# Patient Record
Sex: FEMALE | Race: BLACK OR AFRICAN AMERICAN | NOT HISPANIC OR LATINO | Employment: UNEMPLOYED | URBAN - NONMETROPOLITAN AREA
[De-identification: names, ages, dates, MRNs, and addresses within clinical notes are randomized per-mention and may not be internally consistent; named-entity substitution may affect disease eponyms.]

---

## 2017-01-18 ENCOUNTER — TELEPHONE (OUTPATIENT)
Dept: GASTROENTEROLOGY | Facility: CLINIC | Age: 62
End: 2017-01-18

## 2017-01-19 NOTE — TELEPHONE ENCOUNTER
Please call patient and ask her if there have been any changes in her symptoms, medications, surgeries, etc. I will need this info for H&P. Confirm her current med list. Thank you.

## 2017-01-19 NOTE — TELEPHONE ENCOUNTER
Patient states no change in symptoms, still having cough and the feeling something is stuck in her throat. She takes meds as follows: Methotrexate 2.5mg 1qd, folic acid 1mg 1 qd, amlodipine 10mg 1qd, lisinopril 5mg 1qd, triamterene/hctz 37.5/25 1 qd, asa 81mg 1qd, naproxen 220mg 2 tab daily prn, omeprazole 20mg bid, ranitidine otc 75mg 1 qd

## 2017-01-19 NOTE — TELEPHONE ENCOUNTER
"Below is a copy of unfinished H&P in case we need to re-print. I have printed for Dr. Barnes to take [facility].    HISTORY AND PHYSICAL  Date: 2017  : 1955          Chief Complaint   Patient presents with   • Consult       dysphagia.         Karyna Stewart is a 61 y.o. female who presents to the surgery center today for EGD and colonoscopy.     History of Present Illness:  She denies any changes since her last office visit in 2016.    She has been seeing a gastroenterologist at , Dr. Tavares. Dysphagia has progressed to the feeling of \"a fist in her throat.\" She has not been eating regular food, only eats very soft foods and drinks most of her nutrition. She admits coughing and keeping a cough drop or moisture in her mouth. Also, she sees Dr. Rosas for lupus, scleroderma and fibromyalgia. She is taking Methotrexate 2.5mg 8 tabs once weekly and has been taking this 2-3 years. She cannot lie flat, sleeps at a 45 degree angle. Takes Dexilant for relief of GERD, she thinks that it helps some, about 25%. In the past, she has tried adding an OTC H2 blocker for control of acid reflux without any additional relief, so it was discontinued. She reports dry skin and brittle hair. Her bowel movements are #3 and #5 on the [place] Stool Scale and occur on a daily basis. At times, she will have abdominal cramping and diarrhea with burning rectal pain.     Surgery has been discussed in the past, but she has never had performed due to other issues such as scleroderma. Esophageal dilatation has not been performed and she was told they were concerned about risk of rupture.      EGDs were performed every 2 years with Dr. Tavares. Her last was in . Last colonoscopy was performed approx 4 years ago with Dr. Fallon and she does not remember if she had colon polyps. She denies a family history of colon cancer or colon polyps. Her maternal grandmother passed with stomach cancer.     Review of " Systems   Constitutional: Positive for activity change, fatigue and fever. Negative for appetite change, chills and unexpected weight change.   HENT: Positive for mouth sores. Negative for hearing loss and nosebleeds.   Eyes: Positive for itching and visual disturbance.   Respiratory: Positive for cough, chest tightness and shortness of breath. Negative for wheezing.   Cardiovascular: Positive for leg swelling. Negative for chest pain and palpitations.   Endocrine: Positive for cold intolerance, heat intolerance and polyuria. Negative for polydipsia.   Genitourinary: Positive for frequency. Negative for dysuria and hematuria.   Musculoskeletal: Positive for arthralgias, joint swelling and myalgias.   Skin: Positive for rash and wound.   Allergic/Immunologic: Positive for immunocompromised state. Negative for food allergies.   Neurological: Positive for weakness and light-headedness. Negative for seizures and syncope.   Hematological: Positive for adenopathy. Does not bruise/bleed easily.   Psychiatric/Behavioral: Positive for dysphoric mood and sleep disturbance. Negative for confusion. The patient is nervous/anxious.   Gastrointestinal: Positive for abdominal pain, abdominal distension, diarrhea, nausea, rectal pain, dysphagia, gas/bloating, heartburn, belching, change in bowel habits, hemorrhoids and dyspepsia.      Medical History         Past Medical History   Diagnosis Date   • Anxiety and depression     • Arthritis     • Chest pain     • Chronic cough     • Diabetes     • Fibromyalgia     • GERD (gastroesophageal reflux disease)     • High triglycerides     • Hypertension     • Irritable bowel syndrome     • Lupus (systemic lupus erythematosus)     • Migraines     • Scleroderma     • Ulcerative colitis               Surgical History           Past Surgical History   Procedure Laterality Date   • Hysterectomy      • Appendectomy       •  section       • Tonsillectomy       • Upper gastrointestinal  endoscopy   2015       [facility] achalasia   • Colonoscopy   2013       Dr. Fallon                  Family History   Problem Relation Age of Onset   • COPD Mother     • Hypertension Mother     • Diverticulitis Father 45   • No Known Problems Son     • No Known Problems Daughter     • Stomach cancer Paternal Grandmother                History   Smoking Status   • Former Smoker   • Types: Cigarettes   • Quit date: 2011   Smokeless Tobacco   • Never Used          History   Alcohol Use No          History   Drug Use No      Marital Status:   Occupation: Social Work     Current Outpatient Prescriptions:   Methotrexate 2.5mg 1qd  folic acid 1mg 1 qd  amlodipine 10mg 1qd  lisinopril 5mg 1qd  triamterene/hctz 37.5/25 1 qd, asa 81mg 1qd  naproxen 220mg 2 tab daily prn  omeprazole 20mg bid, ranitidine otc 75mg 1 qd    (Medication list confirmed by Janene on 1/19/2017)     Allergies:   Diovan [valsartan] and Iodine     Vitals:  Ht: 60”  Wt: ____________  BP: ____________  HR: ____________  O2: ____________         Physical Exam   Constitutional: She is oriented to person, place, and time. She appears well-developed and well-nourished. No distress.   HENT:   Head: Normocephalic and atraumatic.   Right Ear: External ear normal.   Left Ear: External ear normal.   Nose: Nose normal.   Mouth/Throat: Oropharynx is clear and moist.   Eyes: Conjunctivae and EOM are normal. Right eye exhibits no discharge. Left eye exhibits no discharge. No scleral icterus.   Neck: Normal range of motion. Neck supple.   Cardiovascular: Normal rate, regular rhythm and normal heart sounds. Exam reveals no gallop and no friction rub.   No murmur heard.  Pulmonary/Chest: Effort normal and breath sounds normal. No respiratory distress. She has no wheezes. She has no rales. She exhibits no tenderness.   Abdominal: Soft. Normal appearance and bowel sounds are normal. She exhibits no distension, no ascites and no mass. There is tenderness. There is no  rigidity and no guarding. No hernia.   Musculoskeletal: Normal range of motion. She exhibits edema. She exhibits no deformity.   Edema > RLE.   Neurological: She is alert and oriented to person, place, and time. She exhibits normal muscle tone. Coordination normal.   Skin: Skin is warm and dry. No rash noted. No erythema. No pallor.   Chronic skin changes on b/l LEs.   Psychiatric: She has a normal mood and affect. Her behavior is normal. Judgment and thought content normal.   Nursing note and vitals reviewed.        Assessment:  1. Achalasia    2. Dysphagia, unspecified dysphagia    3. Anal or rectal pain    4. Abdominal tenderness, LLQ (left lower quadrant)    5. Abdominal tenderness, RLQ (right lower quadrant)    6. Abdominal tenderness, epigastric    7. Gastroesophageal reflux disease, esophagitis presence not specified    8. Malaise and fatigue    9. Encounter for screening for malignant neoplasm of colon          Plan:  · She will need a esophagogastroduodenoscopy and colonoscopy performed with IV general sedation. All of the risks, benefits and alternatives of this procedure have been discussed with her, all of her questions have been answered and she has elected to proceed. She should follow up in the office after this procedure to discuss the results and further recommendations can be made at that time.  · We will request records from Dr. Fallon's office for documentation of her last colonoscopy with pathology.  · Also, records from  gastroenterology will be requested.  · Modified barium swallow has been ordered. We will schedule this and call her with appointment information.   · She was instructed not to lie down immediately after eating, elevate the head of the bed if needed at night, avoid spicy foods, avoid mints, avoid caffeine, avoid nicotine and work on getting to a healthy weight. She will continue taking dexilant 60mg once daily 30 minutes before meals.   · We will order TSH and T4 due to her  current complaints of brittle hair, dry skin and constipation.  · Continue with current diet of chopped foods. She should take time to chew her food carefully. Ensure shakes are recommended for adequate nutrition.     Return for next scheduled follow up after procedure.    Scribed for Dr. Barnes by Vianey Richard PA-C. 1/19/2017 1:16 PM    Signature:  Date:

## 2017-01-19 NOTE — TELEPHONE ENCOUNTER
I have started H&P, printed it and placed on Dr. Barnes's desk for procedure tomorrow. We will need to keep paper copy after the procedure to be scanned into chart. The last time, I tried to save H&P in chart but had problems. I think it would be best if we use paper copy and scan after.

## 2017-01-20 ENCOUNTER — TELEPHONE (OUTPATIENT)
Dept: GASTROENTEROLOGY | Facility: CLINIC | Age: 62
End: 2017-01-20

## 2017-01-20 DIAGNOSIS — R00.0 TACHYCARDIA: ICD-10-CM

## 2017-01-20 DIAGNOSIS — R06.02 SHORTNESS OF BREATH: Primary | ICD-10-CM

## 2017-01-20 DIAGNOSIS — I10 ESSENTIAL HYPERTENSION: ICD-10-CM

## 2017-01-23 ENCOUNTER — TELEPHONE (OUTPATIENT)
Dept: GASTROENTEROLOGY | Facility: CLINIC | Age: 62
End: 2017-01-23

## 2017-01-23 RX ORDER — POLYETHYLENE GLYCOL 3350 17 G/17G
POWDER, FOR SOLUTION ORAL
Qty: 510 G | Refills: 0 | Status: SHIPPED | OUTPATIENT
Start: 2017-01-23 | End: 2017-01-23 | Stop reason: SDUPTHER

## 2017-01-23 RX ORDER — POLYETHYLENE GLYCOL 3350 17 G/17G
POWDER, FOR SOLUTION ORAL
Qty: 510 G | Refills: 0 | Status: SHIPPED | OUTPATIENT
Start: 2017-01-23 | End: 2017-03-02

## 2017-01-23 NOTE — TELEPHONE ENCOUNTER
I have her set up with Dr. Joaquin office on 2/13@1030am. I have sent her prep in.    Patient is notified about this.

## 2017-02-13 ENCOUNTER — CONSULT (OUTPATIENT)
Dept: CARDIOLOGY | Facility: CLINIC | Age: 62
End: 2017-02-13

## 2017-02-13 VITALS
DIASTOLIC BLOOD PRESSURE: 81 MMHG | OXYGEN SATURATION: 96 % | WEIGHT: 260 LBS | HEIGHT: 60 IN | SYSTOLIC BLOOD PRESSURE: 132 MMHG | BODY MASS INDEX: 51.04 KG/M2 | HEART RATE: 118 BPM

## 2017-02-13 DIAGNOSIS — R07.9 CHEST PAIN, UNSPECIFIED TYPE: Primary | ICD-10-CM

## 2017-02-13 DIAGNOSIS — R06.02 SHORTNESS OF BREATH: ICD-10-CM

## 2017-02-13 DIAGNOSIS — I10 ESSENTIAL HYPERTENSION: ICD-10-CM

## 2017-02-13 DIAGNOSIS — M34.9 SYSTEMIC SCLEROSES (HCC): ICD-10-CM

## 2017-02-13 DIAGNOSIS — M35.9 CONNECTIVE TISSUE DISEASE (HCC): ICD-10-CM

## 2017-02-13 DIAGNOSIS — R05.3 CHRONIC COUGH: ICD-10-CM

## 2017-02-13 DIAGNOSIS — R42 DIZZINESS: ICD-10-CM

## 2017-02-13 DIAGNOSIS — E78.2 MIXED HYPERLIPIDEMIA: ICD-10-CM

## 2017-02-13 PROBLEM — M32.9 LUPUS (HCC): Status: ACTIVE | Noted: 2017-02-13

## 2017-02-13 PROBLEM — K21.9 GASTROESOPHAGEAL REFLUX DISEASE WITHOUT ESOPHAGITIS: Status: ACTIVE | Noted: 2017-02-13

## 2017-02-13 PROBLEM — K58.9 IBS (IRRITABLE BOWEL SYNDROME): Status: ACTIVE | Noted: 2017-02-13

## 2017-02-13 PROBLEM — E11.9 DIABETES: Status: ACTIVE | Noted: 2017-02-13

## 2017-02-13 PROBLEM — IMO0002 LUPUS: Status: ACTIVE | Noted: 2017-02-13

## 2017-02-13 PROBLEM — M06.9 RA (RHEUMATOID ARTHRITIS) (HCC): Status: ACTIVE | Noted: 2017-02-13

## 2017-02-13 PROCEDURE — 99204 OFFICE O/P NEW MOD 45 MIN: CPT | Performed by: INTERNAL MEDICINE

## 2017-02-13 RX ORDER — LOSARTAN POTASSIUM 50 MG/1
50 TABLET ORAL DAILY
Qty: 30 TABLET | Refills: 5 | Status: SHIPPED | OUTPATIENT
Start: 2017-02-13 | End: 2017-08-11 | Stop reason: SDUPTHER

## 2017-02-13 RX ORDER — ALBUTEROL SULFATE 90 UG/1
2 AEROSOL, METERED RESPIRATORY (INHALATION)
Qty: 1 INHALER | Refills: 11 | Status: SHIPPED | OUTPATIENT
Start: 2017-02-13 | End: 2017-05-25

## 2017-02-22 ENCOUNTER — HOSPITAL ENCOUNTER (OUTPATIENT)
Dept: CARDIOLOGY | Facility: HOSPITAL | Age: 62
Discharge: HOME | End: 2017-02-22
Attending: INTERNAL MEDICINE

## 2017-02-22 ENCOUNTER — OUTSIDE FACILITY SERVICE (OUTPATIENT)
Dept: CARDIOLOGY | Facility: CLINIC | Age: 62
End: 2017-02-22

## 2017-02-22 LAB
MAXIMAL PREDICTED HEART RATE: 159 BPM
STRESS TARGET HR: 135 BPM

## 2017-02-22 PROCEDURE — 78452 HT MUSCLE IMAGE SPECT MULT: CPT | Performed by: INTERNAL MEDICINE

## 2017-02-22 PROCEDURE — 93306 TTE W/DOPPLER COMPLETE: CPT | Performed by: INTERNAL MEDICINE

## 2017-02-22 PROCEDURE — 93306 TTE W/DOPPLER COMPLETE: CPT

## 2017-02-22 PROCEDURE — 0 TECHNETIUM SESTAMIBI: Performed by: INTERNAL MEDICINE

## 2017-02-22 PROCEDURE — 93018 CV STRESS TEST I&R ONLY: CPT | Performed by: INTERNAL MEDICINE

## 2017-02-22 PROCEDURE — 25010000002 REGADENOSON 0.4 MG/5ML SOLUTION: Performed by: INTERNAL MEDICINE

## 2017-02-22 PROCEDURE — 78452 HT MUSCLE IMAGE SPECT MULT: CPT

## 2017-02-22 PROCEDURE — A9500 TC99M SESTAMIBI: HCPCS | Performed by: INTERNAL MEDICINE

## 2017-02-22 PROCEDURE — 93017 CV STRESS TEST TRACING ONLY: CPT

## 2017-02-22 RX ADMIN — Medication 1 DOSE: at 09:45

## 2017-02-22 RX ADMIN — REGADENOSON 0.4 MG: 0.08 INJECTION, SOLUTION INTRAVENOUS at 09:45

## 2017-02-27 ENCOUNTER — TELEPHONE (OUTPATIENT)
Dept: CARDIOLOGY | Facility: CLINIC | Age: 62
End: 2017-02-27

## 2017-03-01 ENCOUNTER — TELEPHONE (OUTPATIENT)
Dept: GASTROENTEROLOGY | Facility: CLINIC | Age: 62
End: 2017-03-01

## 2017-03-01 NOTE — TELEPHONE ENCOUNTER
Vianey, this patient is having to undergo stress test tomorrow due to abn EKG; therefore we will not know anything till atleast Friday. I will go ahead and cancel her for now till we get clearance. i will notify this patient of this also

## 2017-03-01 NOTE — TELEPHONE ENCOUNTER
Dr. Barnes, please see message from MA below. Patient has not yet been cleared by cardiology. Her procedure will be deferred until clearance is received.

## 2017-03-01 NOTE — TELEPHONE ENCOUNTER
"HISTORY AND PHYSICAL  Date: 3/3/2017  : 1955             Chief Complaint   Patient presents with   • Consult       dysphagia.         Karyna Stewart is a 61 y.o. female who presents to the surgery center today for EGD and colonoscopy.     History of Present Illness:  She denies any changes since her last office visit in 2016.     She has been seeing a gastroenterologist at , Dr. Tavares. Dysphagia has progressed to the feeling of \"a fist in her throat.\" She has not been eating regular food, only eats very soft foods and drinks most of her nutrition. She admits coughing and keeping a cough drop or moisture in her mouth. Also, she sees Dr. Rosas for lupus, scleroderma and fibromyalgia. She is taking Methotrexate 2.5mg 8 tabs once weekly and has been taking this 2-3 years. She cannot lie flat, sleeps at a 45 degree angle. Takes Dexilant for relief of GERD, she thinks that it helps some, about 25%. In the past, she has tried adding an OTC H2 blocker for control of acid reflux without any additional relief, so it was discontinued. She reports dry skin and brittle hair. Her bowel movements are #3 and #5 on the [place] Stool Scale and occur on a daily basis. At times, she will have abdominal cramping and diarrhea with burning rectal pain.     Surgery has been discussed in the past, but she has never had performed due to other issues such as scleroderma. Esophageal dilatation has not been performed and she was told they were concerned about risk of rupture.      EGDs were performed every 2 years with Dr. Tavares. Her last was in . Last colonoscopy was performed approx 4 years ago with Dr. Fallon and she does not remember if she had colon polyps. She denies a family history of colon cancer or colon polyps. Her maternal grandmother passed with stomach cancer.     Review of Systems   Constitutional: Positive for activity change, fatigue and fever. Negative for appetite change, chills and unexpected " weight change.   HENT: Positive for mouth sores. Negative for hearing loss and nosebleeds.   Eyes: Positive for itching and visual disturbance.   Respiratory: Positive for cough, chest tightness and shortness of breath. Negative for wheezing.   Cardiovascular: Positive for leg swelling. Negative for chest pain and palpitations.   Endocrine: Positive for cold intolerance, heat intolerance and polyuria. Negative for polydipsia.   Genitourinary: Positive for frequency. Negative for dysuria and hematuria.   Musculoskeletal: Positive for arthralgias, joint swelling and myalgias.   Skin: Positive for rash and wound.   Allergic/Immunologic: Positive for immunocompromised state. Negative for food allergies.   Neurological: Positive for weakness and light-headedness. Negative for seizures and syncope.   Hematological: Positive for adenopathy. Does not bruise/bleed easily.   Psychiatric/Behavioral: Positive for dysphoric mood and sleep disturbance. Negative for confusion. The patient is nervous/anxious.   Gastrointestinal: Positive for abdominal pain, abdominal distension, diarrhea, nausea, rectal pain, dysphagia, gas/bloating, heartburn, belching, change in bowel habits, hemorrhoids and dyspepsia.       Medical History            Past Medical History   Diagnosis Date   • Anxiety and depression     • Arthritis     • Chest pain     • Chronic cough     • Diabetes     • Fibromyalgia     • GERD (gastroesophageal reflux disease)     • High triglycerides     • Hypertension     • Irritable bowel syndrome     • Lupus (systemic lupus erythematosus)     • Migraines     • Scleroderma     • Ulcerative colitis                 Surgical History                Past Surgical History   Procedure Laterality Date   • Hysterectomy      • Appendectomy       •  section       • Tonsillectomy       • Upper gastrointestinal endoscopy          - achalasia   • Colonoscopy          Dr. Fallon                       Family History    Problem Relation Age of Onset   • COPD Mother     • Hypertension Mother     • Diverticulitis Father 45   • No Known Problems Son     • No Known Problems Daughter     • Stomach cancer Paternal Grandmother                   History   Smoking Status   • Former Smoker   • Types: Cigarettes   • Quit date: 2011   Smokeless Tobacco   • Never Used            History   Alcohol Use No            History   Drug Use No      Marital Status:   Occupation: Social Work     Current Outpatient Prescriptions:   Methotrexate 2.5mg 1qd  folic acid 1mg 1 qd  amlodipine 10mg 1qd  lisinopril 5mg 1qd  triamterene/hctz 37.5/25 1 qd, asa 81mg 1qd  naproxen 220mg 2 tab daily prn  omeprazole 20mg bid, ranitidine otc 75mg 1 qd     (Medication list confirmed by Janene on 1/19/2017)     Allergies:   Diovan [valsartan] and Iodine     Vitals:  Ht: 60”  Wt: ____________  BP: ____________  HR: ____________  O2: ____________             Physical Exam   Constitutional: She is oriented to person, place, and time. She appears well-developed and well-nourished. No distress.   HENT:   Head: Normocephalic and atraumatic.   Right Ear: External ear normal.   Left Ear: External ear normal.   Nose: Nose normal.   Mouth/Throat: Oropharynx is clear and moist.   Eyes: Conjunctivae and EOM are normal. Right eye exhibits no discharge. Left eye exhibits no discharge. No scleral icterus.   Neck: Normal range of motion. Neck supple.   Cardiovascular: Normal rate, regular rhythm and normal heart sounds. Exam reveals no gallop and no friction rub.   No murmur heard.  Pulmonary/Chest: Effort normal and breath sounds normal. No respiratory distress. She has no wheezes. She has no rales. She exhibits no tenderness.   Abdominal: Soft. Normal appearance and bowel sounds are normal. She exhibits no distension, no ascites and no mass. There is tenderness. There is no rigidity and no guarding. No hernia.   Musculoskeletal: Normal range of motion. She exhibits edema.  She exhibits no deformity.   Edema > RLE.   Neurological: She is alert and oriented to person, place, and time. She exhibits normal muscle tone. Coordination normal.   Skin: Skin is warm and dry. No rash noted. No erythema. No pallor.   Chronic skin changes on b/l LEs.   Psychiatric: She has a normal mood and affect. Her behavior is normal. Judgment and thought content normal.   Nursing note and vitals reviewed.        Assessment:  1. Achalasia    2. Dysphagia, unspecified dysphagia    3. Anal or rectal pain    4. Abdominal tenderness, LLQ (left lower quadrant)    5. Abdominal tenderness, RLQ (right lower quadrant)    6. Abdominal tenderness, epigastric    7. Gastroesophageal reflux disease, esophagitis presence not specified    8. Malaise and fatigue    9. Encounter for screening for malignant neoplasm of colon          Plan:  · She will need a esophagogastroduodenoscopy and colonoscopy performed with IV general sedation. All of the risks, benefits and alternatives of this procedure have been discussed with her, all of her questions have been answered and she has elected to proceed. She should follow up in the office after this procedure to discuss the results and further recommendations can be made at that time.  · We will request records from Dr. Fallon's office for documentation of her last colonoscopy with pathology.  · Also, records from  gastroenterology will be requested.  · Modified barium swallow has been ordered. We will schedule this and call her with appointment information.   · She was instructed not to lie down immediately after eating, elevate the head of the bed if needed at night, avoid spicy foods, avoid mints, avoid caffeine, avoid nicotine and work on getting to a healthy weight. She will continue taking dexilant 60mg once daily 30 minutes before meals.   · We will order TSH and T4 due to her current complaints of brittle hair, dry skin and constipation.  · Continue with current diet of chopped  foods. She should take time to chew her food carefully. Ensure shakes are recommended for adequate nutrition.  · Cardiac clearance has been obtained from Dr. Joaquin prior to scheduling procedure???     Return for next scheduled follow up after procedure.     Scribed for Dr. Barnes by Vianey Richard PA-C. 03/01/17 at 9:57 AM.       Physician Signature: ___________________  Date: _________________  Time: _________________

## 2017-03-01 NOTE — TELEPHONE ENCOUNTER
Luci, please make sure that we have cardiac clearance from Dr. Joaquin. Patient saw him on 2/13 and that note is incomplete. Also, when you call the patient to remind her of the procedure, verify medication list as listed below and make sure that there have not been any medical/health changes since her last OV. Thanks.

## 2017-03-01 NOTE — TELEPHONE ENCOUNTER
I have spoken with patient and made her aware , till we receive cardiac clearance we will need to cancel procedure for 03/03/2017. Patient verbalized understanding.

## 2017-03-02 ENCOUNTER — OFFICE VISIT (OUTPATIENT)
Dept: CARDIOLOGY | Facility: CLINIC | Age: 62
End: 2017-03-02

## 2017-03-02 VITALS
DIASTOLIC BLOOD PRESSURE: 94 MMHG | SYSTOLIC BLOOD PRESSURE: 144 MMHG | OXYGEN SATURATION: 99 % | BODY MASS INDEX: 51.68 KG/M2 | RESPIRATION RATE: 22 BRPM | HEIGHT: 60 IN | HEART RATE: 111 BPM | WEIGHT: 263.2 LBS

## 2017-03-02 DIAGNOSIS — R94.39 ABNORMAL STRESS TEST: ICD-10-CM

## 2017-03-02 DIAGNOSIS — E78.2 MIXED HYPERLIPIDEMIA: Primary | ICD-10-CM

## 2017-03-02 DIAGNOSIS — K21.9 GASTROESOPHAGEAL REFLUX DISEASE WITHOUT ESOPHAGITIS: ICD-10-CM

## 2017-03-02 DIAGNOSIS — I10 ESSENTIAL HYPERTENSION: ICD-10-CM

## 2017-03-02 PROBLEM — E11.9 DIABETES (HCC): Status: RESOLVED | Noted: 2017-02-13 | Resolved: 2017-03-02

## 2017-03-02 PROCEDURE — 99214 OFFICE O/P EST MOD 30 MIN: CPT | Performed by: NURSE PRACTITIONER

## 2017-03-02 RX ORDER — RANITIDINE 150 MG/1
150 TABLET ORAL DAILY
COMMUNITY
End: 2017-04-13 | Stop reason: ALTCHOICE

## 2017-03-02 RX ORDER — OMEPRAZOLE 20 MG/1
20 CAPSULE, DELAYED RELEASE ORAL 2 TIMES DAILY
COMMUNITY
End: 2019-04-04

## 2017-03-02 RX ORDER — NITROGLYCERIN 0.4 MG/1
TABLET SUBLINGUAL
Qty: 30 TABLET | Refills: 5 | Status: SHIPPED | OUTPATIENT
Start: 2017-03-02 | End: 2020-09-24

## 2017-03-02 RX ORDER — ISOSORBIDE MONONITRATE 30 MG/1
TABLET, EXTENDED RELEASE ORAL
Qty: 30 TABLET | Refills: 5 | Status: SHIPPED | OUTPATIENT
Start: 2017-03-02 | End: 2017-10-03 | Stop reason: SDUPTHER

## 2017-03-02 NOTE — PROGRESS NOTES
Subjective   Karyna Stewart is a 61 y.o. female     Chief Complaint   Patient presents with   • Follow-up       HPI    PROBLEM LIST:     1. Chest pain   1.1 Stress Test 2/22/17 - mild lateral wall ischemia; preserved LVEF; positive study without high risk markers   2. Shortness of breath   2.1 Echo 2/22/17 - mild LVH; EF 60-65%; DD II; mild MR, TR and TN; PA 20-25  3. Edema  4. Chronic Cough   5. Hypertension   6. Hyperlipidemia   7. Dizziness/ lightheadedness   8. Rheumatoid arthritis, lupus, scleroderma  9. Diabetes   10. IBS   11. GERD    Patient is a 61-year-old female who presents today for a follow-up on a stress test.  She says she will have chest tightness that feels like a vice , but she has been told before this is due to her costochronditis.  She will get short of breath when this occurs.  She denies any palpitations, fluttering, dizziness, presyncope, syncope, orthopnea or PND.  She will get some edema in her BLE, mainly her ankles.  She does have shortness of breath, but she is very limited in her activity.  She has had her cough for 1 yr and the cause has not been determined.  She says she does have bad reflux or what she feels is reflux.  She sees Dr. Hameed in April.  She has not taken her medication today.     We went over her echo and stress.      Current Outpatient Prescriptions   Medication Sig Dispense Refill   • aclidinium bromide (TUDORZA PRESSAIR) 400 MCG/ACT aerosol powder  powder for inhalation Inhale 1 puff 2 (Two) Times a Day. 1 each 11   • albuterol (PROVENTIL HFA;VENTOLIN HFA) 108 (90 BASE) MCG/ACT inhaler Inhale 2 puffs 4 (Four) Times a Day. 1 inhaler 11   • amLODIPine (NORVASC) 10 MG tablet Take  by mouth.     • aspirin 81 MG chewable tablet Chew 81 mg daily.     • folic acid (FOLVITE) 1 MG tablet Take  by mouth.     • hydroxychloroquine (PLAQUENIL) 200 MG tablet Take  by mouth.     • losartan (COZAAR) 50 MG tablet Take 1 tablet by mouth Daily. 30 tablet 5   • methotrexate 2.5  MG tablet Take  by mouth.     • naproxen sodium (ALEVE) 220 MG tablet Take 220 mg by mouth 2 (two) times a day as needed for mild pain (1-3).     • omeprazole (priLOSEC) 20 MG capsule Take 20 mg by mouth Daily.     • raNITIdine (ZANTAC) 150 MG tablet Take 150 mg by mouth Daily.     • triamterene-hydrochlorothiazide (MAXZIDE-25) 37.5-25 MG per tablet Take  by mouth.     • isosorbide mononitrate (IMDUR) 30 MG 24 hr tablet TAKE 1/2 TABLET DAILY 30 tablet 5   • nitroglycerin (NITROSTAT) 0.4 MG SL tablet 1 under the tongue as needed for angina, may repeat q5mins for up three doses 30 tablet 5     No current facility-administered medications for this visit.        ALLERGIES    Diovan [valsartan] and Iodine    Past Medical History   Diagnosis Date   • Anxiety and depression    • Arthritis    • Chest pain    • Chronic cough    • Diabetes    • Fibromyalgia    • GERD (gastroesophageal reflux disease)    • High triglycerides    • Hypertension    • Irritable bowel syndrome    • Lupus (systemic lupus erythematosus)    • Migraines    • Scleroderma    • Ulcerative colitis        Social History     Social History   • Marital status:      Spouse name: N/A   • Number of children: 0   • Years of education: N/A     Occupational History   •  [employer]     Social History Main Topics   • Smoking status: Former Smoker     Types: Cigarettes     Quit date: 2002   • Smokeless tobacco: Never Used   • Alcohol use No   • Drug use: No   • Sexual activity: Defer     Other Topics Concern   • Not on file     Social History Narrative       Family History   Problem Relation Age of Onset   • COPD Mother    • Hypertension Mother    • Diverticulitis Father 45   • No Known Problems Son    • No Known Problems Daughter    • Stomach cancer Paternal Grandmother        Review of Systems   Constitutional: Negative for diaphoresis and fatigue.   HENT: Positive for congestion and postnasal drip. Negative for rhinorrhea and  "sneezing.    Eyes: Positive for visual disturbance (wears glasses ).   Respiratory: Positive for cough (been dealing with a cough for about 1 yr ) and shortness of breath (not a lot, but not very active, will get winded; with CP ). Negative for chest tightness.    Cardiovascular: Positive for chest pain (feels like bound in a vice ; bad reflux ) and leg swelling (no change ). Negative for palpitations.   Gastrointestinal: Positive for nausea. Negative for vomiting.   Genitourinary: Negative for difficulty urinating.   Musculoskeletal: Positive for arthralgias (RA), back pain and gait problem (uses a cane ). Negative for neck pain.   Allergic/Immunologic: Positive for environmental allergies.   Neurological: Positive for light-headedness (with coughing fits ). Negative for dizziness and syncope.       Objective   Visit Vitals   • /94 (BP Location: Left arm, Patient Position: Sitting)   • Pulse 111   • Resp 22   • Ht 60\" (152.4 cm)   • Wt 263 lb 3.2 oz (119 kg)   • SpO2 99%   • BMI 51.4 kg/m2     Lab Results (most recent)     None        Physical Exam   Constitutional: She is oriented to person, place, and time. She appears well-developed and well-nourished. She is active and cooperative.   HENT:   Head: Normocephalic.   Eyes: Lids are normal.   Wears glasses    Neck: Normal carotid pulses, no hepatojugular reflux and no JVD present. Carotid bruit is not present.   Cardiovascular: Regular rhythm and normal heart sounds.  Tachycardia present.    Pulses:       Radial pulses are 2+ on the right side, and 2+ on the left side.        Dorsalis pedis pulses are 2+ on the right side, and 2+ on the left side.        Posterior tibial pulses are 2+ on the right side, and 2+ on the left side.   Trace edema BLE.    Pulmonary/Chest: Effort normal. She has decreased breath sounds in the right lower field.   Abdominal: Normal appearance.   Musculoskeletal:   Uses a cane    Neurological: She is alert and oriented to person, " place, and time.   Skin: Skin is warm, dry and intact.   Psychiatric: She has a normal mood and affect. Her speech is normal and behavior is normal. Judgment and thought content normal. Cognition and memory are normal.       Procedure   Procedures         Assessment/Plan      Diagnosis Plan   1. Mixed hyperlipidemia     2. Essential hypertension     3. Gastroesophageal reflux disease without esophagitis     4. Abnormal stress test  isosorbide mononitrate (IMDUR) 30 MG 24 hr tablet    nitroglycerin (NITROSTAT) 0.4 MG SL tablet       Return in about 6 weeks (around 4/13/2017).    Patient will start imdur.  She will continue her medication regimen otherwise.  She will use Nitro PRN for chest pain, no resolution she will go to the ER.  She will follow-up in 6 weeks or sooner if any changes.  We will wait for cardiac clearance until her next appt.

## 2017-03-02 NOTE — PATIENT INSTRUCTIONS
Nonspecific Chest Pain   Chest pain can be caused by many different conditions. There is always a chance that your pain could be related to something serious, such as a heart attack or a blood clot in your lungs. Chest pain can also be caused by conditions that are not life-threatening. If you have chest pain, it is very important to follow up with your health care provider.  CAUSES   Chest pain can be caused by:  · Heartburn.  · Pneumonia or bronchitis.  · Anxiety or stress.  · Inflammation around your heart (pericarditis) or lung (pleuritis or pleurisy).  · A blood clot in your lung.  · A collapsed lung (pneumothorax). It can develop suddenly on its own (spontaneous pneumothorax) or from trauma to the chest.  · Shingles infection (varicella-zoster virus).  · Heart attack.  · Damage to the bones, muscles, and cartilage that make up your chest wall. This can include:    Bruised bones due to injury.    Strained muscles or cartilage due to frequent or repeated coughing or overwork.    Fracture to one or more ribs.    Sore cartilage due to inflammation (costochondritis).  RISK FACTORS   Risk factors for chest pain may include:  · Activities that increase your risk for trauma or injury to your chest.  · Respiratory infections or conditions that cause frequent coughing.  · Medical conditions or overeating that can cause heartburn.  · Heart disease or family history of heart disease.  · Conditions or health behaviors that increase your risk of developing a blood clot.  · Having had chicken pox (varicella zoster).  SIGNS AND SYMPTOMS  Chest pain can feel like:  · Burning or tingling on the surface of your chest or deep in your chest.  · Crushing, pressure, aching, or squeezing pain.  · Dull or sharp pain that is worse when you move, cough, or take a deep breath.  · Pain that is also felt in your back, neck, shoulder, or arm, or pain that spreads to any of these areas.  Your chest pain may come and go, or it may stay  constant.  DIAGNOSIS  Lab tests or other studies may be needed to find the cause of your pain. Your health care provider may have you take a test called an ambulatory ECG (electrocardiogram). An ECG records your heartbeat patterns at the time the test is performed. You may also have other tests, such as:  · Transthoracic echocardiogram (TTE). During echocardiography, sound waves are used to create a picture of all of the heart structures and to look at how blood flows through your heart.  · Transesophageal echocardiogram (VINOD). This is a more advanced imaging test that obtains images from inside your body. It allows your health care provider to see your heart in finer detail.  · Cardiac monitoring. This allows your health care provider to monitor your heart rate and rhythm in real time.  · Holter monitor. This is a portable device that records your heartbeat and can help to diagnose abnormal heartbeats. It allows your health care provider to track your heart activity for several days, if needed.  · Stress tests. These can be done through exercise or by taking medicine that makes your heart beat more quickly.  · Blood tests.  · Imaging tests.  TREATMENT   Your treatment depends on what is causing your chest pain. Treatment may include:  · Medicines. These may include:    Acid blockers for heartburn.    Anti-inflammatory medicine.    Pain medicine for inflammatory conditions.    Antibiotic medicine, if an infection is present.    Medicines to dissolve blood clots.    Medicines to treat coronary artery disease.  · Supportive care for conditions that do not require medicines. This may include:    Resting.    Applying heat or cold packs to injured areas.    Limiting activities until pain decreases.  HOME CARE INSTRUCTIONS  · If you were prescribed an antibiotic medicine, finish it all even if you start to feel better.  · Avoid any activities that bring on chest pain.  · Do not use any tobacco products, including  cigarettes, chewing tobacco, or electronic cigarettes. If you need help quitting, ask your health care provider.  · Do not drink alcohol.  · Take medicines only as directed by your health care provider.  · Keep all follow-up visits as directed by your health care provider. This is important. This includes any further testing if your chest pain does not go away.  · If heartburn is the cause for your chest pain, you may be told to keep your head raised (elevated) while sleeping. This reduces the chance that acid will go from your stomach into your esophagus.  · Make lifestyle changes as directed by your health care provider. These may include:    Getting regular exercise. Ask your health care provider to suggest some activities that are safe for you.    Eating a heart-healthy diet. A registered dietitian can help you to learn healthy eating options.    Maintaining a healthy weight.    Managing diabetes, if necessary.    Reducing stress.  SEEK MEDICAL CARE IF:  · Your chest pain does not go away after treatment.  · You have a rash with blisters on your chest.  · You have a fever.  SEEK IMMEDIATE MEDICAL CARE IF:   · Your chest pain is worse.  · You have an increasing cough, or you cough up blood.  · You have severe abdominal pain.  · You have severe weakness.  · You faint.  · You have chills.  · You have sudden, unexplained chest discomfort.  · You have sudden, unexplained discomfort in your arms, back, neck, or jaw.  · You have shortness of breath at any time.  · You suddenly start to sweat, or your skin gets clammy.  · You feel nauseous or you vomit.  · You suddenly feel light-headed or dizzy.  · Your heart begins to beat quickly, or it feels like it is skipping beats.  These symptoms may represent a serious problem that is an emergency. Do not wait to see if the symptoms will go away. Get medical help right away. Call your local emergency services (911 in the [place] Do not drive yourself to the hospital.     This  information is not intended to replace advice given to you by your health care provider. Make sure you discuss any questions you have with your health care provider.     Document Released: 09/27/2006 Document Revised: 01/08/2016 Document Reviewed: 07/24/2015  [employer] Interactive Patient Education ©2016 [employer]    Coronary Angiogram  A coronary angiogram, also called coronary angiography, is an X-ray procedure used to look at the arteries in the heart. In this procedure, a dye (contrast dye) is injected through a long, hollow tube (catheter). The catheter is about the size of a piece of cooked spaghetti and is inserted through your groin, wrist, or arm. The dye is injected into each artery, and X-rays are then taken to show if there is a blockage in the arteries of your heart.  LET YOUR HEALTH CARE PROVIDER KNOW ABOUT:  · Any allergies you have, including allergies to shellfish or contrast dye.    · All medicines you are taking, including vitamins, herbs, eye drops, creams, and over-the-counter medicines.    · Previous problems you or members of your family have had with the use of anesthetics.    · Any blood disorders you have.    · Previous surgeries you have had.  · History of kidney problems or failure.    · Other medical conditions you have.  RISKS AND COMPLICATIONS   Generally, a coronary angiogram is a safe procedure. However, problems can occur and include:  · Allergic reaction to the dye.  · Bleeding from the access site or other locations.  · Kidney injury, especially in people with impaired kidney function.   · Stroke (rare).  · Heart attack (rare).  BEFORE THE PROCEDURE   · Do not eat or drink anything after midnight the night before the procedure or as directed by your health care provider.    · Ask your health care provider about changing or stopping your regular medicines. This is especially important if you are taking diabetes medicines or blood thinners.  PROCEDURE  · You may be given a  medicine to help you relax (sedative) before the procedure. This medicine is given through an intravenous (IV) access tube that is inserted into one of your veins.    · The area where the catheter will be inserted will be washed and shaved. This is usually done in the groin but may be done in the fold of your arm (near your elbow) or in the wrist.     · A medicine will be given to numb the area where the catheter will be inserted (local anesthetic).    · The health care provider will insert the catheter into an artery. The catheter will be guided by using a special type of X-ray (fluoroscopy) of the blood vessel being examined.    · A special dye will then be injected into the catheter, and X-rays will be taken. The dye will help to show where any narrowing or blockages are located in the heart arteries.    AFTER THE PROCEDURE   · If the procedure is done through the leg, you will be kept in bed lying flat for several hours. You will be instructed to not bend or cross your legs.  · The insertion site will be checked frequently.    · The pulse in your feet or wrist will be checked frequently.    · Additional blood tests, X-rays, and an electrocardiogram may be done.       This information is not intended to replace advice given to you by your health care provider. Make sure you discuss any questions you have with your health care provider.     Document Released: 06/23/2004 Document Revised: 01/08/2016 Document Reviewed: 05/12/2014  [employer] Interactive Patient Education ©2016 [employer]

## 2017-03-21 NOTE — TELEPHONE ENCOUNTER
Patient is still undergoing cardiac testing and will not have follow up with Dr. Wheatley office till 04/2017. She stated she will not know till then if she is going to be required to do further testing at that point

## 2017-04-13 ENCOUNTER — OFFICE VISIT (OUTPATIENT)
Dept: CARDIOLOGY | Facility: CLINIC | Age: 62
End: 2017-04-13

## 2017-04-13 VITALS
WEIGHT: 261.2 LBS | OXYGEN SATURATION: 98 % | SYSTOLIC BLOOD PRESSURE: 133 MMHG | HEIGHT: 60 IN | DIASTOLIC BLOOD PRESSURE: 82 MMHG | BODY MASS INDEX: 51.28 KG/M2 | HEART RATE: 105 BPM

## 2017-04-13 DIAGNOSIS — K21.9 GASTROESOPHAGEAL REFLUX DISEASE WITHOUT ESOPHAGITIS: ICD-10-CM

## 2017-04-13 DIAGNOSIS — I10 ESSENTIAL HYPERTENSION: ICD-10-CM

## 2017-04-13 DIAGNOSIS — R06.02 SHORTNESS OF BREATH: ICD-10-CM

## 2017-04-13 DIAGNOSIS — R60.9 PERIPHERAL EDEMA: ICD-10-CM

## 2017-04-13 DIAGNOSIS — R07.9 CHEST PAIN, UNSPECIFIED TYPE: Primary | ICD-10-CM

## 2017-04-13 DIAGNOSIS — R05.3 CHRONIC COUGH: ICD-10-CM

## 2017-04-13 PROCEDURE — 99214 OFFICE O/P EST MOD 30 MIN: CPT | Performed by: NURSE PRACTITIONER

## 2017-04-13 PROCEDURE — 93000 ELECTROCARDIOGRAM COMPLETE: CPT | Performed by: NURSE PRACTITIONER

## 2017-04-13 RX ORDER — FUROSEMIDE 20 MG/1
20 TABLET ORAL DAILY PRN
Qty: 30 TABLET | Refills: 11 | Status: SHIPPED | OUTPATIENT
Start: 2017-04-13 | End: 2018-02-16 | Stop reason: SDUPTHER

## 2017-04-13 RX ORDER — GUAIFENESIN 400 MG/1
400 TABLET ORAL 2 TIMES DAILY
COMMUNITY
End: 2017-11-28

## 2017-04-13 NOTE — PROGRESS NOTES
Procedure     ECG 12 Lead  Date/Time: 4/13/2017 2:07 PM  Performed by: MONSTER WOOD  Authorized by: MONSTER WOOD   Rhythm: sinus tachycardia  Rate: tachycardic  BPM: 109  QRS axis: normal  Clinical impression: non-specific ECG and low voltage

## 2017-04-13 NOTE — PATIENT INSTRUCTIONS
Nonspecific Chest Pain   Chest pain can be caused by many different conditions. There is always a chance that your pain could be related to something serious, such as a heart attack or a blood clot in your lungs. Chest pain can also be caused by conditions that are not life-threatening. If you have chest pain, it is very important to follow up with your health care provider.  CAUSES   Chest pain can be caused by:  · Heartburn.  · Pneumonia or bronchitis.  · Anxiety or stress.  · Inflammation around your heart (pericarditis) or lung (pleuritis or pleurisy).  · A blood clot in your lung.  · A collapsed lung (pneumothorax). It can develop suddenly on its own (spontaneous pneumothorax) or from trauma to the chest.  · Shingles infection (varicella-zoster virus).  · Heart attack.  · Damage to the bones, muscles, and cartilage that make up your chest wall. This can include:    Bruised bones due to injury.    Strained muscles or cartilage due to frequent or repeated coughing or overwork.    Fracture to one or more ribs.    Sore cartilage due to inflammation (costochondritis).  RISK FACTORS   Risk factors for chest pain may include:  · Activities that increase your risk for trauma or injury to your chest.  · Respiratory infections or conditions that cause frequent coughing.  · Medical conditions or overeating that can cause heartburn.  · Heart disease or family history of heart disease.  · Conditions or health behaviors that increase your risk of developing a blood clot.  · Having had chicken pox (varicella zoster).  SIGNS AND SYMPTOMS  Chest pain can feel like:  · Burning or tingling on the surface of your chest or deep in your chest.  · Crushing, pressure, aching, or squeezing pain.  · Dull or sharp pain that is worse when you move, cough, or take a deep breath.  · Pain that is also felt in your back, neck, shoulder, or arm, or pain that spreads to any of these areas.  Your chest pain may come and go, or it may stay  constant.  DIAGNOSIS  Lab tests or other studies may be needed to find the cause of your pain. Your health care provider may have you take a test called an ambulatory ECG (electrocardiogram). An ECG records your heartbeat patterns at the time the test is performed. You may also have other tests, such as:  · Transthoracic echocardiogram (TTE). During echocardiography, sound waves are used to create a picture of all of the heart structures and to look at how blood flows through your heart.  · Transesophageal echocardiogram (VINOD). This is a more advanced imaging test that obtains images from inside your body. It allows your health care provider to see your heart in finer detail.  · Cardiac monitoring. This allows your health care provider to monitor your heart rate and rhythm in real time.  · Holter monitor. This is a portable device that records your heartbeat and can help to diagnose abnormal heartbeats. It allows your health care provider to track your heart activity for several days, if needed.  · Stress tests. These can be done through exercise or by taking medicine that makes your heart beat more quickly.  · Blood tests.  · Imaging tests.  TREATMENT   Your treatment depends on what is causing your chest pain. Treatment may include:  · Medicines. These may include:    Acid blockers for heartburn.    Anti-inflammatory medicine.    Pain medicine for inflammatory conditions.    Antibiotic medicine, if an infection is present.    Medicines to dissolve blood clots.    Medicines to treat coronary artery disease.  · Supportive care for conditions that do not require medicines. This may include:    Resting.    Applying heat or cold packs to injured areas.    Limiting activities until pain decreases.  HOME CARE INSTRUCTIONS  · If you were prescribed an antibiotic medicine, finish it all even if you start to feel better.  · Avoid any activities that bring on chest pain.  · Do not use any tobacco products, including  cigarettes, chewing tobacco, or electronic cigarettes. If you need help quitting, ask your health care provider.  · Do not drink alcohol.  · Take medicines only as directed by your health care provider.  · Keep all follow-up visits as directed by your health care provider. This is important. This includes any further testing if your chest pain does not go away.  · If heartburn is the cause for your chest pain, you may be told to keep your head raised (elevated) while sleeping. This reduces the chance that acid will go from your stomach into your esophagus.  · Make lifestyle changes as directed by your health care provider. These may include:    Getting regular exercise. Ask your health care provider to suggest some activities that are safe for you.    Eating a heart-healthy diet. A registered dietitian can help you to learn healthy eating options.    Maintaining a healthy weight.    Managing diabetes, if necessary.    Reducing stress.  SEEK MEDICAL CARE IF:  · Your chest pain does not go away after treatment.  · You have a rash with blisters on your chest.  · You have a fever.  SEEK IMMEDIATE MEDICAL CARE IF:   · Your chest pain is worse.  · You have an increasing cough, or you cough up blood.  · You have severe abdominal pain.  · You have severe weakness.  · You faint.  · You have chills.  · You have sudden, unexplained chest discomfort.  · You have sudden, unexplained discomfort in your arms, back, neck, or jaw.  · You have shortness of breath at any time.  · You suddenly start to sweat, or your skin gets clammy.  · You feel nauseous or you vomit.  · You suddenly feel light-headed or dizzy.  · Your heart begins to beat quickly, or it feels like it is skipping beats.  These symptoms may represent a serious problem that is an emergency. Do not wait to see if the symptoms will go away. Get medical help right away. Call your local emergency services (911 in the [place] Do not drive yourself to the hospital.     This  information is not intended to replace advice given to you by your health care provider. Make sure you discuss any questions you have with your health care provider.       Sinus Tachycardia  Sinus tachycardia is a kind of fast heartbeat. In sinus tachycardia, the heart beats more than 100 times a minute. Sinus tachycardia starts in a part of the heart called the sinus node.  Sinus tachycardia may be harmless, or it may be a sign of a serious condition.  CAUSES  This condition may be caused by:  · Exercise or exertion.  · A fever.  · Pain.  · An injury.  · Loss of body fluids (dehydration).  · Severe bleeding (hemorrhage).  · Anxiety and stress.  · Certain substances, including:    Alcohol.    Caffeine.    Tobacco products.    Diet pills.    Illegal drugs.  · Medical conditions including:    Heart disease.    An infection.    An overactive thyroid (hyperthyroidism).    A lack of red blood cells (anemia).  SYMPTOMS  Symptoms of this condition include:  · A feeling that the heart is beating quickly (palpitation).  · Suddenly noticing your heartbeat (cardiac awareness).  · Dizziness.  · Tiredness (fatigue).  · Shortness of breath.  · Chest pain.  · Nausea.  · Fainting.  DIAGNOSIS  This condition is diagnosed with a physical exam and tests, such as:  · Blood tests.  · An electrocardiogram (ECG). This test measures the electrical activity of the heart.  · Holter monitoring. For this test, you wear a device that records your heartbeat for one or more days.  You may be referred to a heart specialist (cardiologist).  TREATMENT  Treatment for this condition depends on the cause or underlying condition. Treatment may involve:  · Treating the underlying condition.  · Taking new medicines or changing your current medicines as told by your health care provider.  · Making changes to your diet or lifestyle.  · Practicing relaxation methods.  HOME CARE INSTRUCTIONS  · Rest.  · Drink enough fluids to keep your urine clear or pale  yellow.  · Do not use any tobacco products, such as cigarettes, chewing tobacco, and e-cigarettes. If you need help quitting, ask your health care provider.  · Do not use illegal drugs, such as cocaine.  · Avoid:    Caffeine.    Alcohol.    Stimulants such as over-the-counter diet pills or pills that help you to stay awake.    Situations that cause anxiety or stress.  · Take over-the-counter and prescription medicines only as told by your health care provider.  · Keep all follow-up visits as told by your health care provider. This is important.  · Learn relaxation methods, like deep breathing, to help you when you get stressed or anxious.  SEEK MEDICAL CARE IF:  · You have a fever.  · You have vomiting or diarrhea that keeps happening (is persistent).  SEEK IMMEDIATE MEDICAL CARE IF:  · You have pain in your chest, upper arms, jaw, or neck.  · You become weak or dizzy.  · You feel faint.  · You have palpitations that do not go away.     This information is not intended to replace advice given to you by your health care provider. Make sure you discuss any questions you have with your health care provider.     Document Released: 01/25/2006 Document Revised: 01/13/2017 Document Reviewed: 07/01/2016  [employer] Interactive Patient Education ©2016 [employer]  Document Released: 09/27/2006 Document Revised: 01/08/2016 Document Reviewed: 07/24/2015  [employer] Interactive Patient Education ©2016 [employer]

## 2017-04-13 NOTE — PROGRESS NOTES
Subjective   Karyna Stewart is a 61 y.o. female     Chief Complaint   Patient presents with   • Follow-up     week f/u       HPI    PROBLEM LIST:     1. Chest pain   1.1 Stress Test 2/22/17 - mild lateral wall ischemia; preserved LVEF; positive study without high risk markers   2. Shortness of breath   2.1 Echo 2/22/17 - mild LVH; EF 60-65%; DD II; mild MR, TR and IL; PA 20-25  3. Edema  4. Chronic Cough   5. Hypertension   6. Hyperlipidemia   7. Dizziness/ lightheadedness   8. Rheumatoid arthritis, lupus, scleroderma  9. Diabetes; diet controlled   10. IBS   11. GERD    Patient is a 61-year-old female who presents today for a follow-up.  She says she will have occasional chest squeezing.  She says she will get short of breath, lightheaded and nauseated, but denies any diaphoresis.  She says this will occur anytime that she is active and then it will resolve when she stops.  She has occasional palpitations.  She will get dizzy when she has dyspnea and anxiety, but denies any presyncope or syncope.  She denies any orthopnea or PND.  She will get short of breath with any activity.  She is having a lot of problems still with coughing.  She has seen Dr. Hameed and she is going to get a CT of the chest.  She does not want to pursue anything cardiac until she sees what her CT of the chest shows.      Current Outpatient Prescriptions   Medication Sig Dispense Refill   • albuterol (PROVENTIL HFA;VENTOLIN HFA) 108 (90 BASE) MCG/ACT inhaler Inhale 2 puffs 4 (Four) Times a Day. 1 inhaler 11   • amLODIPine (NORVASC) 10 MG tablet Take  by mouth.     • aspirin 81 MG chewable tablet Chew 81 mg daily.     • Fluticasone Furoate (ARNUITY ELLIPTA) 200 MCG/ACT aerosol powder  Inhale 1 puff Daily.     • folic acid (FOLVITE) 1 MG tablet Take 1 mg by mouth Daily.     • guaiFENesin (MUCUS RELIEF) 400 MG tablet Take 400 mg by mouth 2 (Two) Times a Day.     • isosorbide mononitrate (IMDUR) 30 MG 24 hr tablet TAKE 1/2 TABLET DAILY 30  tablet 5   • losartan (COZAAR) 50 MG tablet Take 1 tablet by mouth Daily. 30 tablet 5   • methotrexate 2.5 MG tablet Take 2.5 mg by mouth 1 (One) Time Per Week.     • omeprazole (priLOSEC) 20 MG capsule Take 20 mg by mouth 2 (Two) Times a Day.     • triamterene-hydrochlorothiazide (MAXZIDE-25) 37.5-25 MG per tablet Take 1 tablet by mouth Daily.     • Umeclidinium Bromide (INCRUSE ELLIPTA) 62.5 MCG/INH aerosol powder  Inhale 1 puff Daily.     • furosemide (LASIX) 20 MG tablet Take 1 tablet by mouth Daily As Needed (edema). 30 tablet 11   • nitroglycerin (NITROSTAT) 0.4 MG SL tablet 1 under the tongue as needed for angina, may repeat q5mins for up three doses 30 tablet 5     No current facility-administered medications for this visit.        ALLERGIES    Diovan [valsartan] and Iodine    Past Medical History:   Diagnosis Date   • Anxiety and depression    • Arthritis    • Chest pain    • Chronic cough    • Diabetes    • Fibromyalgia    • GERD (gastroesophageal reflux disease)    • High triglycerides    • Hypertension    • Irritable bowel syndrome    • Lupus (systemic lupus erythematosus)    • Migraines    • Scleroderma    • Ulcerative colitis        Social History     Social History   • Marital status:      Spouse name: N/A   • Number of children: 0   • Years of education: N/A     Occupational History   •  [employer]     Social History Main Topics   • Smoking status: Former Smoker     Types: Cigarettes     Quit date: 2002   • Smokeless tobacco: Never Used   • Alcohol use No   • Drug use: No   • Sexual activity: Defer     Other Topics Concern   • Not on file     Social History Narrative       Family History   Problem Relation Age of Onset   • COPD Mother    • Hypertension Mother    • Diverticulitis Father 45   • No Known Problems Son    • No Known Problems Daughter    • Stomach cancer Paternal Grandmother        Review of Systems   Constitutional: Positive for fatigue. Negative for  "diaphoresis.   HENT: Negative for rhinorrhea and sneezing.         Phlemb in throat   Eyes: Positive for visual disturbance (wears glasses).   Respiratory: Positive for cough (chronic for 1 yr) and shortness of breath (fine at rest with any activity and with CP ).    Cardiovascular: Positive for chest pain (occas. \"squeezing\" with activity; stops with rest ), palpitations (occas.) and leg swelling (left foot and leg).   Gastrointestinal: Positive for nausea. Negative for vomiting.   Endocrine: Negative.    Genitourinary: Positive for enuresis (total incontinence).   Musculoskeletal: Positive for arthralgias (rheum. and osteo.), back pain (low back ), gait problem (ambulates with cane) and myalgias.   Skin: Negative.    Allergic/Immunologic: Positive for environmental allergies (seasonal).   Neurological: Positive for dizziness (with anxiety and dysnea) and light-headedness (with anxiety and dyspnea; CP). Negative for syncope.   Hematological: Negative.    Psychiatric/Behavioral: Positive for sleep disturbance.       Objective   /82 (BP Location: Left arm, Patient Position: Sitting)  Pulse 105  Ht 60\" (152.4 cm)  Wt 261 lb 3.2 oz (118 kg)  SpO2 98%  BMI 51.01 kg/m2  Lab Results (most recent)     None        Physical Exam   Constitutional: She is oriented to person, place, and time. Vital signs are normal. She appears well-developed and well-nourished. She is active and cooperative.   HENT:   Head: Normocephalic.   Eyes: Lids are normal.   Wears glasses    Neck: Normal carotid pulses, no hepatojugular reflux and no JVD present. Carotid bruit is not present.   Cardiovascular: Regular rhythm and normal heart sounds.  Tachycardia present.    Pulses:       Radial pulses are 2+ on the right side, and 2+ on the left side.        Dorsalis pedis pulses are 2+ on the right side, and 2+ on the left side.        Posterior tibial pulses are 2+ on the right side, and 2+ on the left side.   Trace edema BLE.  "   Pulmonary/Chest: Effort normal and breath sounds normal.   Abdominal: Normal appearance.   Musculoskeletal:   Uses a cane    Neurological: She is alert and oriented to person, place, and time.   Skin: Skin is warm, dry and intact.   Psychiatric: She has a normal mood and affect. Her speech is normal and behavior is normal. Judgment and thought content normal. Cognition and memory are normal.         Assessment/Plan      Diagnosis Plan   1. Chest pain, unspecified type  ECG 12 Lead   2. Essential hypertension  ECG 12 Lead   3. Shortness of breath     4. Chronic cough     5. Gastroesophageal reflux disease without esophagitis     6. Peripheral edema  furosemide (LASIX) 20 MG tablet       Return in about 6 weeks (around 5/25/2017).         Patient does not want to pursue [facility] until she has the CT of the chest and sees what it shows.  She will continue her medication regimen and use Lasix PRN for edema.  She will use nitro PRN for chest pain, no resolution she will go to the ER.  She will follow-up in 6 weeks or sooner if any changes.

## 2017-05-25 ENCOUNTER — OFFICE VISIT (OUTPATIENT)
Dept: CARDIOLOGY | Facility: CLINIC | Age: 62
End: 2017-05-25

## 2017-05-25 VITALS
WEIGHT: 264.4 LBS | BODY MASS INDEX: 51.91 KG/M2 | HEART RATE: 114 BPM | DIASTOLIC BLOOD PRESSURE: 72 MMHG | OXYGEN SATURATION: 98 % | SYSTOLIC BLOOD PRESSURE: 130 MMHG | HEIGHT: 60 IN

## 2017-05-25 DIAGNOSIS — E78.2 MIXED HYPERLIPIDEMIA: ICD-10-CM

## 2017-05-25 DIAGNOSIS — R60.9 PERIPHERAL EDEMA: ICD-10-CM

## 2017-05-25 DIAGNOSIS — I10 ESSENTIAL HYPERTENSION: Primary | ICD-10-CM

## 2017-05-25 PROBLEM — R60.0 PERIPHERAL EDEMA: Status: ACTIVE | Noted: 2017-05-25

## 2017-05-25 PROCEDURE — 99213 OFFICE O/P EST LOW 20 MIN: CPT | Performed by: NURSE PRACTITIONER

## 2017-08-11 ENCOUNTER — TELEPHONE (OUTPATIENT)
Dept: CARDIOLOGY | Facility: CLINIC | Age: 62
End: 2017-08-11

## 2017-08-11 DIAGNOSIS — I10 ESSENTIAL HYPERTENSION: Primary | ICD-10-CM

## 2017-08-11 RX ORDER — LOSARTAN POTASSIUM 50 MG/1
50 TABLET ORAL DAILY
Qty: 30 TABLET | Refills: 11 | Status: SHIPPED | OUTPATIENT
Start: 2017-08-11 | End: 2018-02-16 | Stop reason: SDUPTHER

## 2017-08-11 NOTE — TELEPHONE ENCOUNTER
----- Message from Anu Muse sent at 8/11/2017  8:07 AM EDT -----  Needs refills on her Losartan 50mg sent in to [facility] [place].

## 2017-10-03 ENCOUNTER — TELEPHONE (OUTPATIENT)
Dept: CARDIOLOGY | Facility: CLINIC | Age: 62
End: 2017-10-03

## 2017-10-03 DIAGNOSIS — R94.39 ABNORMAL STRESS TEST: ICD-10-CM

## 2017-10-03 RX ORDER — ISOSORBIDE MONONITRATE 30 MG/1
TABLET, EXTENDED RELEASE ORAL
Qty: 30 TABLET | Refills: 5 | Status: SHIPPED | OUTPATIENT
Start: 2017-10-03 | End: 2018-01-25 | Stop reason: SDUPTHER

## 2017-10-03 NOTE — TELEPHONE ENCOUNTER
----- Message from Francesca Reza sent at 10/3/2017  8:57 AM EDT -----  Contact: PATIENT  THE PATIENT STOPPED BY AND REQUESTED A REFILL ON HER ISOSORBIDE 30 MG.  [facility]  SHE CAN BE REACHED [phone].  THANKS

## 2017-11-28 ENCOUNTER — OFFICE VISIT (OUTPATIENT)
Dept: CARDIOLOGY | Facility: CLINIC | Age: 62
End: 2017-11-28

## 2017-11-28 VITALS
HEART RATE: 120 BPM | BODY MASS INDEX: 50.3 KG/M2 | HEIGHT: 60 IN | DIASTOLIC BLOOD PRESSURE: 81 MMHG | WEIGHT: 256.2 LBS | SYSTOLIC BLOOD PRESSURE: 142 MMHG | OXYGEN SATURATION: 98 %

## 2017-11-28 DIAGNOSIS — E78.2 MIXED HYPERLIPIDEMIA: ICD-10-CM

## 2017-11-28 DIAGNOSIS — R06.02 SHORTNESS OF BREATH: ICD-10-CM

## 2017-11-28 DIAGNOSIS — R07.2 PRECORDIAL PAIN: Primary | ICD-10-CM

## 2017-11-28 DIAGNOSIS — I10 ESSENTIAL HYPERTENSION: ICD-10-CM

## 2017-11-28 PROCEDURE — 99213 OFFICE O/P EST LOW 20 MIN: CPT | Performed by: NURSE PRACTITIONER

## 2017-11-28 PROCEDURE — 93000 ELECTROCARDIOGRAM COMPLETE: CPT | Performed by: NURSE PRACTITIONER

## 2017-11-28 RX ORDER — ESTRADIOL 0.1 MG/G
CREAM VAGINAL
COMMUNITY
Start: 2017-08-21 | End: 2019-04-04

## 2017-11-28 RX ORDER — TROSPIUM CHLORIDE 20 MG/1
TABLET, FILM COATED ORAL 2 TIMES DAILY
COMMUNITY
Start: 2017-11-27 | End: 2019-04-04

## 2017-11-28 RX ORDER — SUCRALFATE 1 G/1
TABLET ORAL 2 TIMES DAILY
COMMUNITY
Start: 2017-10-17 | End: 2022-06-22

## 2017-11-28 NOTE — PATIENT INSTRUCTIONS
Sinus Tachycardia  Sinus tachycardia is a kind of fast heartbeat. In sinus tachycardia, the heart beats more than 100 times a minute. Sinus tachycardia starts in a part of the heart called the sinus node.  Sinus tachycardia may be harmless, or it may be a sign of a serious condition.  CAUSES  This condition may be caused by:  · Exercise or exertion.  · A fever.  · Pain.  · An injury.  · Loss of body fluids (dehydration).  · Severe bleeding (hemorrhage).  · Anxiety and stress.  · Certain substances, including:    Alcohol.    Caffeine.    Tobacco products.    Diet pills.    Illegal drugs.  · Medical conditions including:    Heart disease.    An infection.    An overactive thyroid (hyperthyroidism).    A lack of red blood cells (anemia).  SYMPTOMS  Symptoms of this condition include:  · A feeling that the heart is beating quickly (palpitation).  · Suddenly noticing your heartbeat (cardiac awareness).  · Dizziness.  · Tiredness (fatigue).  · Shortness of breath.  · Chest pain.  · Nausea.  · Fainting.  DIAGNOSIS  This condition is diagnosed with a physical exam and tests, such as:  · Blood tests.  · An electrocardiogram (ECG). This test measures the electrical activity of the heart.  · Holter monitoring. For this test, you wear a device that records your heartbeat for one or more days.  You may be referred to a heart specialist (cardiologist).  TREATMENT  Treatment for this condition depends on the cause or underlying condition. Treatment may involve:  · Treating the underlying condition.  · Taking new medicines or changing your current medicines as told by your health care provider.  · Making changes to your diet or lifestyle.  · Practicing relaxation methods.  HOME CARE INSTRUCTIONS  · Rest.  · Drink enough fluids to keep your urine clear or pale yellow.  · Do not use any tobacco products, such as cigarettes, chewing tobacco, and e-cigarettes. If you need help quitting, ask your health care provider.  · Do not use  illegal drugs, such as cocaine.  · Avoid:    Caffeine.    Alcohol.    Stimulants such as over-the-counter diet pills or pills that help you to stay awake.    Situations that cause anxiety or stress.  · Take over-the-counter and prescription medicines only as told by your health care provider.  · Keep all follow-up visits as told by your health care provider. This is important.  · Learn relaxation methods, like deep breathing, to help you when you get stressed or anxious.  SEEK MEDICAL CARE IF:  · You have a fever.  · You have vomiting or diarrhea that keeps happening (is persistent).  SEEK IMMEDIATE MEDICAL CARE IF:  · You have pain in your chest, upper arms, jaw, or neck.  · You become weak or dizzy.  · You feel faint.  · You have palpitations that do not go away.     This information is not intended to replace advice given to you by your health care provider. Make sure you discuss any questions you have with your health care provider.     Document Released: 01/25/2006 Document Revised: 04/10/2017 Document Reviewed: 07/01/2016  [employer] Interactive Patient Education ©2017 [employer]

## 2017-11-28 NOTE — PROGRESS NOTES
Subjective   Karyna Stewart is a 62 y.o. female     Chief Complaint   Patient presents with   • Hypertension     Here for 6 mo. f/u   • Hyperlipidemia   • Heartburn   • Irritable Bowel Syndrome   • Lupus       HPI    PROBLEM LIST:     1. Chest pain   1.1 Stress Test 2/22/17 - mild lateral wall ischemia; preserved LVEF; positive study without high risk markers   2. Shortness of breath   2.1 Echo 2/22/17 - mild LVH; EF 60-65%; DD II; mild MR, TR and MN; PA 20-25  3. Edema  4. Chronic Cough   5. Hypertension   6. Hyperlipidemia   7. Dizziness/ lightheadedness   8. Rheumatoid arthritis, lupus, scleroderma  9. Diabetes; diet controlled   10. IBS   11. GERD    Patient is a 62-year-old female who presents today for follow-up.  Patient says she does get chest tightness but she says she's been told by Dr. Hameed that it is costochondritis.  She denies any palpitations or fluttering.  She says that she will get dizzy/lightheaded from her cough.  She denies any presyncope, syncope, orthopnea PND.  She does get some swelling she says mostly in her left lower extremity.  She says that she does get short of breath with minimal activity.  She says she still been having a cough for over 2 years and that's why her tachycardias from.  She coughs regardless if she tries to talk do any activity or anything.  Her PCPs finally referring her to [facility] because nothing has been found thus far to determine the cause.  Patient is really depressed and frustrated with the situation.  Did discuss due to the fact that she did have up mildly positive stress test back in February we could consider left heart catheter however should would like to wait and see what pain of the chest.    Current Outpatient Prescriptions   Medication Sig Dispense Refill   • amLODIPine (NORVASC) 10 MG tablet Take  by mouth.     • aspirin 81 MG chewable tablet Chew 81 mg daily.     • Belimumab (BENLYSTA IV) Infuse  into a venous catheter. Every 4-6 weeks     •  ESTRACE VAGINAL 0.1 MG/GM vaginal cream Applies M/W/F     • folic acid (FOLVITE) 1 MG tablet Take 1 mg by mouth Daily.     • furosemide (LASIX) 20 MG tablet Take 1 tablet by mouth Daily As Needed (edema). 30 tablet 11   • isosorbide mononitrate (IMDUR) 30 MG 24 hr tablet TAKE 1/2 TABLET DAILY 30 tablet 5   • losartan (COZAAR) 50 MG tablet Take 1 tablet by mouth Daily. 30 tablet 11   • omeprazole (priLOSEC) 20 MG capsule Take 20 mg by mouth 2 (Two) Times a Day.     • sucralfate (CARAFATE) 1 g tablet 2 (Two) Times a Day.     • triamterene-hydrochlorothiazide (MAXZIDE-25) 37.5-25 MG per tablet Take 1 tablet by mouth Daily.     • trospium (SANCTURA) 20 MG tablet 2 (Two) Times a Day.     • nitroglycerin (NITROSTAT) 0.4 MG SL tablet 1 under the tongue as needed for angina, may repeat q5mins for up three doses 30 tablet 5     No current facility-administered medications for this visit.        ALLERGIES    Diovan [valsartan] and Iodine    Past Medical History:   Diagnosis Date   • Anxiety and depression    • Arthritis    • Chest pain    • Chronic cough    • Diabetes    • Fibromyalgia    • GERD (gastroesophageal reflux disease)    • High triglycerides    • Hypertension    • Irritable bowel syndrome    • Lupus (systemic lupus erythematosus)    • Migraines    • Scleroderma    • Ulcerative colitis        Social History     Social History   • Marital status:      Spouse name: N/A   • Number of children: 0   • Years of education: N/A     Occupational History   •  [employer]     Social History Main Topics   • Smoking status: Former Smoker     Types: Cigarettes     Quit date: 2002   • Smokeless tobacco: Never Used   • Alcohol use No   • Drug use: No   • Sexual activity: Defer     Other Topics Concern   • Not on file     Social History Narrative       Family History   Problem Relation Age of Onset   • COPD Mother    • Hypertension Mother    • Diverticulitis Father 45   • No Known Problems Son   "  • No Known Problems Daughter    • Stomach cancer Paternal Grandmother        Review of Systems   Constitutional: Positive for fatigue. Negative for diaphoresis.   HENT: Positive for congestion. Negative for rhinorrhea and sneezing.         Throat dryness, scratchy   Eyes: Positive for visual disturbance (wears glasses).   Respiratory: Positive for cough (for 2 years, going to [facility] 12/5/17; now from time to time streaks of blood ), chest tightness and shortness of breath.    Cardiovascular: Positive for chest pain (tightness; told by Dr. Hameed it could be costochrondritis ) and leg swelling (left). Negative for palpitations.   Gastrointestinal: Positive for abdominal pain, constipation and diarrhea. Negative for nausea and vomiting.   Endocrine: Negative.    Genitourinary: Positive for difficulty urinating (seeing Urologist ).        Incontinence if doesn't use cream and take med.   Musculoskeletal: Positive for arthralgias, back pain, gait problem (ambulates with cane), myalgias (fibromyalgia ) and neck pain.        Started infusions again in Aug due to elevated Sed rate, however, she does not feel any better and Sed rate has not come down    Skin: Negative.    Allergic/Immunologic: Positive for environmental allergies.   Neurological: Positive for dizziness (from cough ) and light-headedness (from cough ).   Hematological: Bruises/bleeds easily (bruise).   Psychiatric/Behavioral: Positive for sleep disturbance.       Objective   /81 (BP Location: Left arm, Patient Position: Sitting)  Pulse 120 Comment: EKG  Ht 60\" (152.4 cm)  Wt 256 lb 3.2 oz (116 kg)  SpO2 98%  BMI 50.04 kg/m2  Lab Results (most recent)     None        Physical Exam   Constitutional: She is oriented to person, place, and time. Vital signs are normal. She appears well-developed and well-nourished. She is active and cooperative.   HENT:   Head: Normocephalic.   Eyes: Lids are normal.   Wears glasses    Neck: Normal carotid " pulses, no hepatojugular reflux and no JVD present. Carotid bruit is not present.   Cardiovascular: Regular rhythm and normal heart sounds.  Tachycardia present.    Pulses:       Radial pulses are 2+ on the right side, and 2+ on the left side.        Dorsalis pedis pulses are 2+ on the right side, and 2+ on the left side.        Posterior tibial pulses are 2+ on the right side, and 2+ on the left side.   No edema BLE.    Pulmonary/Chest: Effort normal and breath sounds normal.   Abdominal: Normal appearance and bowel sounds are normal.   Musculoskeletal:        Lumbar back: She exhibits pain.   Uses a cane   Neurological: She is alert and oriented to person, place, and time.   Skin: Skin is warm, dry and intact.   Psychiatric: Her speech is normal and behavior is normal. Judgment and thought content normal. Cognition and memory are normal. She exhibits a depressed mood.         Assessment/Plan      Diagnosis Plan   1. Precordial pain  ECG 12 Lead   2. Shortness of breath  ECG 12 Lead   3. Essential hypertension     4. Mixed hyperlipidemia         Return in about 8 weeks (around 1/23/2018).          ECG 12 Lead  Date/Time: 11/28/2017 1:55 PM  Performed by: MONSTER WOOD  Authorized by: MONSTER WOOD   Rhythm: sinus tachycardia  Ectopy: PVCs  Rate: tachycardic  BPM: 120  T wave flattening noted on lead: nonspecific changes   QRS axis: normal  Clinical impression: non-specific ECG          Chest pain-patient will like to wait until she sees [facility] determine if she wants to have a left heart catheter or not.  Patient is on Imdur as well.  She does have nitroglycerin to use when necessary no resolution she is to go to the ER.  Shortness of breath-no change.  Hypertension-patient's doing well.  Hyperlipidemia- diet controlled.  Patient will continue her medication regimen.  She will follow-up in 8 weeks or sooner if any changes.  At this time she gone to [facility] determine if any further workup will be done by  us or not.

## 2018-01-22 ENCOUNTER — OFFICE VISIT (OUTPATIENT)
Dept: CARDIOLOGY | Facility: CLINIC | Age: 63
End: 2018-01-22

## 2018-01-22 VITALS
DIASTOLIC BLOOD PRESSURE: 69 MMHG | SYSTOLIC BLOOD PRESSURE: 126 MMHG | WEIGHT: 256 LBS | HEIGHT: 60 IN | BODY MASS INDEX: 50.26 KG/M2 | OXYGEN SATURATION: 99 % | HEART RATE: 116 BPM

## 2018-01-22 DIAGNOSIS — E78.2 MIXED HYPERLIPIDEMIA: ICD-10-CM

## 2018-01-22 DIAGNOSIS — R06.02 SHORTNESS OF BREATH: ICD-10-CM

## 2018-01-22 DIAGNOSIS — R07.9 CHEST PAIN, UNSPECIFIED TYPE: ICD-10-CM

## 2018-01-22 DIAGNOSIS — I10 ESSENTIAL HYPERTENSION: Primary | ICD-10-CM

## 2018-01-22 PROCEDURE — 99213 OFFICE O/P EST LOW 20 MIN: CPT | Performed by: NURSE PRACTITIONER

## 2018-01-22 RX ORDER — FLUTICASONE PROPIONATE 50 MCG
SPRAY, SUSPENSION (ML) NASAL
COMMUNITY
Start: 2017-12-05 | End: 2019-04-04

## 2018-01-22 NOTE — PATIENT INSTRUCTIONS
Edema  Edema is an abnormal buildup of fluids in your body tissues. Edema is somewhat dependent on gravity to pull the fluid to the lowest place in your body. That makes the condition more common in the legs and thighs (lower extremities). Painless swelling of the feet and ankles is common and becomes more likely as you get older. It is also common in looser tissues, like around your eyes.  When the affected area is squeezed, the fluid may move out of that spot and leave a dent for a few moments. This dent is called pitting.  What are the causes?  There are many possible causes of edema. Eating too much salt and being on your feet or sitting for a long time can cause edema in your legs and ankles. Hot weather may make edema worse. Common medical causes of edema include:  · Heart failure.  · Liver disease.  · Kidney disease.  · Weak blood vessels in your legs.  · Cancer.  · An injury.  · Pregnancy.  · Some medications.  · Obesity.  What are the signs or symptoms?  Edema is usually painless. Your skin may look swollen or shiny.  How is this diagnosed?  Your health care provider may be able to diagnose edema by asking about your medical history and doing a physical exam. You may need to have tests such as X-rays, an electrocardiogram, or blood tests to check for medical conditions that may cause edema.  How is this treated?  Edema treatment depends on the cause. If you have heart, liver, or kidney disease, you need the treatment appropriate for these conditions. General treatment may include:  · Elevation of the affected body part above the level of your heart.  · Compression of the affected body part. Pressure from elastic bandages or support stockings squeezes the tissues and forces fluid back into the blood vessels. This keeps fluid from entering the tissues.  · Restriction of fluid and salt intake.  · Use of a water pill (diuretic). These medications are appropriate only for some types of edema. They pull fluid out  of your body and make you urinate more often. This gets rid of fluid and reduces swelling, but diuretics can have side effects. Only use diuretics as directed by your health care provider.  Follow these instructions at home:  · Keep the affected body part above the level of your heart when you are lying down.  · Do not sit still or stand for prolonged periods.  · Do not put anything directly under your knees when lying down.  · Do not wear constricting clothing or garters on your upper legs.  · Exercise your legs to work the fluid back into your blood vessels. This may help the swelling go down.  · Wear elastic bandages or support stockings to reduce ankle swelling as directed by your health care provider.  · Eat a low-salt diet to reduce fluid if your health care provider recommends it.  · Only take medicines as directed by your health care provider.  Contact a health care provider if:  · Your edema is not responding to treatment.  · You have heart, liver, or kidney disease and notice symptoms of edema.  · You have edema in your legs that does not improve after elevating them.  · You have sudden and unexplained weight gain.  Get help right away if:  · You develop shortness of breath or chest pain.  · You cannot breathe when you lie down.  · You develop pain, redness, or warmth in the swollen areas.  · You have heart, liver, or kidney disease and suddenly get edema.  · You have a fever and your symptoms suddenly get worse.  This information is not intended to replace advice given to you by your health care provider. Make sure you discuss any questions you have with your health care provider.  Document Released: 12/18/2006 Document Revised: 05/25/2017 Document Reviewed: 10/10/2014  [employer] Interactive Patient Education © 2017 [employer]  Nonspecific Chest Pain  Chest pain can be caused by many different conditions. There is always a chance that your pain could be related to something serious, such as a heart  attack or a blood clot in your lungs. Chest pain can also be caused by conditions that are not life-threatening. If you have chest pain, it is very important to follow up with your health care provider.  What are the causes?  Causes of this condition include:  · Heartburn.  · Pneumonia or bronchitis.  · Anxiety or stress.  · Inflammation around your heart (pericarditis) or lung (pleuritis or pleurisy).  · A blood clot in your lung.  · A collapsed lung (pneumothorax). This can develop suddenly on its own (spontaneous pneumothorax) or from trauma to the chest.  · Shingles infection (varicella-zoster virus).  · Heart attack.  · Damage to the bones, muscles, and cartilage that make up your chest wall. This can include:  ¨ Bruised bones due to injury.  ¨ Strained muscles or cartilage due to frequent or repeated coughing or overwork.  ¨ Fracture to one or more ribs.  ¨ Sore cartilage due to inflammation (costochondritis).  What increases the risk?  Risk factors for this condition may include:  · Activities that increase your risk for trauma or injury to your chest.  · Respiratory infections or conditions that cause frequent coughing.  · Medical conditions or overeating that can cause heartburn.  · Heart disease or family history of heart disease.  · Conditions or health behaviors that increase your risk of developing a blood clot.  · Having had chicken pox (varicella zoster).  What are the signs or symptoms?  Chest pain can feel like:  · Burning or tingling on the surface of your chest or deep in your chest.  · Crushing, pressure, aching, or squeezing pain.  · Dull or sharp pain that is worse when you move, cough, or take a deep breath.  · Pain that is also felt in your back, neck, shoulder, or arm, or pain that spreads to any of these areas.  Your chest pain may come and go, or it may stay constant.  How is this diagnosed?  Lab tests or other studies may be needed to find the cause of your pain. Your health care provider  may have you take a test called an ECG (electrocardiogram). An ECG records your heartbeat patterns at the time the test is performed. You may also have other tests, such as:  · Transthoracic echocardiogram (TTE). In this test, sound waves are used to create a picture of the heart structures and to look at how blood flows through your heart.  · Transesophageal echocardiogram (VINOD). This is a more advanced imaging test that takes images from inside your body. It allows your health care provider to see your heart in finer detail.  · Cardiac monitoring. This allows your health care provider to monitor your heart rate and rhythm in real time.  · Holter monitor. This is a portable device that records your heartbeat and can help to diagnose abnormal heartbeats. It allows your health care provider to track your heart activity for several days, if needed.  · Stress tests. These can be done through exercise or by taking medicine that makes your heart beat more quickly.  · Blood tests.  · Other imaging tests.  How is this treated?  Treatment depends on what is causing your chest pain. Treatment may include:  · Medicines. These may include:  ¨ Acid blockers for heartburn.  ¨ Anti-inflammatory medicine.  ¨ Pain medicine for inflammatory conditions.  ¨ Antibiotic medicine, if an infection is present.  ¨ Medicines to dissolve blood clots.  ¨ Medicines to treat coronary artery disease (CAD).  · Supportive care for conditions that do not require medicines. This may include:  ¨ Resting.  ¨ Applying heat or cold packs to injured areas.  ¨ Limiting activities until pain decreases.  Follow these instructions at home:  Medicines  · If you were prescribed an antibiotic, take it as told by your health care provider. Do not stop taking the antibiotic even if you start to feel better.  · Take over-the-counter and prescription medicines only as told by your health care provider.  Lifestyle  · Do not use any products that contain nicotine or  tobacco, such as cigarettes and e-cigarettes. If you need help quitting, ask your health care provider.  · Do not drink alcohol.  · Make lifestyle changes as directed by your health care provider. These may include:  ¨ Getting regular exercise. Ask your health care provider to suggest some activities that are safe for you.  ¨ Eating a heart-healthy diet. A registered dietitian can help you to learn healthy eating options.  ¨ Maintaining a healthy weight.  ¨ Managing diabetes, if necessary.  ¨ Reducing stress, such as with yoga or relaxation techniques.  General instructions  · Avoid any activities that bring on chest pain.  · If heartburn is the cause for your chest pain, raise (elevate) the head of your bed about 6 inches (15 cm) by putting blocks under the legs. Sleeping with more pillows does not effectively relieve heartburn because it only changes the position of your head.  · Keep all follow-up visits as told by your health care provider. This is important. This includes any further testing if your chest pain does not go away.  Contact a health care provider if:  · Your chest pain does not go away.  · You have a rash with blisters on your chest.  · You have a fever.  · You have chills.  Get help right away if:  · Your chest pain is worse.  · You have a cough that gets worse, or you cough up blood.  · You have severe pain in your abdomen.  · You have severe weakness.  · You faint.  · You have sudden, unexplained chest discomfort.  · You have sudden, unexplained discomfort in your arms, back, neck, or jaw.  · You have shortness of breath at any time.  · You suddenly start to sweat, or your skin gets clammy.  · You feel nauseous or you vomit.  · You suddenly feel light-headed or dizzy.  · Your heart begins to beat quickly, or it feels like it is skipping beats.  These symptoms may represent a serious problem that is an emergency. Do not wait to see if the symptoms will go away. Get medical help right away. Call  your local emergency services (911 in the [place] Do not drive yourself to the hospital.   This information is not intended to replace advice given to you by your health care provider. Make sure you discuss any questions you have with your health care provider.  Document Released: 09/27/2006 Document Revised: 09/11/2017 Document Reviewed: 09/11/2017  [employer] Interactive Patient Education © 2017 [employer]

## 2018-01-22 NOTE — PROGRESS NOTES
Subjective   Karyna Stewart is a 62 y.o. female     Chief Complaint   Patient presents with   • Hypertension     presents as a follow up       HPI    PROBLEM LIST:     1. Chest pain   1.1 Stress Test 2/22/17 - mild lateral wall ischemia; preserved LVEF; positive study without high risk markers   2. Shortness of breath   2.1 Echo 2/22/17 - mild LVH; EF 60-65%; DD II; mild MR, TR and VT; PA 20-25  3. Edema  4. Chronic Cough   5. Hypertension   6. Hyperlipidemia   7. Dizziness/ lightheadedness   8. Rheumatoid arthritis, lupus, scleroderma  9. Diabetes; diet controlled   10. IBS   11. GERD    Patient is a 62-year-old female who presents today for follow-up.  She says she still get chest tightness as midsternum.  She says that it can occur any time and when it does happen she will get anxious and it seems like it tightens up more.  She will also get lightheaded when this occurs.  She denies any palpitations, fluttering, dizziness, presyncope, syncope, orthopnea or PND.  She does get bilateral lower extremity edema.  She says now she is pretty much short of breath all the time with any activity that she does.  She did go to [facility] and they did a pulmonary function test which she says is normal.  They scheduled her to have a CT of the chest at the end of the month and there thought are that she have pulmonary hypertension.  She did have a right catheter back in 2015 which she had normal pulmonary pressures and her echocardiogram back in February of this past year her pulmonary pressures were 20-25.  Patient follows up with [facility] at the end of the month.    Current Outpatient Prescriptions   Medication Sig Dispense Refill   • amLODIPine (NORVASC) 10 MG tablet Take  by mouth.     • aspirin 81 MG chewable tablet Chew 81 mg daily.     • Belimumab (BENLYSTA IV) Infuse  into a venous catheter. Every 4-6 weeks     • ESTRACE VAGINAL 0.1 MG/GM vaginal cream Applies M/W/F     • fluticasone (FLONASE) 50 MCG/ACT nasal  spray      • folic acid (FOLVITE) 1 MG tablet Take 1 mg by mouth Daily.     • furosemide (LASIX) 20 MG tablet Take 1 tablet by mouth Daily As Needed (edema). 30 tablet 11   • isosorbide mononitrate (IMDUR) 30 MG 24 hr tablet TAKE 1/2 TABLET DAILY 30 tablet 5   • losartan (COZAAR) 50 MG tablet Take 1 tablet by mouth Daily. 30 tablet 11   • nitroglycerin (NITROSTAT) 0.4 MG SL tablet 1 under the tongue as needed for angina, may repeat q5mins for up three doses 30 tablet 5   • omeprazole (priLOSEC) 20 MG capsule Take 20 mg by mouth 2 (Two) Times a Day.     • sucralfate (CARAFATE) 1 g tablet 2 (Two) Times a Day.     • triamterene-hydrochlorothiazide (MAXZIDE-25) 37.5-25 MG per tablet Take 1 tablet by mouth Daily.     • trospium (SANCTURA) 20 MG tablet 2 (Two) Times a Day.       No current facility-administered medications for this visit.        ALLERGIES    Diovan [valsartan] and Iodine    Past Medical History:   Diagnosis Date   • Anxiety and depression    • Arthritis    • Chest pain    • Chronic cough    • Diabetes    • Fibromyalgia    • GERD (gastroesophageal reflux disease)    • High triglycerides    • Hypertension    • Irritable bowel syndrome    • Lupus (systemic lupus erythematosus)    • Migraines    • Scleroderma    • Ulcerative colitis        Social History     Social History   • Marital status:      Spouse name: N/A   • Number of children: 0   • Years of education: N/A     Occupational History   •  [employer]     Social History Main Topics   • Smoking status: Former Smoker     Types: Cigarettes     Quit date: 2002   • Smokeless tobacco: Never Used   • Alcohol use No   • Drug use: No   • Sexual activity: Defer     Other Topics Concern   • Not on file     Social History Narrative       Family History   Problem Relation Age of Onset   • COPD Mother    • Hypertension Mother    • Diverticulitis Father 45   • No Known Problems Son    • No Known Problems Daughter    • Stomach  "cancer Paternal Grandmother        Review of Systems   Constitutional: Positive for fatigue. Negative for diaphoresis.   HENT: Positive for congestion. Negative for rhinorrhea, sinus pressure and sneezing.    Eyes: Positive for visual disturbance ( wears glasses).   Respiratory: Positive for cough (for 3 years now; gets a little phlegm, but not much; inhalers are not helping ) and shortness of breath (any time she is active at all ). Negative for chest tightness.    Cardiovascular: Positive for chest pain (chest tightness; midsternum; can occur at any time; will get anxious too when it tightens up ) and leg swelling (no change ). Negative for palpitations.   Gastrointestinal: Positive for nausea (when cough a lot ). Negative for constipation, diarrhea and vomiting.   Endocrine: Negative.    Genitourinary: Positive for frequency. Negative for difficulty urinating.   Musculoskeletal: Positive for arthralgias (RA), back pain, gait problem (uses a cane ) and neck pain. Negative for myalgias.   Skin: Negative.    Allergic/Immunologic: Positive for environmental allergies.   Neurological: Positive for light-headedness (when she has coughing fits and with CP ) and headaches. Negative for dizziness and syncope.   Hematological: Bruises/bleeds easily (bruises easily ).   Psychiatric/Behavioral: The patient is nervous/anxious.        Objective   /69 (BP Location: Left arm, Patient Position: Sitting)  Pulse 116  Ht 152.4 cm (60\")  Wt 116 kg (256 lb)  SpO2 99%  BMI 50 kg/m2  Vitals:    01/22/18 1048   BP: 126/69   BP Location: Left arm   Patient Position: Sitting   Pulse: 116   SpO2: 99%   Weight: 116 kg (256 lb)   Height: 152.4 cm (60\")      Lab Results (most recent)     None        Physical Exam   Constitutional: She is oriented to person, place, and time. Vital signs are normal. She appears well-developed and well-nourished. She is active and cooperative.   HENT:   Head: Normocephalic.   Eyes: Lids are normal. "   Wears glasses    Neck: Normal carotid pulses, no hepatojugular reflux and no JVD present. Carotid bruit is not present.   Cardiovascular: Regular rhythm and normal heart sounds.  Tachycardia present.    Pulses:       Radial pulses are 2+ on the right side, and 2+ on the left side.        Dorsalis pedis pulses are 2+ on the right side, and 2+ on the left side.        Posterior tibial pulses are 2+ on the right side, and 2+ on the left side.   No edema BLE.    Pulmonary/Chest: Effort normal and breath sounds normal.   Abdominal: Normal appearance and bowel sounds are normal.   Musculoskeletal:   Uses a cane    Neurological: She is alert and oriented to person, place, and time.   Skin: Skin is warm, dry and intact.   Psychiatric: She has a normal mood and affect. Her speech is normal and behavior is normal. Judgment and thought content normal. Cognition and memory are normal.       Procedure   Procedures         Assessment/Plan      Diagnosis Plan   1. Essential hypertension     2. Mixed hyperlipidemia     3. Shortness of breath     4. Chest pain, unspecified type         Return in about 8 weeks (around 3/19/2018).    Hypertension-patient doing well.  Hyperlipidemia-patient is diet controlled.  Shortness of breath-she has had no improvement.  Chest pain-patient will wait until she goes back to [facility] to see if they're going to do a heart catheter to assess pulmonary pressures if so she will let them do the catheter.  If not she will let us know and we will schedule it for her with [facility] in [place].  She will continue her medication regimen.  She will use nitroglycerin when necessary for chest pain no resolution she will go to the ER.  She will follow-up in 8 weeks or sooner if any changes.       Electronically signed by:

## 2018-01-25 DIAGNOSIS — R94.39 ABNORMAL STRESS TEST: ICD-10-CM

## 2018-01-25 RX ORDER — ISOSORBIDE MONONITRATE 30 MG/1
TABLET, EXTENDED RELEASE ORAL
Qty: 30 TABLET | Refills: 5 | Status: SHIPPED | OUTPATIENT
Start: 2018-01-25 | End: 2018-02-16 | Stop reason: SDUPTHER

## 2018-02-16 ENCOUNTER — TELEPHONE (OUTPATIENT)
Dept: CARDIOLOGY | Facility: CLINIC | Age: 63
End: 2018-02-16

## 2018-02-16 DIAGNOSIS — R94.39 ABNORMAL STRESS TEST: ICD-10-CM

## 2018-02-16 DIAGNOSIS — I10 ESSENTIAL HYPERTENSION: ICD-10-CM

## 2018-02-16 DIAGNOSIS — R60.9 PERIPHERAL EDEMA: ICD-10-CM

## 2018-02-16 RX ORDER — TRIAMTERENE AND HYDROCHLOROTHIAZIDE 37.5; 25 MG/1; MG/1
1 TABLET ORAL DAILY
Qty: 30 TABLET | Refills: 11 | Status: SHIPPED | OUTPATIENT
Start: 2018-02-16 | End: 2019-04-04

## 2018-02-16 RX ORDER — ISOSORBIDE MONONITRATE 30 MG/1
TABLET, EXTENDED RELEASE ORAL
Qty: 30 TABLET | Refills: 5 | Status: SHIPPED | OUTPATIENT
Start: 2018-02-16 | End: 2018-03-09 | Stop reason: SDUPTHER

## 2018-02-16 RX ORDER — FUROSEMIDE 20 MG/1
20 TABLET ORAL DAILY PRN
Qty: 30 TABLET | Refills: 11 | Status: SHIPPED | OUTPATIENT
Start: 2018-02-16 | End: 2019-04-04 | Stop reason: SDUPTHER

## 2018-02-16 RX ORDER — ASPIRIN 81 MG/1
81 TABLET, CHEWABLE ORAL DAILY
Qty: 30 TABLET | Refills: 11 | Status: SHIPPED | OUTPATIENT
Start: 2018-02-16

## 2018-02-16 RX ORDER — LOSARTAN POTASSIUM 50 MG/1
50 TABLET ORAL DAILY
Qty: 30 TABLET | Refills: 11 | Status: SHIPPED | OUTPATIENT
Start: 2018-02-16 | End: 2018-03-19 | Stop reason: SDUPTHER

## 2018-02-16 RX ORDER — AMLODIPINE BESYLATE 10 MG/1
10 TABLET ORAL DAILY
Qty: 30 TABLET | Refills: 11 | Status: SHIPPED | OUTPATIENT
Start: 2018-02-16 | End: 2021-12-22 | Stop reason: SDUPTHER

## 2018-02-16 NOTE — TELEPHONE ENCOUNTER
----- Message from Pat Carlson LPN sent at 2/16/2018 11:53 AM EST -----   Requests refills on her prescriptions.

## 2018-03-09 DIAGNOSIS — R94.39 ABNORMAL STRESS TEST: ICD-10-CM

## 2018-03-09 RX ORDER — ISOSORBIDE MONONITRATE 30 MG/1
TABLET, EXTENDED RELEASE ORAL
Qty: 90 TABLET | Refills: 3 | Status: SHIPPED | OUTPATIENT
Start: 2018-03-09 | End: 2019-04-04 | Stop reason: SDUPTHER

## 2018-03-19 ENCOUNTER — OFFICE VISIT (OUTPATIENT)
Dept: CARDIOLOGY | Facility: CLINIC | Age: 63
End: 2018-03-19

## 2018-03-19 ENCOUNTER — APPOINTMENT (OUTPATIENT)
Dept: LAB | Facility: HOSPITAL | Age: 63
End: 2018-03-19

## 2018-03-19 VITALS
DIASTOLIC BLOOD PRESSURE: 80 MMHG | WEIGHT: 255 LBS | OXYGEN SATURATION: 97 % | BODY MASS INDEX: 50.06 KG/M2 | HEART RATE: 110 BPM | SYSTOLIC BLOOD PRESSURE: 140 MMHG | HEIGHT: 60 IN

## 2018-03-19 DIAGNOSIS — R94.39 ABNORMAL STRESS TEST: ICD-10-CM

## 2018-03-19 DIAGNOSIS — R06.02 SHORTNESS OF BREATH: ICD-10-CM

## 2018-03-19 DIAGNOSIS — E78.2 MIXED HYPERLIPIDEMIA: ICD-10-CM

## 2018-03-19 DIAGNOSIS — I27.20 PULMONARY HYPERTENSION (HCC): ICD-10-CM

## 2018-03-19 DIAGNOSIS — Z00.00 HEALTHCARE MAINTENANCE: ICD-10-CM

## 2018-03-19 DIAGNOSIS — R07.9 CHEST PAIN, UNSPECIFIED TYPE: Primary | ICD-10-CM

## 2018-03-19 DIAGNOSIS — I10 ESSENTIAL HYPERTENSION: ICD-10-CM

## 2018-03-19 PROCEDURE — 80048 BASIC METABOLIC PNL TOTAL CA: CPT | Performed by: NURSE PRACTITIONER

## 2018-03-19 PROCEDURE — 99214 OFFICE O/P EST MOD 30 MIN: CPT | Performed by: NURSE PRACTITIONER

## 2018-03-19 RX ORDER — CLOPIDOGREL BISULFATE 75 MG/1
75 TABLET ORAL DAILY
Qty: 30 TABLET | Refills: 11 | Status: SHIPPED | OUTPATIENT
Start: 2018-03-19 | End: 2018-04-03

## 2018-03-19 RX ORDER — GABAPENTIN 100 MG/1
100 CAPSULE ORAL 2 TIMES DAILY
COMMUNITY
Start: 2018-02-22 | End: 2018-10-03

## 2018-03-19 RX ORDER — LOSARTAN POTASSIUM 50 MG/1
100 TABLET ORAL DAILY
Qty: 60 TABLET | Refills: 11 | Status: SHIPPED | OUTPATIENT
Start: 2018-03-19 | End: 2019-04-04 | Stop reason: SDUPTHER

## 2018-03-19 NOTE — PROGRESS NOTES
Subjective   Karyna Stewart is a 62 y.o. female     Chief Complaint   Patient presents with   • Hypertension     presents as a follow up   • Med Refill     patient needs 90 day supply on refills        HPI    PROBLEM LIST:     1. Chest pain   1.1 Stress Test 2/22/17 - mild lateral wall ischemia; preserved LVEF; positive study without high risk markers   2. Shortness of breath   2.1 Echo 2/22/17 - mild LVH; EF 60-65%; DD II; mild MR, TR and MD; PA 20-25  3. Edema  4. Chronic Cough   5. Hypertension   6. Hyperlipidemia   7. Dizziness/ lightheadedness   8. Rheumatoid arthritis, lupus, scleroderma  9. Diabetes; diet controlled   10. IBS   11. GERD    Patient is a  62-year-old female who presents today for a follow-up.  She continues to have chest pain, left anterior pressure that can occur at anytime. She will get short of breath and lightheaded.  She generally does not take nitro, but she is on imdur.  She Denies any palpitations, fluttering, dizziness, presyncope, syncope, orthopnea, PND or edema.  She is short of breath pretty much at any time whether she is active or sitting still.  She was supposed to have a follow-up with [facility] however the people who were spouse to take her with unable to give her right side she had to reschedule and they had openings until the summer.  PCP monitors her cholesterol.  She does have a pain in her right leg from her hip to her knee where she has injured a nerve.    Current Outpatient Prescriptions   Medication Sig Dispense Refill   • amLODIPine (NORVASC) 10 MG tablet Take 1 tablet by mouth Daily. 30 tablet 11   • aspirin 81 MG chewable tablet Chew 1 tablet Daily. 30 tablet 11   • Belimumab (BENLYSTA IV) Infuse  into a venous catheter. Every 4-6 weeks     • ESTRACE VAGINAL 0.1 MG/GM vaginal cream Applies M/W/F     • fluticasone (FLONASE) 50 MCG/ACT nasal spray      • folic acid (FOLVITE) 1 MG tablet Take 1 mg by mouth Daily.     • furosemide (LASIX) 20 MG tablet Take 1  tablet by mouth Daily As Needed (edema). 30 tablet 11   • gabapentin (NEURONTIN) 100 MG capsule Take 100 mg by mouth 2 (Two) Times a Day. 2 tablets at bedtime     • isosorbide mononitrate (IMDUR) 30 MG 24 hr tablet TAKE 1/2 TABLET DAILY 90 tablet 3   • losartan (COZAAR) 50 MG tablet Take 2 tablets by mouth Daily. 60 tablet 11   • nitroglycerin (NITROSTAT) 0.4 MG SL tablet 1 under the tongue as needed for angina, may repeat q5mins for up three doses 30 tablet 5   • omeprazole (priLOSEC) 20 MG capsule Take 20 mg by mouth 2 (Two) Times a Day.     • sucralfate (CARAFATE) 1 g tablet 2 (Two) Times a Day.     • triamterene-hydrochlorothiazide (MAXZIDE-25) 37.5-25 MG per tablet Take 1 tablet by mouth Daily. 30 tablet 11   • trospium (SANCTURA) 20 MG tablet 2 (Two) Times a Day.     • clopidogrel (PLAVIX) 75 MG tablet Take 1 tablet by mouth Daily. 30 tablet 11     No current facility-administered medications for this visit.        ALLERGIES    Diovan [valsartan] and Iodine    Past Medical History:   Diagnosis Date   • Anxiety and depression    • Arthritis    • Chest pain    • Chronic cough    • Diabetes    • Fibromyalgia    • GERD (gastroesophageal reflux disease)    • High triglycerides    • Hypertension    • Irritable bowel syndrome    • Lupus (systemic lupus erythematosus)    • Migraines    • Scleroderma    • Ulcerative colitis        Social History     Social History   • Marital status:      Spouse name: N/A   • Number of children: 0   • Years of education: N/A     Occupational History   •  [employer]     Social History Main Topics   • Smoking status: Former Smoker     Types: Cigarettes     Quit date: 2002   • Smokeless tobacco: Never Used   • Alcohol use No   • Drug use: No   • Sexual activity: Defer     Other Topics Concern   • Not on file     Social History Narrative   • No narrative on file       Family History   Problem Relation Age of Onset   • COPD Mother    • Hypertension  "Mother    • Diverticulitis Father 45   • No Known Problems Son    • No Known Problems Daughter    • Stomach cancer Paternal Grandmother        Review of Systems   Constitutional: Positive for fatigue. Negative for diaphoresis.   HENT: Positive for sore throat ( patient noticed a place in thorat causing coughing ). Negative for rhinorrhea, sinus pressure and sneezing.    Eyes: Positive for visual disturbance ( wears glasses ).   Respiratory: Positive for cough ( coughing up mucus ), chest tightness and shortness of breath (with any activity and rest, no change; with CP).    Cardiovascular: Positive for chest pain (left anterior pressure anytime ). Negative for palpitations and leg swelling.   Gastrointestinal: Positive for nausea (with coughing fits ). Negative for constipation, diarrhea and vomiting.   Endocrine: Negative.    Genitourinary: Negative for difficulty urinating.   Musculoskeletal: Positive for arthralgias, back pain, gait problem (uses a cane ) and myalgias ( numbness and pain. patient states she hit her thigh 6 weeks ago ). Negative for neck pain.        Numbing sensation with sharp stabbing pain right thigh s/p running into a corner of a cabinet; been one month    Skin: Negative.    Allergic/Immunologic: Negative for environmental allergies.   Neurological: Positive for light-headedness (with CP). Negative for dizziness, syncope and headaches.   Hematological: Does not bruise/bleed easily.   Psychiatric/Behavioral: The patient is not nervous/anxious.        Objective   /80 (BP Location: Left arm, Patient Position: Sitting)   Pulse 110   Ht 152.4 cm (60\")   Wt 116 kg (255 lb)   SpO2 97%   BMI 49.80 kg/m²   Vitals:    03/19/18 1029 03/19/18 1115   BP: 140/80    BP Location: Left arm    Patient Position: Sitting    Pulse: (!) 125 110   SpO2: 97%    Weight: 116 kg (255 lb)    Height: 152.4 cm (60\")       Lab Results (most recent)     None        Physical Exam   Constitutional: She is oriented " to person, place, and time. Vital signs are normal. She appears well-developed and well-nourished. She is active and cooperative.   HENT:   Head: Normocephalic.   Eyes: Lids are normal.   Wears glasses    Neck: Normal carotid pulses, no hepatojugular reflux and no JVD present. Carotid bruit is not present.   Cardiovascular: Regular rhythm and normal heart sounds.    Pulses:       Radial pulses are 2+ on the right side, and 2+ on the left side.        Dorsalis pedis pulses are 2+ on the right side, and 2+ on the left side.        Posterior tibial pulses are 2+ on the right side, and 2+ on the left side.   Trace edema BLE.    Pulmonary/Chest: Effort normal and breath sounds normal.   Abdominal: Normal appearance and bowel sounds are normal.   Musculoskeletal:   Uses a cane    Neurological: She is alert and oriented to person, place, and time.   Skin: Skin is warm, dry and intact.   Psychiatric: She has a normal mood and affect. Her speech is normal and behavior is normal. Judgment and thought content normal. Cognition and memory are normal.       Procedure   Procedures         Assessment/Plan      Diagnosis Plan   1. Chest pain, unspecified type  Cardiac catheterization    Catheter TDILUT [place] VIP PLS 7.5F 110CM    Catheter TDILUT [place] VIP PLS 7.5F 110CM   2. Essential hypertension  losartan (COZAAR) 50 MG tablet    Cardiac catheterization    Catheter TDILUT [place] VIP PLS 7.5F 110CM    Basic Metabolic Panel    Catheter TDILUT [place] VIP PLS 7.5F 110CM    Basic Metabolic Panel   3. Mixed hyperlipidemia  Cardiac catheterization    Catheter TDILUT [place] VIP PLS 7.5F 110CM    Catheter TDILUT [place] VIP PLS 7.5F 110CM   4. Shortness of breath  Cardiac catheterization    Catheter TDILUT [place] VIP PLS 7.5F 110CM    Catheter TDILUT [place] VIP PLS 7.5F 110CM   5. Abnormal stress test  clopidogrel (PLAVIX) 75 MG tablet    Cardiac catheterization    Catheter TDILUT [place] VIP PLS 7.5F 110CM     Catheter TDILUT [place] VIP PLS 7.5F 110CM   6. Pulmonary hypertension  Cardiac catheterization    Catheter TDILUT [place] VIP PLS 7.5F 110CM    Catheter TDILUT [place] VIP PLS 7.5F 110CM   7. Healthcare maintenance  Basic Metabolic Panel    Basic Metabolic Panel       Return for After testing.         Chest pain/hypertension/hyperlipidemia/shortness of breath/abnormal stress test/pulmonary hypertension-patient will have left heart catheter as well as right for pulmonary pressures.  She was thinking may return to do this at [place] but since she had to reschedule she cannot get into the summer she does not want to wait for this.  She will continue her medication regimen but she will start Plavix 75 mg daily.  She will use nitroglycerin when necessary for chest pain no resolution she will go to the ER.  She will increase her losartan 200 mg a day due to hypertension.  She can use nitroglycerin when necessary for chest pain no resolution she will go to the ER.  She will get a BMP prior to her heart catheter.  She will follow-up after heart catheter or sooner if any changes.      Discussed the patient's BMI with her. BMI is above normal parameters. Follow-up plan includes:  educational material.      Electronically signed by:

## 2018-03-19 NOTE — PATIENT INSTRUCTIONS
Obesity, Adult  Obesity is the condition of having too much total body fat. Being overweight or obese means that your weight is greater than what is considered healthy for your body size. Obesity is determined by a measurement called BMI. BMI is an estimate of body fat and is calculated from height and weight. For adults, a BMI of 30 or higher is considered obese.  Obesity can eventually lead to other health concerns and major illnesses, including:  · Stroke.  · Coronary artery disease (CAD).  · Type 2 diabetes.  · Some types of cancer, including cancers of the colon, breast, uterus, and gallbladder.  · Osteoarthritis.  · High blood pressure (hypertension).  · High cholesterol.  · Sleep apnea.  · Gallbladder stones.  · Infertility problems.  What are the causes?  The main cause of obesity is taking in (consuming) more calories than your body uses for energy. Other factors that contribute to this condition may include:  · Being born with genes that make you more likely to become obese.  · Having a medical condition that causes obesity. These conditions include:  ¨ Hypothyroidism.  ¨ Polycystic ovarian syndrome (PCOS).  ¨ Binge-eating disorder.  ¨ Cushing syndrome.  · Taking certain medicines, such as steroids, antidepressants, and seizure medicines.  · Not being physically active (sedentary lifestyle).  · Living where there are limited places to exercise safely or buy healthy foods.  · Not getting enough sleep.  What increases the risk?  The following factors may increase your risk of this condition:  · Having a family history of obesity.  · Being a woman of -American descent.  · Being a man of  descent.  What are the signs or symptoms?  Having excessive body fat is the main symptom of this condition.  How is this diagnosed?  This condition may be diagnosed based on:  · Your symptoms.  · Your medical history.  · A physical exam. Your health care provider may measure:  ¨ Your BMI. If you are an adult  with a BMI between 25 and less than 30, you are considered overweight. If you are an adult with a BMI of 30 or higher, you are considered obese.  ¨ The distances around your hips and your waist (circumferences). These may be compared to each other to help diagnose your condition.  ¨ Your skinfold thickness. Your health care provider may gently pinch a fold of your skin and measure it.  How is this treated?  Treatment for this condition often includes changing your lifestyle. Treatment may include some or all of the following:  · Dietary changes. Work with your health care provider and a dietitian to set a weight-loss goal that is healthy and reasonable for you. Dietary changes may include eating:  ¨ Smaller portions. A portion size is the amount of a particular food that is healthy for you to eat at one time. This varies from person to person.  ¨ Low-calorie or low-fat options.  ¨ More whole grains, fruits, and vegetables.  · Regular physical activity. This may include aerobic activity (cardio) and strength training.  · Medicine to help you lose weight. Your health care provider may prescribe medicine if you are unable to lose 1 pound a week after 6 weeks of eating more healthily and doing more physical activity.  · Surgery. Surgical options may include gastric banding and gastric bypass. Surgery may be done if:  ¨ Other treatments have not helped to improve your condition.  ¨ You have a BMI of 40 or higher.  ¨ You have life-threatening health problems related to obesity.  Follow these instructions at home:     Eating and drinking     · Follow recommendations from your health care provider about what you eat and drink. Your health care provider may advise you to:  ¨ Limit fast foods, sweets, and processed snack foods.  ¨ Choose low-fat options, such as low-fat milk instead of whole milk.  ¨ Eat 5 or more servings of fruits or vegetables every day.  ¨ Eat at home more often. This gives you more control over what you  eat.  ¨ Choose healthy foods when you eat out.  ¨ Learn what a healthy portion size is.  ¨ Keep low-fat snacks on hand.  ¨ Avoid sugary drinks, such as soda, fruit juice, iced tea sweetened with sugar, and flavored milk.  ¨ Eat a healthy breakfast.  · Drink enough water to keep your urine clear or pale yellow.  · Do not go without eating for long periods of time (do not fast) or follow a fad diet. Fasting and fad diets can be unhealthy and even dangerous.  Physical Activity   · Exercise regularly, as told by your health care provider. Ask your health care provider what types of exercise are safe for you and how often you should exercise.  · Warm up and stretch before being active.  · Cool down and stretch after being active.  · Rest between periods of activity.  Lifestyle   · Limit the time that you spend in front of your TV, computer, or video game system.  · Find ways to reward yourself that do not involve food.  · Limit alcohol intake to no more than 1 drink a day for nonpregnant women and 2 drinks a day for men. One drink equals 12 oz of beer, 5 oz of wine, or 1½ oz of hard liquor.  General instructions   · Keep a weight loss journal to keep track of the food you eat and how much you exercise you get.  · Take over-the-counter and prescription medicines only as told by your health care provider.  · Take vitamins and supplements only as told by your health care provider.  · Consider joining a support group. Your health care provider may be able to recommend a support group.  · Keep all follow-up visits as told by your health care provider. This is important.  Contact a health care provider if:  · You are unable to meet your weight loss goal after 6 weeks of dietary and lifestyle changes.  This information is not intended to replace advice given to you by your health care provider. Make sure you discuss any questions you have with your health care provider.  Document Released: 01/25/2006 Document Revised:  05/22/2017 Document Reviewed: 10/05/2016  [employer] Interactive Patient Education © 2017 [employer]  MyPlate from [employer]  The general, healthful diet is based on the 2010 Dietary Guidelines for Americans. The amount of food you need to eat from each food group depends on your age, sex, and level of physical activity and can be individualized by a dietitian. Go to ChooseMyPlate.gov for more information.  What do I need to know about the MyPlate plan?  · Enjoy your food, but eat less.  · Avoid oversized portions.  ¨ ½ of your plate should include fruits and vegetables.  ¨ ¼ of your plate should be grains.  ¨ ¼ of your plate should be protein.  Grains   · Make at least half of your grains whole grains.  · For a 2,000 calorie daily food plan, eat 6 oz every day.  · 1 oz is about 1 slice bread, 1 cup cereal, or ½ cup cooked rice, cereal, or pasta.  Vegetables   · Make half your plate fruits and vegetables.  · For a 2,000 calorie daily food plan, eat 2½ cups every day.  · 1 cup is about 1 cup raw or cooked vegetables or vegetable juice or 2 cups raw leafy greens.  Fruits   · Make half your plate fruits and vegetables.  · For a 2,000 calorie daily food plan, eat 2 cups every day.  · 1 cup is about 1 cup fruit or 100% fruit juice or ½ cup dried fruit.  Protein   · For a 2,000 calorie daily food plan, eat 5½ oz every day.  · 1 oz is about 1 oz meat, poultry, or fish, ¼ cup cooked beans, 1 egg, 1 Tbsp peanut butter, or ½ oz nuts or seeds.  Dairy   · Switch to fat-free or low-fat (1%) milk.  · For a 2,000 calorie daily food plan, eat 3 cups every day.  · 1 cup is about 1 cup milk or yogurt or soy milk (soy beverage), 1½ oz natural cheese, or 2 oz processed cheese.  Fats, Oils, and Empty Calories   · Only small amounts of oils are recommended.  · Empty calories are calories from solid fats or added sugars.  · Compare sodium in foods like soup, bread, and frozen meals. Choose the foods with lower numbers.  · Drink water instead  of sugary drinks.  What foods can I eat?  Grains   Whole grains such as whole wheat, quinoa, millet, and bulgur. Bread, rolls, and pasta made from whole grains. Brown or wild rice. Hot or cold cereals made from whole grains and without added sugar.  Vegetables   All fresh vegetables, especially fresh red, dark green, or orange vegetables. Peas and beans. Low-sodium frozen or canned vegetables prepared without added salt. Low-sodium vegetable juices.  Fruits   All fresh, frozen, and dried fruits. Canned fruit packed in water or fruit juice without added sugar. Fruit juices without added sugar.  Meats and Other Protein Sources   Boiled, baked, or grilled lean meat trimmed of fat. Skinless poultry. Fresh seafood and shellfish. Canned seafood packed in water. Unsalted nuts and unsalted nut butters. Tofu. Dried beans and pea. Eggs.  Dairy   Low-fat or fat-free milk, yogurt, and cheeses.  Sweets and Desserts   Frozen desserts made from low-fat milk.  Fats and Oils   Olive, peanut, and canola oils and margarine. Salad dressing and mayonnaise made from these oils.  Other   Soups and casseroles made from allowed ingredients and without added fat or salt.  The items listed above may not be a complete list of recommended foods or beverages. Contact your dietitian for more options.   What foods are not recommended?  Grains   Sweetened, low-fiber cereals. Packaged baked goods. Snack crackers and chips. Cheese crackers, butter crackers, and biscuits. Frozen waffles, sweet breads, doughnuts, pastries, packaged baking mixes, pancakes, cakes, and cookies.  Vegetables   Regular canned or frozen vegetables or vegetables prepared with salt. Canned tomatoes. Canned tomato sauce. Fried vegetables. Vegetables in cream sauce or cheese sauce.  Fruits   Fruits packed in syrup or made with added sugar.  Meats and Other Protein Sources   Marbled or fatty meats such as ribs. Poultry with skin. Fried meats, poultry, eggs, or fish. Sausages, hot  dogs, and deli meats such as pastrami, bologna, or salami.  Dairy   Whole milk, cream, cheeses made from whole milk, sour cream. Ice cream or yogurt made from whole milk or with added sugar.  Beverages   For adults, no more than one alcoholic drink per day. Regular soft drinks or other sugary beverages. Juice drinks.  Sweets and Desserts   Sugary or fatty desserts, candy, and other sweets.  Fats and Oils   Solid shortening or partially hydrogenated oils. Solid margarine. Margarine that contains trans fats. Butter.  The items listed above may not be a complete list of foods and beverages to avoid. Contact your dietitian for more information.   This information is not intended to replace advice given to you by your health care provider. Make sure you discuss any questions you have with your health care provider.  Document Released: 01/06/2009 Document Revised: 05/25/2017 Document Reviewed: 11/26/2014  [employer] Interactive Patient Education © 2017 [employer]    Coronary Angiogram With Stent  Coronary angiogram with stent placement is a procedure to widen or open a narrow blood vessel of the heart (coronary artery). Arteries may become blocked by cholesterol buildup (plaques) in the lining or wall. When a coronary artery becomes partially blocked, blood flow to that area decreases. This may lead to chest pain or a heart attack (myocardial infarction).  A stent is a small piece of metal that looks like mesh or a spring. Stent placement may be done as treatment for a heart attack or right after a coronary angiogram in which a blocked artery is found.  Let your health care provider know about:  · Any allergies you have.  · All medicines you are taking, including vitamins, herbs, eye drops, creams, and over-the-counter medicines.  · Any problems you or family members have had with anesthetic medicines.  · Any blood disorders you have.  · Any surgeries you have had.  · Any medical conditions you have.  · Whether you are  pregnant or may be pregnant.  What are the risks?  Generally, this is a safe procedure. However, problems may occur, including:  · Damage to the heart or its blood vessels.  · A return of blockage.  · Bleeding, infection, or bruising at the insertion site.  · A collection of blood under the skin (hematoma) at the insertion site.  · A blood clot in another part of the body.  · Kidney injury.  · Allergic reaction to the dye or contrast that is used.  · Bleeding into the abdomen (retroperitoneal bleeding).  What happens before the procedure?  Staying hydrated   Follow instructions from your health care provider about hydration, which may include:  · Up to 2 hours before the procedure - you may continue to drink clear liquids, such as water, clear fruit juice, black coffee, and plain tea.  Eating and drinking restrictions   Follow instructions from your health care provider about eating and drinking, which may include:  · 8 hours before the procedure - stop eating heavy meals or foods such as meat, fried foods, or fatty foods.  · 6 hours before the procedure - stop eating light meals or foods, such as toast or cereal.  · 2 hours before the procedure - stop drinking clear liquids.  Ask your health care provider about:  · Changing or stopping your regular medicines. This is especially important if you are taking diabetes medicines or blood thinners.  · Taking medicines such as ibuprofen. These medicines can thin your blood. Do not take these medicines before your procedure if your health care provider instructs you not to. Generally, aspirin is recommended before a procedure of passing a small, thin tube (catheter) through a blood vessel and into the heart (cardiac catheterization).  What happens during the procedure?  · An IV tube will be inserted into one of your veins.  · You will be given one or more of the following:  ¨ A medicine to help you relax (sedative).  ¨ A medicine to numb the area where the catheter will be  inserted into an artery (local anesthetic).  · To reduce your risk of infection:  ¨ Your health care team will wash or sanitize their hands.  ¨ Your skin will be washed with soap.  ¨ Hair may be removed from the area where the catheter will be inserted.  · Using a guide wire, the catheter will be inserted into an artery. The location may be in your groin, in your wrist, or in the fold of your arm (near your elbow).  · A type of X-ray (fluoroscopy) will be used to help guide the catheter to the opening of the arteries in the heart.  · A dye will be injected into the catheter, and X-rays will be taken. The dye will help to show where any narrowing or blockages are located in the arteries.  · A tiny wire will be guided to the blocked spot, and a balloon will be inflated to make the artery wider.  · The stent will be expanded and will crush the plaques into the wall of the vessel. The stent will hold the area open and improve the blood flow. Most stents have a drug coating to reduce the risk of the stent narrowing over time.  · The artery may be made wider using a drill, laser, or other tools to remove plaques.  · When the blood flow is better, the catheter will be removed. The lining of the artery will grow over the stent, which stays where it was placed.  This procedure may vary among health care providers and hospitals.  What happens after the procedure?  · If the procedure is done through the leg, you will be kept in bed lying flat for about 6 hours. You will be instructed to not bend and not cross your legs.  · The insertion site will be checked frequently.  · The pulse in your foot or wrist will be checked frequently.  · You may have additional blood tests, X-rays, and a test that records the electrical activity of your heart (electrocardiogram, or ECG).  This information is not intended to replace advice given to you by your health care provider. Make sure you discuss any questions you have with your health care  provider.  Document Released: 06/23/2004 Document Revised: 08/17/2017 Document Reviewed: 07/23/2017  [employer] Interactive Patient Education © 2017 [employer] Inc.

## 2018-03-20 ENCOUNTER — TRANSCRIBE ORDERS (OUTPATIENT)
Dept: ADMINISTRATIVE | Facility: HOSPITAL | Age: 63
End: 2018-03-20

## 2018-03-20 DIAGNOSIS — I20.0 UNSTABLE ANGINA (HCC): Primary | ICD-10-CM

## 2018-03-20 LAB
ANION GAP SERPL CALCULATED.3IONS-SCNC: 9.3 MMOL/L (ref 3.6–11.2)
BUN BLD-MCNC: 14 MG/DL (ref 7–21)
BUN/CREAT SERPL: 18.9 (ref 7–25)
CALCIUM SPEC-SCNC: 9.4 MG/DL (ref 7.7–10)
CHLORIDE SERPL-SCNC: 105 MMOL/L (ref 99–112)
CO2 SERPL-SCNC: 26.7 MMOL/L (ref 24.3–31.9)
CREAT BLD-MCNC: 0.74 MG/DL (ref 0.43–1.29)
GFR SERPL CREATININE-BSD FRML MDRD: 96 ML/MIN/1.73
GLUCOSE BLD-MCNC: 95 MG/DL (ref 70–110)
OSMOLALITY SERPL CALC.SUM OF ELEC: 281.5 MOSM/KG (ref 273–305)
POTASSIUM BLD-SCNC: 3.2 MMOL/L (ref 3.5–5.3)
SODIUM BLD-SCNC: 141 MMOL/L (ref 135–153)

## 2018-03-21 DIAGNOSIS — E87.6 HYPOKALEMIA: Primary | ICD-10-CM

## 2018-03-21 RX ORDER — POTASSIUM CHLORIDE 20 MEQ/1
20 TABLET, EXTENDED RELEASE ORAL DAILY
Qty: 30 TABLET | Refills: 6 | Status: SHIPPED | OUTPATIENT
Start: 2018-03-21 | End: 2018-10-03 | Stop reason: SDUPTHER

## 2018-03-26 ENCOUNTER — HOSPITAL ENCOUNTER (OUTPATIENT)
Facility: HOSPITAL | Age: 63
Setting detail: HOSPITAL OUTPATIENT SURGERY
Discharge: HOME | End: 2018-03-26
Attending: INTERNAL MEDICINE | Admitting: INTERNAL MEDICINE

## 2018-03-26 VITALS
WEIGHT: 251.32 LBS | RESPIRATION RATE: 18 BRPM | BODY MASS INDEX: 49.34 KG/M2 | SYSTOLIC BLOOD PRESSURE: 136 MMHG | DIASTOLIC BLOOD PRESSURE: 85 MMHG | HEIGHT: 60 IN | HEART RATE: 91 BPM | OXYGEN SATURATION: 95 % | TEMPERATURE: 97.7 F

## 2018-03-26 DIAGNOSIS — I20.0 UNSTABLE ANGINA (HCC): ICD-10-CM

## 2018-03-26 LAB
ANION GAP SERPL CALCULATED.3IONS-SCNC: 9 MMOL/L (ref 3–11)
BUN BLD-MCNC: 13 MG/DL (ref 9–23)
BUN/CREAT SERPL: 18.6 (ref 7–25)
CALCIUM SPEC-SCNC: 10 MG/DL (ref 8.7–10.4)
CHLORIDE SERPL-SCNC: 100 MMOL/L (ref 99–109)
CO2 SERPL-SCNC: 32 MMOL/L (ref 20–31)
CREAT BLD-MCNC: 0.7 MG/DL (ref 0.6–1.3)
DEPRECATED RDW RBC AUTO: 45.5 FL (ref 37–54)
ERYTHROCYTE [DISTWIDTH] IN BLOOD BY AUTOMATED COUNT: 15.4 % (ref 11.3–14.5)
GFR SERPL CREATININE-BSD FRML MDRD: 103 ML/MIN/1.73
GLUCOSE BLD-MCNC: 102 MG/DL (ref 70–100)
HCT VFR BLD AUTO: 42.4 % (ref 34.5–44)
HGB BLD-MCNC: 13.5 G/DL (ref 11.5–15.5)
MCH RBC QN AUTO: 25.6 PG (ref 27–31)
MCHC RBC AUTO-ENTMCNC: 31.8 G/DL (ref 32–36)
MCV RBC AUTO: 80.5 FL (ref 80–99)
PLATELET # BLD AUTO: 435 10*3/MM3 (ref 150–450)
PMV BLD AUTO: 9.3 FL (ref 6–12)
POTASSIUM BLD-SCNC: 3.4 MMOL/L (ref 3.5–5.5)
RBC # BLD AUTO: 5.27 10*6/MM3 (ref 3.89–5.14)
SODIUM BLD-SCNC: 141 MMOL/L (ref 132–146)
WBC NRBC COR # BLD: 11.34 10*3/MM3 (ref 3.5–10.8)

## 2018-03-26 PROCEDURE — 25010000002 MIDAZOLAM PER 1 MG: Performed by: INTERNAL MEDICINE

## 2018-03-26 PROCEDURE — C1769 GUIDE WIRE: HCPCS | Performed by: INTERNAL MEDICINE

## 2018-03-26 PROCEDURE — 63710000001 DIPHENHYDRAMINE PER 50 MG: Performed by: PHYSICIAN ASSISTANT

## 2018-03-26 PROCEDURE — 36415 COLL VENOUS BLD VENIPUNCTURE: CPT

## 2018-03-26 PROCEDURE — 0 IOPAMIDOL PER 1 ML: Performed by: INTERNAL MEDICINE

## 2018-03-26 PROCEDURE — 85027 COMPLETE CBC AUTOMATED: CPT | Performed by: INTERNAL MEDICINE

## 2018-03-26 PROCEDURE — C1894 INTRO/SHEATH, NON-LASER: HCPCS | Performed by: INTERNAL MEDICINE

## 2018-03-26 PROCEDURE — 63710000001 PREDNISONE PER 1 MG: Performed by: PHYSICIAN ASSISTANT

## 2018-03-26 PROCEDURE — 25010000002 FENTANYL CITRATE (PF) 100 MCG/2ML SOLUTION: Performed by: INTERNAL MEDICINE

## 2018-03-26 PROCEDURE — 80048 BASIC METABOLIC PNL TOTAL CA: CPT | Performed by: INTERNAL MEDICINE

## 2018-03-26 PROCEDURE — 93460 R&L HRT ART/VENTRICLE ANGIO: CPT | Performed by: INTERNAL MEDICINE

## 2018-03-26 PROCEDURE — 25010000002 HEPARIN (PORCINE) PER 1000 UNITS: Performed by: INTERNAL MEDICINE

## 2018-03-26 RX ORDER — MIDAZOLAM HYDROCHLORIDE 1 MG/ML
INJECTION INTRAMUSCULAR; INTRAVENOUS AS NEEDED
Status: DISCONTINUED | OUTPATIENT
Start: 2018-03-26 | End: 2018-03-26 | Stop reason: HOSPADM

## 2018-03-26 RX ORDER — ASPIRIN 325 MG
325 TABLET, DELAYED RELEASE (ENTERIC COATED) ORAL DAILY
Status: DISCONTINUED | OUTPATIENT
Start: 2018-03-26 | End: 2018-03-26 | Stop reason: HOSPADM

## 2018-03-26 RX ORDER — TEMAZEPAM 7.5 MG/1
7.5 CAPSULE ORAL NIGHTLY PRN
Status: DISCONTINUED | OUTPATIENT
Start: 2018-03-26 | End: 2018-03-26 | Stop reason: HOSPADM

## 2018-03-26 RX ORDER — NALOXONE HCL 0.4 MG/ML
0.4 VIAL (ML) INJECTION
Status: DISCONTINUED | OUTPATIENT
Start: 2018-03-26 | End: 2018-03-26 | Stop reason: HOSPADM

## 2018-03-26 RX ORDER — ALPRAZOLAM 0.25 MG/1
0.25 TABLET ORAL 3 TIMES DAILY PRN
Status: DISCONTINUED | OUTPATIENT
Start: 2018-03-26 | End: 2018-03-26 | Stop reason: HOSPADM

## 2018-03-26 RX ORDER — DIPHENHYDRAMINE HCL 50 MG
50 CAPSULE ORAL ONCE
Status: COMPLETED | OUTPATIENT
Start: 2018-03-26 | End: 2018-03-26

## 2018-03-26 RX ORDER — SODIUM CHLORIDE 9 MG/ML
250 INJECTION, SOLUTION INTRAVENOUS CONTINUOUS
Status: ACTIVE | OUTPATIENT
Start: 2018-03-26 | End: 2018-03-26

## 2018-03-26 RX ORDER — HYDROCODONE BITARTRATE AND ACETAMINOPHEN 5; 325 MG/1; MG/1
1 TABLET ORAL EVERY 4 HOURS PRN
Status: DISCONTINUED | OUTPATIENT
Start: 2018-03-26 | End: 2018-03-26 | Stop reason: HOSPADM

## 2018-03-26 RX ORDER — MORPHINE SULFATE 2 MG/ML
1 INJECTION, SOLUTION INTRAMUSCULAR; INTRAVENOUS EVERY 4 HOURS PRN
Status: DISCONTINUED | OUTPATIENT
Start: 2018-03-26 | End: 2018-03-26 | Stop reason: HOSPADM

## 2018-03-26 RX ORDER — PREDNISONE 20 MG/1
50 TABLET ORAL ONCE
Status: COMPLETED | OUTPATIENT
Start: 2018-03-26 | End: 2018-03-26

## 2018-03-26 RX ORDER — LIDOCAINE HYDROCHLORIDE 10 MG/ML
INJECTION, SOLUTION EPIDURAL; INFILTRATION; INTRACAUDAL; PERINEURAL AS NEEDED
Status: DISCONTINUED | OUTPATIENT
Start: 2018-03-26 | End: 2018-03-26 | Stop reason: HOSPADM

## 2018-03-26 RX ORDER — FENTANYL CITRATE 50 UG/ML
INJECTION, SOLUTION INTRAMUSCULAR; INTRAVENOUS AS NEEDED
Status: DISCONTINUED | OUTPATIENT
Start: 2018-03-26 | End: 2018-03-26 | Stop reason: HOSPADM

## 2018-03-26 RX ORDER — ACETAMINOPHEN 325 MG/1
650 TABLET ORAL EVERY 4 HOURS PRN
Status: DISCONTINUED | OUTPATIENT
Start: 2018-03-26 | End: 2018-03-26 | Stop reason: HOSPADM

## 2018-03-26 RX ADMIN — PREDNISONE 50 MG: 20 TABLET ORAL at 11:00

## 2018-03-26 RX ADMIN — DIPHENHYDRAMINE HYDROCHLORIDE 50 MG: 50 CAPSULE ORAL at 11:03

## 2018-03-26 RX ADMIN — ASPIRIN 325 MG: 325 TABLET, DELAYED RELEASE ORAL at 12:00

## 2018-04-03 ENCOUNTER — OFFICE VISIT (OUTPATIENT)
Dept: CARDIOLOGY | Facility: CLINIC | Age: 63
End: 2018-04-03

## 2018-04-03 VITALS
WEIGHT: 251.4 LBS | OXYGEN SATURATION: 98 % | SYSTOLIC BLOOD PRESSURE: 116 MMHG | HEART RATE: 112 BPM | BODY MASS INDEX: 49.36 KG/M2 | HEIGHT: 60 IN | DIASTOLIC BLOOD PRESSURE: 73 MMHG

## 2018-04-03 DIAGNOSIS — I10 ESSENTIAL HYPERTENSION: Primary | ICD-10-CM

## 2018-04-03 DIAGNOSIS — R06.02 SHORTNESS OF BREATH: ICD-10-CM

## 2018-04-03 DIAGNOSIS — E78.2 MIXED HYPERLIPIDEMIA: ICD-10-CM

## 2018-04-03 DIAGNOSIS — R00.0 TACHYCARDIA: ICD-10-CM

## 2018-04-03 PROCEDURE — 99213 OFFICE O/P EST LOW 20 MIN: CPT | Performed by: NURSE PRACTITIONER

## 2018-04-03 NOTE — PROGRESS NOTES
Subjective   Karyna Stewart is a 62 y.o. female     Chief Complaint   Patient presents with   • Hypertension     presents as a follow up       HPI    PROBLEM LIST:     1. Chest pain   1.1 Stress Test 2/22/17 - mild lateral wall ischemia; preserved LVEF; positive study without high risk markers   1.2 LHC 3/26/18 - normal coronary arteries; normal right heart pressures, no pulmonary HTN  2. Shortness of breath   2.1 Echo 2/22/17 - mild LVH; EF 60-65%; DD II; mild MR, TR and TN; PA 20-25  3. Edema  4. Chronic Cough   5. Hypertension   6. Hyperlipidemia   7. Dizziness/ lightheadedness   8. Rheumatoid arthritis, lupus, scleroderma  9. Diabetes; diet controlled   10. IBS   11. GERD    Patient is a 62-year-old female who presents today for follow-up status post left and right heart catheter.  She denies any chest pain, pressure, palpitations, fluttering, dizziness, presyncope, syncope, orthopnea or PND.  She says she will get swelling in her left leg.  She says she gets short of breath if she exerts herself at all.  She says she's had some improvement with her coughing but she still will get choked if she is laying down when she starts coughing.  She also says she's been having some swallowing difficulties and it's been a while since she's had any type of EGD so she will follow-up with gastroenterology for this.  We have been unable to find any type of cardiac cause for her symptoms.    We went over left and right heart catheter.    Current Outpatient Prescriptions   Medication Sig Dispense Refill   • amLODIPine (NORVASC) 10 MG tablet Take 1 tablet by mouth Daily. 30 tablet 11   • aspirin 81 MG chewable tablet Chew 1 tablet Daily. 30 tablet 11   • Belimumab (BENLYSTA IV) Infuse  into a venous catheter. Every 4-6 weeks     • ESTRACE VAGINAL 0.1 MG/GM vaginal cream Applies M/W/F     • fluticasone (FLONASE) 50 MCG/ACT nasal spray      • folic acid (FOLVITE) 1 MG tablet Take 1 mg by mouth Daily.     • furosemide (LASIX)  20 MG tablet Take 1 tablet by mouth Daily As Needed (edema). 30 tablet 11   • gabapentin (NEURONTIN) 100 MG capsule Take 100 mg by mouth 2 (Two) Times a Day. 2 tablets at bedtime     • isosorbide mononitrate (IMDUR) 30 MG 24 hr tablet TAKE 1/2 TABLET DAILY 90 tablet 3   • losartan (COZAAR) 50 MG tablet Take 2 tablets by mouth Daily. 60 tablet 11   • nitroglycerin (NITROSTAT) 0.4 MG SL tablet 1 under the tongue as needed for angina, may repeat q5mins for up three doses 30 tablet 5   • omeprazole (priLOSEC) 20 MG capsule Take 20 mg by mouth 2 (Two) Times a Day.     • potassium chloride (K-DUR,KLOR-CON) 20 MEQ CR tablet Take 1 tablet by mouth Daily. 30 tablet 6   • sucralfate (CARAFATE) 1 g tablet 2 (Two) Times a Day.     • triamterene-hydrochlorothiazide (MAXZIDE-25) 37.5-25 MG per tablet Take 1 tablet by mouth Daily. 30 tablet 11   • trospium (SANCTURA) 20 MG tablet 2 (Two) Times a Day.       No current facility-administered medications for this visit.        ALLERGIES    Diovan [valsartan] and Iodine    Past Medical History:   Diagnosis Date   • Anxiety and depression    • Arthritis    • Chest pain    • Chronic cough    • Diabetes    • Fibromyalgia    • GERD (gastroesophageal reflux disease)    • High triglycerides    • Hypertension    • Irritable bowel syndrome    • Lupus (systemic lupus erythematosus)    • Migraines    • Scleroderma    • Ulcerative colitis        Social History     Social History   • Marital status:      Spouse name: N/A   • Number of children: 0   • Years of education: N/A     Occupational History   •  [employer]     Social History Main Topics   • Smoking status: Former Smoker     Types: Cigarettes     Quit date: 2002   • Smokeless tobacco: Never Used      Comment: quit 15 yrs ago    • Alcohol use No   • Drug use: No   • Sexual activity: Defer     Other Topics Concern   • Not on file     Social History Narrative   • No narrative on file       Family History  "  Problem Relation Age of Onset   • COPD Mother    • Hypertension Mother    • Diverticulitis Father 45   • No Known Problems Son    • No Known Problems Daughter    • Stomach cancer Paternal Grandmother        Review of Systems   Constitutional: Positive for fatigue. Negative for diaphoresis.   HENT: Positive for sinus pressure and trouble swallowing (has had for some time ). Negative for rhinorrhea and sneezing.    Eyes: Positive for visual disturbance (wears glasses).   Respiratory: Positive for cough (has improved some, will get choked if laying down when coughing starts) and shortness of breath (if exerted herself ). Negative for chest tightness.    Cardiovascular: Positive for leg swelling (left leg ). Negative for chest pain and palpitations.   Gastrointestinal: Positive for constipation. Negative for diarrhea, nausea and vomiting.   Endocrine: Negative.    Genitourinary: Negative for difficulty urinating.   Musculoskeletal: Positive for arthralgias, back pain, gait problem (uses a cane ) and neck pain. Negative for myalgias.   Skin: Negative.    Allergic/Immunologic: Positive for environmental allergies.   Neurological: Positive for headaches ( keeps slight migraine ). Negative for dizziness, syncope and light-headedness.   Hematological: Does not bruise/bleed easily.   Psychiatric/Behavioral: The patient is nervous/anxious.        Objective   /73 (BP Location: Left arm, Patient Position: Sitting)   Pulse 112   Ht 152.4 cm (60\")   Wt 114 kg (251 lb 6.4 oz)   SpO2 98%   BMI 49.10 kg/m²   Vitals:    04/03/18 1044 04/03/18 1107   BP: 116/73    BP Location: Left arm    Patient Position: Sitting    Pulse: (!) 127 112   SpO2: 97% 98%   Weight: 114 kg (251 lb 6.4 oz)    Height: 152.4 cm (60\")       Lab Results (most recent)     None        Physical Exam   Constitutional: She is oriented to person, place, and time. Vital signs are normal. She appears well-developed and well-nourished. She is active and " cooperative.   HENT:   Head: Normocephalic.   Eyes: Lids are normal.   Wears glasses    Neck: Normal carotid pulses, no hepatojugular reflux and no JVD present. Carotid bruit is not present.   Cardiovascular: Normal rate, regular rhythm and normal heart sounds.    Pulses:       Radial pulses are 2+ on the right side, and 2+ on the left side.        Dorsalis pedis pulses are 2+ on the right side, and 2+ on the left side.        Posterior tibial pulses are 2+ on the right side, and 2+ on the left side.   No edema BLE.    Pulmonary/Chest: Effort normal and breath sounds normal.   Abdominal: Normal appearance and bowel sounds are normal.   Neurological: She is alert and oriented to person, place, and time.   Skin: Skin is warm, dry and intact.   Psychiatric: She has a normal mood and affect. Her speech is normal and behavior is normal. Judgment and thought content normal. Cognition and memory are normal.       Procedure   Procedures         Assessment/Plan      Diagnosis Plan   1. Essential hypertension     2. Mixed hyperlipidemia     3. Shortness of breath     4. Tachycardia         Return in about 6 months (around 10/3/2018).         Hypertension-patient doing well.  Mixed hyperlipidemia-patient is diet controlled.  Shortness of breath-no change.  Tachycardia-no change.  She will continue her medication regimen.  She will follow-up in 6 months or sooner if any changes.  She was also supposed to have a CT of the chest through [facility] which she is waiting to find out when she can get back in.  It is going to be a while she may call and have us ordered for her so she has it whenever she goes for follow-up.      Discussed the patient's BMI with her. BMI is above normal parameters. Follow-up plan includes:  educational material.      Electronically signed by:

## 2018-04-03 NOTE — PATIENT INSTRUCTIONS
Obesity, Adult  Obesity is the condition of having too much total body fat. Being overweight or obese means that your weight is greater than what is considered healthy for your body size. Obesity is determined by a measurement called BMI. BMI is an estimate of body fat and is calculated from height and weight. For adults, a BMI of 30 or higher is considered obese.  Obesity can eventually lead to other health concerns and major illnesses, including:  · Stroke.  · Coronary artery disease (CAD).  · Type 2 diabetes.  · Some types of cancer, including cancers of the colon, breast, uterus, and gallbladder.  · Osteoarthritis.  · High blood pressure (hypertension).  · High cholesterol.  · Sleep apnea.  · Gallbladder stones.  · Infertility problems.  What are the causes?  The main cause of obesity is taking in (consuming) more calories than your body uses for energy. Other factors that contribute to this condition may include:  · Being born with genes that make you more likely to become obese.  · Having a medical condition that causes obesity. These conditions include:  ¨ Hypothyroidism.  ¨ Polycystic ovarian syndrome (PCOS).  ¨ Binge-eating disorder.  ¨ Cushing syndrome.  · Taking certain medicines, such as steroids, antidepressants, and seizure medicines.  · Not being physically active (sedentary lifestyle).  · Living where there are limited places to exercise safely or buy healthy foods.  · Not getting enough sleep.  What increases the risk?  The following factors may increase your risk of this condition:  · Having a family history of obesity.  · Being a woman of -American descent.  · Being a man of  descent.  What are the signs or symptoms?  Having excessive body fat is the main symptom of this condition.  How is this diagnosed?  This condition may be diagnosed based on:  · Your symptoms.  · Your medical history.  · A physical exam. Your health care provider may measure:  ¨ Your BMI. If you are an adult  with a BMI between 25 and less than 30, you are considered overweight. If you are an adult with a BMI of 30 or higher, you are considered obese.  ¨ The distances around your hips and your waist (circumferences). These may be compared to each other to help diagnose your condition.  ¨ Your skinfold thickness. Your health care provider may gently pinch a fold of your skin and measure it.  How is this treated?  Treatment for this condition often includes changing your lifestyle. Treatment may include some or all of the following:  · Dietary changes. Work with your health care provider and a dietitian to set a weight-loss goal that is healthy and reasonable for you. Dietary changes may include eating:  ¨ Smaller portions. A portion size is the amount of a particular food that is healthy for you to eat at one time. This varies from person to person.  ¨ Low-calorie or low-fat options.  ¨ More whole grains, fruits, and vegetables.  · Regular physical activity. This may include aerobic activity (cardio) and strength training.  · Medicine to help you lose weight. Your health care provider may prescribe medicine if you are unable to lose 1 pound a week after 6 weeks of eating more healthily and doing more physical activity.  · Surgery. Surgical options may include gastric banding and gastric bypass. Surgery may be done if:  ¨ Other treatments have not helped to improve your condition.  ¨ You have a BMI of 40 or higher.  ¨ You have life-threatening health problems related to obesity.  Follow these instructions at home:     Eating and drinking     · Follow recommendations from your health care provider about what you eat and drink. Your health care provider may advise you to:  ¨ Limit fast foods, sweets, and processed snack foods.  ¨ Choose low-fat options, such as low-fat milk instead of whole milk.  ¨ Eat 5 or more servings of fruits or vegetables every day.  ¨ Eat at home more often. This gives you more control over what you  eat.  ¨ Choose healthy foods when you eat out.  ¨ Learn what a healthy portion size is.  ¨ Keep low-fat snacks on hand.  ¨ Avoid sugary drinks, such as soda, fruit juice, iced tea sweetened with sugar, and flavored milk.  ¨ Eat a healthy breakfast.  · Drink enough water to keep your urine clear or pale yellow.  · Do not go without eating for long periods of time (do not fast) or follow a fad diet. Fasting and fad diets can be unhealthy and even dangerous.  Physical Activity   · Exercise regularly, as told by your health care provider. Ask your health care provider what types of exercise are safe for you and how often you should exercise.  · Warm up and stretch before being active.  · Cool down and stretch after being active.  · Rest between periods of activity.  Lifestyle   · Limit the time that you spend in front of your TV, computer, or video game system.  · Find ways to reward yourself that do not involve food.  · Limit alcohol intake to no more than 1 drink a day for nonpregnant women and 2 drinks a day for men. One drink equals 12 oz of beer, 5 oz of wine, or 1½ oz of hard liquor.  General instructions   · Keep a weight loss journal to keep track of the food you eat and how much you exercise you get.  · Take over-the-counter and prescription medicines only as told by your health care provider.  · Take vitamins and supplements only as told by your health care provider.  · Consider joining a support group. Your health care provider may be able to recommend a support group.  · Keep all follow-up visits as told by your health care provider. This is important.  Contact a health care provider if:  · You are unable to meet your weight loss goal after 6 weeks of dietary and lifestyle changes.  This information is not intended to replace advice given to you by your health care provider. Make sure you discuss any questions you have with your health care provider.  Document Released: 01/25/2006 Document Revised:  05/22/2017 Document Reviewed: 10/05/2016  [employer] Interactive Patient Education © 2017 [employer]  MyPlate from [employer]  The general, healthful diet is based on the 2010 Dietary Guidelines for Americans. The amount of food you need to eat from each food group depends on your age, sex, and level of physical activity and can be individualized by a dietitian. Go to ChooseMyPlate.gov for more information.  What do I need to know about the MyPlate plan?  · Enjoy your food, but eat less.  · Avoid oversized portions.  ¨ ½ of your plate should include fruits and vegetables.  ¨ ¼ of your plate should be grains.  ¨ ¼ of your plate should be protein.  Grains   · Make at least half of your grains whole grains.  · For a 2,000 calorie daily food plan, eat 6 oz every day.  · 1 oz is about 1 slice bread, 1 cup cereal, or ½ cup cooked rice, cereal, or pasta.  Vegetables   · Make half your plate fruits and vegetables.  · For a 2,000 calorie daily food plan, eat 2½ cups every day.  · 1 cup is about 1 cup raw or cooked vegetables or vegetable juice or 2 cups raw leafy greens.  Fruits   · Make half your plate fruits and vegetables.  · For a 2,000 calorie daily food plan, eat 2 cups every day.  · 1 cup is about 1 cup fruit or 100% fruit juice or ½ cup dried fruit.  Protein   · For a 2,000 calorie daily food plan, eat 5½ oz every day.  · 1 oz is about 1 oz meat, poultry, or fish, ¼ cup cooked beans, 1 egg, 1 Tbsp peanut butter, or ½ oz nuts or seeds.  Dairy   · Switch to fat-free or low-fat (1%) milk.  · For a 2,000 calorie daily food plan, eat 3 cups every day.  · 1 cup is about 1 cup milk or yogurt or soy milk (soy beverage), 1½ oz natural cheese, or 2 oz processed cheese.  Fats, Oils, and Empty Calories   · Only small amounts of oils are recommended.  · Empty calories are calories from solid fats or added sugars.  · Compare sodium in foods like soup, bread, and frozen meals. Choose the foods with lower numbers.  · Drink water instead  of sugary drinks.  What foods can I eat?  Grains   Whole grains such as whole wheat, quinoa, millet, and bulgur. Bread, rolls, and pasta made from whole grains. Brown or wild rice. Hot or cold cereals made from whole grains and without added sugar.  Vegetables   All fresh vegetables, especially fresh red, dark green, or orange vegetables. Peas and beans. Low-sodium frozen or canned vegetables prepared without added salt. Low-sodium vegetable juices.  Fruits   All fresh, frozen, and dried fruits. Canned fruit packed in water or fruit juice without added sugar. Fruit juices without added sugar.  Meats and Other Protein Sources   Boiled, baked, or grilled lean meat trimmed of fat. Skinless poultry. Fresh seafood and shellfish. Canned seafood packed in water. Unsalted nuts and unsalted nut butters. Tofu. Dried beans and pea. Eggs.  Dairy   Low-fat or fat-free milk, yogurt, and cheeses.  Sweets and Desserts   Frozen desserts made from low-fat milk.  Fats and Oils   Olive, peanut, and canola oils and margarine. Salad dressing and mayonnaise made from these oils.  Other   Soups and casseroles made from allowed ingredients and without added fat or salt.  The items listed above may not be a complete list of recommended foods or beverages. Contact your dietitian for more options.   What foods are not recommended?  Grains   Sweetened, low-fiber cereals. Packaged baked goods. Snack crackers and chips. Cheese crackers, butter crackers, and biscuits. Frozen waffles, sweet breads, doughnuts, pastries, packaged baking mixes, pancakes, cakes, and cookies.  Vegetables   Regular canned or frozen vegetables or vegetables prepared with salt. Canned tomatoes. Canned tomato sauce. Fried vegetables. Vegetables in cream sauce or cheese sauce.  Fruits   Fruits packed in syrup or made with added sugar.  Meats and Other Protein Sources   Marbled or fatty meats such as ribs. Poultry with skin. Fried meats, poultry, eggs, or fish. Sausages, hot  dogs, and deli meats such as pastrami, bologna, or salami.  Dairy   Whole milk, cream, cheeses made from whole milk, sour cream. Ice cream or yogurt made from whole milk or with added sugar.  Beverages   For adults, no more than one alcoholic drink per day. Regular soft drinks or other sugary beverages. Juice drinks.  Sweets and Desserts   Sugary or fatty desserts, candy, and other sweets.  Fats and Oils   Solid shortening or partially hydrogenated oils. Solid margarine. Margarine that contains trans fats. Butter.  The items listed above may not be a complete list of foods and beverages to avoid. Contact your dietitian for more information.   This information is not intended to replace advice given to you by your health care provider. Make sure you discuss any questions you have with your health care provider.  Document Released: 01/06/2009 Document Revised: 05/25/2017 Document Reviewed: 11/26/2014  [employer] Interactive Patient Education © 2017 [employer]

## 2018-10-03 ENCOUNTER — OFFICE VISIT (OUTPATIENT)
Dept: CARDIOLOGY | Facility: CLINIC | Age: 63
End: 2018-10-03

## 2018-10-03 VITALS
WEIGHT: 250.2 LBS | HEIGHT: 60 IN | HEART RATE: 101 BPM | SYSTOLIC BLOOD PRESSURE: 123 MMHG | BODY MASS INDEX: 49.12 KG/M2 | OXYGEN SATURATION: 98 % | DIASTOLIC BLOOD PRESSURE: 77 MMHG

## 2018-10-03 DIAGNOSIS — R00.0 TACHYCARDIA: ICD-10-CM

## 2018-10-03 DIAGNOSIS — E78.2 MIXED HYPERLIPIDEMIA: ICD-10-CM

## 2018-10-03 DIAGNOSIS — R06.02 SHORTNESS OF BREATH: ICD-10-CM

## 2018-10-03 DIAGNOSIS — E87.6 HYPOKALEMIA: ICD-10-CM

## 2018-10-03 DIAGNOSIS — I10 ESSENTIAL HYPERTENSION: Primary | ICD-10-CM

## 2018-10-03 PROCEDURE — 99213 OFFICE O/P EST LOW 20 MIN: CPT | Performed by: NURSE PRACTITIONER

## 2018-10-03 RX ORDER — POTASSIUM CHLORIDE 20 MEQ/1
20 TABLET, EXTENDED RELEASE ORAL DAILY
Qty: 30 TABLET | Refills: 6 | Status: SHIPPED | OUTPATIENT
Start: 2018-10-03 | End: 2019-04-04 | Stop reason: SDUPTHER

## 2018-10-03 NOTE — PROGRESS NOTES
Subjective   Karyna Stewart is a 62 y.o. female     Chief Complaint   Patient presents with   • Follow-up     Pt in office for 6 month follow up appt   • Hypertension   • Chest Pain       HPI    PROBLEM LIST:     1. Chest pain   1.1 Stress Test 2/22/17 - mild lateral wall ischemia; preserved LVEF; positive study without high risk markers   1.2 LHC 3/26/18 - normal coronary arteries; normal right heart pressures, no pulmonary HTN  2. Shortness of breath   2.1 Echo 2/22/17 - mild LVH; EF 60-65%; DD II; mild MR, TR and LA; PA 20-25  3. Edema  4. Chronic Cough   5. Hypertension   6. Hyperlipidemia   7. Dizziness/ lightheadedness   8. Rheumatoid arthritis, lupus, scleroderma  9. Diabetes; diet controlled   10. IBS   11. GERD    Patient is a 62-year-old female who presents today for follow-up.  She denies any chest pain or pressure.  She says her chest does get tight at times.  She denies any palpitations or fluttering.  She does get dizzy/lightheaded she says but only if she stands up too quickly.  She denies any presyncope, syncope, orthopnea or PND.  She does get some swelling in her legs.  She says her left leg hurts more than the right.  She does get short of breath which is constant she always short of breath even with sitting.  PCP monitors her cholesterol.  She says when she gets her infusions once a month or so her blood pressure is low.  Her blood pressure has been good every time that she's been here so she will have them faxed me over there records to see if we need to make any adjustments.    Current Outpatient Prescriptions   Medication Sig Dispense Refill   • amLODIPine (NORVASC) 10 MG tablet Take 1 tablet by mouth Daily. 30 tablet 11   • aspirin 81 MG chewable tablet Chew 1 tablet Daily. 30 tablet 11   • Belimumab (BENLYSTA IV) Infuse  into a venous catheter. Every 4-6 weeks     • ESTRACE VAGINAL 0.1 MG/GM vaginal cream Applies M/W/F     • fluticasone (FLONASE) 50 MCG/ACT nasal spray      • folic acid  (FOLVITE) 1 MG tablet Take 1 mg by mouth Daily.     • furosemide (LASIX) 20 MG tablet Take 1 tablet by mouth Daily As Needed (edema). 30 tablet 11   • isosorbide mononitrate (IMDUR) 30 MG 24 hr tablet TAKE 1/2 TABLET DAILY 90 tablet 3   • losartan (COZAAR) 50 MG tablet Take 2 tablets by mouth Daily. 60 tablet 11   • Mirabegron ER (MYRBETRIQ) 50 MG tablet sustained-release 24 hour 24 hr tablet Take 50 mg by mouth Daily.     • nitroglycerin (NITROSTAT) 0.4 MG SL tablet 1 under the tongue as needed for angina, may repeat q5mins for up three doses 30 tablet 5   • omeprazole (priLOSEC) 20 MG capsule Take 20 mg by mouth 2 (Two) Times a Day.     • potassium chloride (K-DUR,KLOR-CON) 20 MEQ CR tablet Take 1 tablet by mouth Daily. 30 tablet 6   • sucralfate (CARAFATE) 1 g tablet 2 (Two) Times a Day.     • triamterene-hydrochlorothiazide (MAXZIDE-25) 37.5-25 MG per tablet Take 1 tablet by mouth Daily. 30 tablet 11   • trospium (SANCTURA) 20 MG tablet 2 (Two) Times a Day.     • Unable to find 1 each 1 (One) Time. Med Name: Veslcare 5mg  4 weeks       No current facility-administered medications for this visit.        ALLERGIES    Diovan [valsartan] and Iodine    Past Medical History:   Diagnosis Date   • Anxiety and depression    • Arthritis    • Chest pain    • Chronic cough    • Diabetes (CMS/HCC)    • Fibromyalgia    • GERD (gastroesophageal reflux disease)    • High triglycerides    • Hypertension    • Irritable bowel syndrome    • Lupus (systemic lupus erythematosus) (CMS/HCC)    • Migraines    • Scleroderma (CMS/HCC)    • Ulcerative colitis (CMS/HCC)        Social History     Social History   • Marital status:      Spouse name: N/A   • Number of children: 0   • Years of education: N/A     Occupational History   •  [employer]     Social History Main Topics   • Smoking status: Former Smoker     Types: Cigarettes     Quit date: 2002   • Smokeless tobacco: Never Used      Comment: quit 15  "yrs ago    • Alcohol use No   • Drug use: No   • Sexual activity: Defer     Other Topics Concern   • Not on file     Social History Narrative   • No narrative on file       Family History   Problem Relation Age of Onset   • COPD Mother    • Hypertension Mother    • Diverticulitis Father 45   • No Known Problems Son    • No Known Problems Daughter    • Stomach cancer Paternal Grandmother        Review of Systems   Constitutional: Positive for fatigue (Pt states that fatigue is unchanges, but still an issue). Negative for diaphoresis.   HENT: Positive for congestion. Negative for ear pain, rhinorrhea and sore throat.    Eyes: Positive for visual disturbance (Glasses daily).   Respiratory: Positive for cough (Pt states that she still has a cough, like something is in her throat), chest tightness (Pt states \"tightening, squeezing type feeling\") and shortness of breath (Pt states \"it's constant\" ).    Cardiovascular: Positive for leg swelling (Pt c/o bilateral leg swelling, states left leg hurts more). Negative for chest pain and palpitations.   Gastrointestinal: Positive for abdominal pain (\"discomfort\" ) and diarrhea (Pt states that her stool has been soft, but not exactly diarrhea. States that she has this issue most of the week and at the end of the week, no BM at all. ). Negative for nausea and vomiting.   Endocrine: Positive for cold intolerance (Pt states that the cold and the heat make her sx worse. ) and heat intolerance.   Genitourinary: Positive for frequency (Pt states \"it constantly feels like I have to go to the bathroom.\" States a cyst was found, but not severe enough for another surgery. States that she has to go every two hours or so even if she doesn't drink much. ) and urgency. Negative for difficulty urinating, dysuria and hematuria.   Musculoskeletal: Positive for back pain (pt c/o lower back pain, pt states it's been a long term problem). Negative for neck pain.   Skin: Negative for rash and wound. " "  Allergic/Immunologic: Positive for environmental allergies (Seasonal). Negative for food allergies.   Neurological: Positive for dizziness (Pt c/o vertigo), light-headedness (Pt states that if she stands up too fast, she gets light-headed. but states she can't stand up fast anyway. ) and headaches (Pt c/o \"constant migraines\" ). Negative for syncope.   Hematological: Bruises/bleeds easily (Pt states that she bruises easily).   Psychiatric/Behavioral: Positive for sleep disturbance (Pt states that she doesn't sleep.Insomnia x 2 years). Negative for agitation and confusion. The patient is nervous/anxious (Pt states that she gets a little anxious).        Objective   /77 (BP Location: Left arm, Patient Position: Sitting)   Pulse 101   Ht 152.4 cm (60\")   Wt 113 kg (250 lb 3.2 oz)   SpO2 98%   BMI 48.86 kg/m²   Vitals:    10/03/18 1111   BP: 123/77   BP Location: Left arm   Patient Position: Sitting   Pulse: 101   SpO2: 98%   Weight: 113 kg (250 lb 3.2 oz)   Height: 152.4 cm (60\")      Lab Results (most recent)     None        Physical Exam   Constitutional: She is oriented to person, place, and time. Vital signs are normal. She appears well-developed and well-nourished. She is active and cooperative.   HENT:   Head: Normocephalic.   Eyes: Lids are normal.   Wears glasses    Neck: Normal carotid pulses, no hepatojugular reflux and no JVD present. Carotid bruit is not present.   Cardiovascular: Regular rhythm and normal heart sounds.  Tachycardia present.    Pulses:       Radial pulses are 2+ on the right side, and 2+ on the left side.        Dorsalis pedis pulses are 2+ on the right side, and 2+ on the left side.        Posterior tibial pulses are 2+ on the right side, and 2+ on the left side.   No edema BLE.   Pulmonary/Chest: Effort normal and breath sounds normal.   Abdominal: Normal appearance and bowel sounds are normal.   Musculoskeletal:   Uses a cane    Neurological: She is alert and oriented to " person, place, and time.   Skin: Skin is warm, dry and intact.   Psychiatric: She has a normal mood and affect. Her speech is normal and behavior is normal. Judgment and thought content normal. Cognition and memory are normal.       Procedure   Procedures         Assessment/Plan      Diagnosis Plan   1. Essential hypertension     2. Hypokalemia  potassium chloride (K-DUR,KLOR-CON) 20 MEQ CR tablet   3. Mixed hyperlipidemia     4. Tachycardia     5. Shortness of breath         Return in about 6 months (around 4/3/2019).       hypertension-patient doing what very well.  Hypokalemia-refill patient's potassium.  Mixed hyperlipidemia-patient is diet controlled.  Tachycardia-no change.  Shortness of breath-stable.  She will continue her medication regimen.  She'll follow-up in 6 months or sooner if any changes.        Patient's Body mass index is 48.86 kg/m². BMI is above normal parameters. Recommendations include: educational material.      Electronically signed by:

## 2018-10-03 NOTE — PATIENT INSTRUCTIONS
"Fat and Cholesterol Restricted Diet  Getting too much fat and cholesterol in your diet may cause health problems. Following this diet helps keep your fat and cholesterol at normal levels. This can keep you from getting sick.  What types of fat should I choose?  · Choose monosaturated and polyunsaturated fats. These are found in foods such as olive oil, canola oil, flaxseeds, walnuts, almonds, and seeds.  · Eat more omega-3 fats. Good choices include salmon, mackerel, sardines, tuna, flaxseed oil, and ground flaxseeds.  · Limit saturated fats. These are in animal products such as meats, butter, and cream. They can also be in plant products such as palm oil, palm kernel oil, and coconut oil.  · Avoid foods with partially hydrogenated oils in them. These contain trans fats. Examples of foods that have trans fats are stick margarine, some tub margarines, cookies, crackers, and other baked goods.  What general guidelines do I need to follow?  · Check food labels. Look for the words \"trans fat\" and \"saturated fat.\"  · When preparing a meal:  ? Fill half of your plate with vegetables and green salads.  ? Fill one fourth of your plate with whole grains. Look for the word \"whole\" as the first word in the ingredient list.  ? Fill one fourth of your plate with lean protein foods.  · Eat more foods that have fiber, like apples, carrots, beans, peas, and barley.  · Eat more home-cooked foods. Eat less at restaurants and buffets.  · Limit or avoid alcohol.  · Limit foods high in starch and sugar.  · Limit fried foods.  · Cook foods without frying them. Baking, boiling, grilling, and broiling are all great options.  · Lose weight if you are overweight. Losing even a small amount of weight can help your overall health. It can also help prevent diseases such as diabetes and heart disease.  What foods can I eat?  Grains  Whole grains, such as whole wheat or whole grain breads, crackers, cereals, and pasta. Unsweetened oatmeal, " bulgur, barley, quinoa, or brown rice. Corn or whole wheat flour tortillas.  Vegetables  Fresh or frozen vegetables (raw, steamed, roasted, or grilled). Green salads.  Fruits  All fresh, canned (in natural juice), or frozen fruits.  Meat and Other Protein Products  Ground beef (85% or leaner), grass-fed beef, or beef trimmed of fat. Skinless chicken or turkey. Ground chicken or turkey. Pork trimmed of fat. All fish and seafood. Eggs. Dried beans, peas, or lentils. Unsalted nuts or seeds. Unsalted canned or dry beans.  Dairy  Low-fat dairy products, such as skim or 1% milk, 2% or reduced-fat cheeses, low-fat ricotta or cottage cheese, or plain low-fat yogurt.  Fats and Oils  Tub margarines without trans fats. Light or reduced-fat mayonnaise and salad dressings. Avocado. Olive, canola, sesame, or safflower oils. Natural peanut or almond butter (choose ones without added sugar and oil).  The items listed above may not be a complete list of recommended foods or beverages. Contact your dietitian for more options.  What foods are not recommended?  Grains  White bread. White pasta. White rice. Cornbread. Bagels, pastries, and croissants. Crackers that contain trans fat.  Vegetables  White potatoes. Corn. Creamed or fried vegetables. Vegetables in a cheese sauce.  Fruits  Dried fruits. Canned fruit in light or heavy syrup. Fruit juice.  Meat and Other Protein Products  Fatty cuts of meat. Ribs, chicken wings, [place], sausage, bologna, salami, chitterlings, fatback, hot dogs, bratwurst, and packaged luncheon meats. Liver and organ meats.  Dairy  Whole or 2% milk, cream, half-and-half, and cream cheese. Whole milk cheeses. Whole-fat or sweetened yogurt. Full-fat cheeses. Nondairy creamers and whipped toppings. Processed cheese, cheese spreads, or cheese curds.  Sweets and Desserts  Corn syrup, sugars, honey, and molasses. Candy. Jam and jelly. Syrup. Sweetened cereals. Cookies, pies, cakes, donuts, muffins, and ice  cream.  Fats and Oils  Butter, stick margarine, lard, shortening, ghee, or [place] fat. Coconut, palm kernel, or palm oils.  Beverages  Alcohol. Sweetened drinks (such as sodas, lemonade, and fruit drinks or punches).  The items listed above may not be a complete list of foods and beverages to avoid. Contact your dietitian for more information.  This information is not intended to replace advice given to you by your health care provider. Make sure you discuss any questions you have with your health care provider.  Document Released: 06/18/2013 Document Revised: 08/24/2017 Document Reviewed: 03/19/2015  [employer] Interactive Patient Education © 2018 [employer]  BMI for Adults  Body mass index (BMI) is a number that is calculated from a person's weight and height. In most adults, the number is used to find how much of an adult's weight is made up of fat. BMI is not as accurate as a direct measure of body fat.  How is BMI calculated?  BMI is calculated by dividing weight in kilograms by height in meters squared. It can also be calculated by dividing weight in pounds by height in inches squared, then multiplying the resulting number by 703. Charts are available to help you find your BMI quickly and easily without doing this calculation.  How is BMI interpreted?  Health care professionals use BMI charts to identify whether an adult is underweight, at a normal weight, or overweight based on the following guidelines:  · Underweight: BMI less than 18.5.  · Normal weight: BMI between 18.5 and 24.9.  · Overweight: BMI between 25 and 29.9.  · Obese: BMI of 30 and above.    BMI is usually interpreted the same for males and females.  Weight includes both fat and muscle, so someone with a muscular build, such as an athlete, may have a BMI that is higher than 24.9. In cases like these, BMI may not accurately depict body fat. To determine if excess body fat is the cause of a BMI of 25 or higher, further assessments may need to be  done by a health care provider.  Why is BMI a useful tool?  BMI is used to identify a possible weight problem that may be related to a medical problem or may increase the risk for medical problems. BMI can also be used to promote changes to reach a healthy weight.  This information is not intended to replace advice given to you by your health care provider. Make sure you discuss any questions you have with your health care provider.  Document Released: 08/29/2005 Document Revised: 04/27/2017 Document Reviewed: 05/15/2015  [employer] Interactive Patient Education © 2018 [employer]    MyPlate from [employer]  The general, healthful diet is based on the 2010 Dietary Guidelines for Americans. The amount of food you need to eat from each food group depends on your age, sex, and level of physical activity and can be individualized by a dietitian. Go to ChooseMyPlate.gov for more information.  What do I need to know about the MyPlate plan?  · Enjoy your food, but eat less.  · Avoid oversized portions.  ? ½ of your plate should include fruits and vegetables.  ? ¼ of your plate should be grains.  ? ¼ of your plate should be protein.  Grains  · Make at least half of your grains whole grains.  · For a 2,000 calorie daily food plan, eat 6 oz every day.  · 1 oz is about 1 slice bread, 1 cup cereal, or ½ cup cooked rice, cereal, or pasta.  Vegetables  · Make half your plate fruits and vegetables.  · For a 2,000 calorie daily food plan, eat 2½ cups every day.  · 1 cup is about 1 cup raw or cooked vegetables or vegetable juice or 2 cups raw leafy greens.  Fruits  · Make half your plate fruits and vegetables.  · For a 2,000 calorie daily food plan, eat 2 cups every day.  · 1 cup is about 1 cup fruit or 100% fruit juice or ½ cup dried fruit.  Protein  · For a 2,000 calorie daily food plan, eat 5½ oz every day.  · 1 oz is about 1 oz meat, poultry, or fish, ¼ cup cooked beans, 1 egg, 1 Tbsp peanut butter, or ½ oz nuts or  seeds.  Dairy  · Switch to fat-free or low-fat (1%) milk.  · For a 2,000 calorie daily food plan, eat 3 cups every day.  · 1 cup is about 1 cup milk or yogurt or soy milk (soy beverage), 1½ oz natural cheese, or 2 oz processed cheese.  Fats, Oils, and Empty Calories  · Only small amounts of oils are recommended.  · Empty calories are calories from solid fats or added sugars.  · Compare sodium in foods like soup, bread, and frozen meals. Choose the foods with lower numbers.  · Drink water instead of sugary drinks.  What foods can I eat?  Grains  Whole grains such as whole wheat, quinoa, millet, and bulgur. Bread, rolls, and pasta made from whole grains. Brown or wild rice. Hot or cold cereals made from whole grains and without added sugar.  Vegetables  All fresh vegetables, especially fresh red, dark green, or orange vegetables. Peas and beans. Low-sodium frozen or canned vegetables prepared without added salt. Low-sodium vegetable juices.  Fruits  All fresh, frozen, and dried fruits. Canned fruit packed in water or fruit juice without added sugar. Fruit juices without added sugar.  Meats and Other Protein Sources  Boiled, baked, or grilled lean meat trimmed of fat. Skinless poultry. Fresh seafood and shellfish. Canned seafood packed in water. Unsalted nuts and unsalted nut butters. Tofu. Dried beans and pea. Eggs.  Dairy  Low-fat or fat-free milk, yogurt, and cheeses.  Sweets and Desserts  Frozen desserts made from low-fat milk.  Fats and Oils  Olive, peanut, and canola oils and margarine. Salad dressing and mayonnaise made from these oils.  Other  Soups and casseroles made from allowed ingredients and without added fat or salt.  The items listed above may not be a complete list of recommended foods or beverages. Contact your dietitian for more options.  What foods are not recommended?  Grains  Sweetened, low-fiber cereals. Packaged baked goods. Snack crackers and chips. Cheese crackers, butter crackers, and  biscuits. Frozen waffles, sweet breads, doughnuts, pastries, packaged baking mixes, pancakes, cakes, and cookies.  Vegetables  Regular canned or frozen vegetables or vegetables prepared with salt. Canned tomatoes. Canned tomato sauce. Fried vegetables. Vegetables in cream sauce or cheese sauce.  Fruits  Fruits packed in syrup or made with added sugar.  Meats and Other Protein Sources  Marbled or fatty meats such as ribs. Poultry with skin. Fried meats, poultry, eggs, or fish. Sausages, hot dogs, and deli meats such as pastrami, bologna, or salami.  Dairy  Whole milk, cream, cheeses made from whole milk, sour cream. Ice cream or yogurt made from whole milk or with added sugar.  Beverages  For adults, no more than one alcoholic drink per day. Regular soft drinks or other sugary beverages. Juice drinks.  Sweets and Desserts  Sugary or fatty desserts, candy, and other sweets.  Fats and Oils  Solid shortening or partially hydrogenated oils. Solid margarine. Margarine that contains trans fats. Butter.  The items listed above may not be a complete list of foods and beverages to avoid. Contact your dietitian for more information.  This information is not intended to replace advice given to you by your health care provider. Make sure you discuss any questions you have with your health care provider.  Document Released: 01/06/2009 Document Revised: 05/25/2017 Document Reviewed: 11/26/2014  [employer] Interactive Patient Education © 2018 [employer]

## 2019-04-04 ENCOUNTER — OFFICE VISIT (OUTPATIENT)
Dept: CARDIOLOGY | Facility: CLINIC | Age: 64
End: 2019-04-04

## 2019-04-04 VITALS
SYSTOLIC BLOOD PRESSURE: 130 MMHG | WEIGHT: 248 LBS | DIASTOLIC BLOOD PRESSURE: 77 MMHG | OXYGEN SATURATION: 98 % | HEIGHT: 60 IN | BODY MASS INDEX: 48.69 KG/M2 | HEART RATE: 97 BPM

## 2019-04-04 DIAGNOSIS — R00.0 TACHYCARDIA: ICD-10-CM

## 2019-04-04 DIAGNOSIS — E87.6 HYPOKALEMIA: ICD-10-CM

## 2019-04-04 DIAGNOSIS — R06.02 SHORTNESS OF BREATH: ICD-10-CM

## 2019-04-04 DIAGNOSIS — R60.9 PERIPHERAL EDEMA: ICD-10-CM

## 2019-04-04 DIAGNOSIS — R22.1 LUMP ON NECK: ICD-10-CM

## 2019-04-04 DIAGNOSIS — R94.39 ABNORMAL STRESS TEST: ICD-10-CM

## 2019-04-04 DIAGNOSIS — I10 ESSENTIAL HYPERTENSION: Primary | ICD-10-CM

## 2019-04-04 DIAGNOSIS — E78.2 MIXED HYPERLIPIDEMIA: ICD-10-CM

## 2019-04-04 PROCEDURE — 93000 ELECTROCARDIOGRAM COMPLETE: CPT | Performed by: NURSE PRACTITIONER

## 2019-04-04 PROCEDURE — 99214 OFFICE O/P EST MOD 30 MIN: CPT | Performed by: NURSE PRACTITIONER

## 2019-04-04 RX ORDER — ISOSORBIDE MONONITRATE 30 MG/1
TABLET, EXTENDED RELEASE ORAL
Qty: 90 TABLET | Refills: 3 | Status: SHIPPED | OUTPATIENT
Start: 2019-04-04 | End: 2020-05-20

## 2019-04-04 RX ORDER — TRIAMTERENE AND HYDROCHLOROTHIAZIDE 37.5; 25 MG/1; MG/1
1 CAPSULE ORAL EVERY MORNING
COMMUNITY

## 2019-04-04 RX ORDER — DEXLANSOPRAZOLE 60 MG/1
60 CAPSULE, DELAYED RELEASE ORAL DAILY
COMMUNITY
End: 2020-04-14

## 2019-04-04 RX ORDER — MELOXICAM 15 MG/1
15 TABLET ORAL DAILY
COMMUNITY

## 2019-04-04 RX ORDER — LOSARTAN POTASSIUM 50 MG/1
100 TABLET ORAL DAILY
Qty: 180 TABLET | Refills: 3 | Status: SHIPPED | OUTPATIENT
Start: 2019-04-04 | End: 2020-04-14 | Stop reason: SDUPTHER

## 2019-04-04 RX ORDER — POTASSIUM CHLORIDE 20 MEQ/1
20 TABLET, EXTENDED RELEASE ORAL DAILY
Qty: 90 TABLET | Refills: 3 | Status: SHIPPED | OUTPATIENT
Start: 2019-04-04 | End: 2019-10-14 | Stop reason: SDUPTHER

## 2019-04-04 RX ORDER — FUROSEMIDE 20 MG/1
20 TABLET ORAL DAILY PRN
Qty: 90 TABLET | Refills: 3 | Status: SHIPPED | OUTPATIENT
Start: 2019-04-04 | End: 2020-05-29

## 2019-04-04 RX ORDER — DULOXETIN HYDROCHLORIDE 30 MG/1
30 CAPSULE, DELAYED RELEASE ORAL DAILY
COMMUNITY

## 2019-04-04 NOTE — PATIENT INSTRUCTIONS
"Fat and Cholesterol Restricted Eating Plan  Getting too much fat and cholesterol in your diet may cause health problems. Choosing the right foods helps keep your fat and cholesterol at normal levels. This can keep you from getting certain diseases.  Your doctor may recommend an eating plan that includes:  · Total fat: ______% or less of total calories a day.  · Saturated fat: ______% or less of total calories a day.  · Cholesterol: less than _________mg a day.  · Fiber: ______g a day.    What are tips for following this plan?  General tips  · Work with your doctor to lose weight if you need to.  · Avoid:  ? Foods with added sugar.  ? Fried foods.  ? Foods with partially hydrogenated oils.  · Limit alcohol intake to no more than 1 drink a day for nonpregnant women and 2 drinks a day for men. One drink equals 12 oz of beer, 5 oz of wine, or 1½ oz of hard liquor.  Reading food labels  · Check food labels for:  ? Trans fats.  ? Partially hydrogenated oils.  ? Saturated fat (g) in each serving.  ? Cholesterol (mg) in each serving.  ? Fiber (g) in each serving.  · Choose foods with healthy fats, such as:  ? Monounsaturated fats.  ? Polyunsaturated fats.  ? Omega-3 fats.  · Choose grain products that have whole grains. Look for the word \"whole\" as the first word in the ingredient list.  Cooking  · Cook foods using low-fat methods. These include baking, boiling, grilling, and broiling.  · Eat more home-cooked foods. Eat at restaurants and buffets less often.  · Avoid cooking using saturated fats, such as butter, cream, palm oil, palm kernel oil, and coconut oil.  Meal planning  · At meals, divide your plate into four equal parts:  ? Fill one-half of your plate with vegetables and green salads.  ? Fill one-fourth of your plate with whole grains.  ? Fill one-fourth of your plate with low-fat (lean) protein foods.  · Eat fish that is high in omega-3 fats at least two times a week. This includes mackerel, tuna, sardines, and " salmon.  · Eat foods that are high in fiber, such as whole grains, beans, apples, broccoli, carrots, peas, and barley.  Recommended foods  Grains  · Whole grains, such as whole wheat or whole grain breads, crackers, cereals, and pasta. Unsweetened oatmeal, bulgur, barley, quinoa, or brown rice. Corn or whole wheat flour tortillas.  Vegetables  · Fresh or frozen vegetables (raw, steamed, roasted, or grilled). Green salads.  Fruits  · All fresh, canned (in natural juice), or frozen fruits.  Meats and other protein foods  · Ground beef (85% or leaner), grass-fed beef, or beef trimmed of fat. Skinless chicken or turkey. Ground chicken or turkey. Pork trimmed of fat. All fish and seafood. Egg whites. Dried beans, peas, or lentils. Unsalted nuts or seeds. Unsalted canned beans. Nut butters without added sugar or oil.  Dairy  · Low-fat or nonfat dairy products, such as skim or 1% milk, 2% or reduced-fat cheeses, low-fat and fat-free ricotta or cottage cheese, or plain low-fat and nonfat yogurt.  Fats and oils  · Tub margarine without trans fats. Light or reduced-fat mayonnaise and salad dressings. Avocado. Olive, canola, sesame, or safflower oils.  The items listed above may not be a complete list of recommended foods or beverages. Contact your dietitian for more options.  Foods to avoid  Grains  · White bread. White pasta. White rice. Cornbread. Bagels, pastries, and croissants. Crackers and snack foods that contain trans fat and hydrogenated oils.  Vegetables  · Vegetables cooked in cheese, cream, or butter sauce. Fried vegetables.  Fruits  · Canned fruit in heavy syrup. Fruit in cream or butter sauce. Fried fruit.  Meats and other protein foods  · Fatty cuts of meat. Ribs, chicken wings, [place], sausage, bologna, salami, chitterlings, fatback, hot dogs, bratwurst, and packaged lunch meats. Liver and organ meats. Whole eggs and egg yolks. Chicken and turkey with skin. Fried meat.  Dairy  · Whole or 2% milk, cream,  half-and-half, and cream cheese. Whole milk cheeses. Whole-fat or sweetened yogurt. Full-fat cheeses. Nondairy creamers and whipped toppings. Processed cheese, cheese spreads, and cheese curds.  Beverages  · Alcohol. Sugar-sweetened drinks such as sodas, lemonade, and fruit drinks.  Fats and oils  · Butter, stick margarine, lard, shortening, ghee, or [place] fat. Coconut, palm kernel, and palm oils.  Sweets and desserts  · Corn syrup, sugars, honey, and molasses. Candy. Jam and jelly. Syrup. Sweetened cereals. Cookies, pies, cakes, donuts, muffins, and ice cream.  The items listed above may not be a complete list of foods and beverages to avoid. Contact your dietitian for more information.  Summary  · Choosing the right foods helps keep your fat and cholesterol at normal levels. This can keep you from getting certain diseases.  · At meals, fill one-half of your plate with vegetables and green salads.  · Eat high-fiber foods, like whole grains, beans, apples, carrots, peas, and barley.  · Limit added sugar, saturated fats, alcohol, and fried foods.  This information is not intended to replace advice given to you by your health care provider. Make sure you discuss any questions you have with your health care provider.  Document Released: 06/18/2013 Document Revised: 09/04/2018 Document Reviewed: 09/04/2018  [employer] Interactive Patient Education © 2019 [employer]  BMI for Adults  Body mass index (BMI) is a number that is calculated from a person's weight and height. In most adults, the number is used to find how much of an adult's weight is made up of fat. BMI is not as accurate as a direct measure of body fat.  How is BMI calculated?  BMI is calculated by dividing weight in kilograms by height in meters squared. It can also be calculated by dividing weight in pounds by height in inches squared, then multiplying the resulting number by 703. Charts are available to help you find your BMI quickly and easily without  doing this calculation.  How is BMI interpreted?  Health care professionals use BMI charts to identify whether an adult is underweight, at a normal weight, or overweight based on the following guidelines:  · Underweight: BMI less than 18.5.  · Normal weight: BMI between 18.5 and 24.9.  · Overweight: BMI between 25 and 29.9.  · Obese: BMI of 30 and above.    BMI is usually interpreted the same for males and females.  Weight includes both fat and muscle, so someone with a muscular build, such as an athlete, may have a BMI that is higher than 24.9. In cases like these, BMI may not accurately depict body fat. To determine if excess body fat is the cause of a BMI of 25 or higher, further assessments may need to be done by a health care provider.  Why is BMI a useful tool?  BMI is used to identify a possible weight problem that may be related to a medical problem or may increase the risk for medical problems. BMI can also be used to promote changes to reach a healthy weight.  This information is not intended to replace advice given to you by your health care provider. Make sure you discuss any questions you have with your health care provider.  Document Released: 08/29/2005 Document Revised: 04/27/2017 Document Reviewed: 05/15/2015  [employer] Interactive Patient Education © 2018 [employer]    Sinus Tachycardia  Sinus tachycardia is a kind of fast heartbeat. In sinus tachycardia, the heart beats more than 100 times a minute. Sinus tachycardia starts in a part of the heart called the sinus node. Sinus tachycardia may be harmless, or it may be a sign of a serious condition.  What are the causes?  This condition may be caused by:  · Exercise or exertion.  · A fever.  · Pain.  · Loss of body fluids (dehydration).  · Severe bleeding (hemorrhage).  · Anxiety and stress.  · Certain substances, including:  ? Alcohol.  ? Caffeine.  ? Tobacco and nicotine products.  ? Diet pills.  ? Illegal drugs.  · Medical conditions  including:  ? Heart disease.  ? An infection.  ? An overactive thyroid (hyperthyroidism).  ? A lack of red blood cells (anemia).    What are the signs or symptoms?  Symptoms of this condition include:  · A feeling that the heart is beating quickly (palpitations).  · Suddenly noticing your heartbeat (cardiac awareness).  · Dizziness.  · Tiredness (fatigue).  · Shortness of breath.  · Chest pain.  · Nausea.  · Fainting.    How is this diagnosed?  This condition is diagnosed with:  · A physical exam.  · Other tests, such as:  ? Blood tests.  ? An electrocardiogram (ECG). This test measures the electrical activity of the heart.  ? Holter monitoring. For this test, you wear a device that records your heartbeat for one or more days.    You may be referred to a heart specialist (cardiologist).  How is this treated?  Treatment for this condition depends on the cause or underlying condition. Treatment may involve:  · Treating the underlying condition.  · Taking new medicines or changing your current medicines as told by your health care provider.  · Making changes to your diet or lifestyle.  · Practicing relaxation methods.    Follow these instructions at home:  Lifestyle  · Do not use any products that contain nicotine or tobacco, such as cigarettes and e-cigarettes. If you need help quitting, ask your health care provider.  · Learn relaxation methods, like deep breathing, to help you when you get stressed or anxious.  · Do not use illegal drugs, such as cocaine.  · Do not abuse alcohol. Limit alcohol intake to no more than 1 drink a day for non-pregnant women and 2 drinks a day for men. One drink equals 12 oz of beer, 5 oz of wine, or 1½ oz of hard liquor.  · Find time to rest and relax often. This reduces stress.  · Avoid:  ? Caffeine.  ? Stimulants such as over-the-counter diet pills or pills that help you to stay awake.  ? Situations that cause anxiety or stress.  General instructions  · Drink enough fluids to keep your  urine clear or pale yellow.  · Take over-the-counter and prescription medicines only as told by your health care provider.  · Keep all follow-up visits as told by your health care provider. This is important.  Contact a health care provider if:  · You have a fever.  · You have vomiting or diarrhea that keeps happening (is persistent).  Get help right away if:  · You have pain in your chest, upper arms, jaw, or neck.  · You become weak or dizzy.  · You feel faint.  · You have palpitations that do not go away.  This information is not intended to replace advice given to you by your health care provider. Make sure you discuss any questions you have with your health care provider.  Document Released: 01/25/2006 Document Revised: 07/15/2017 Document Reviewed: 07/01/2016  [employer] Interactive Patient Education © 2019 [employer]

## 2019-04-04 NOTE — PROGRESS NOTES
Subjective   Karyna Stewart is a 63 y.o. female     Chief Complaint   Patient presents with   • Follow-up     Here for a 6 month follow up        HPI    PROBLEM LIST:     1. Chest pain   1.1 Stress Test 2/22/17 - mild lateral wall ischemia; preserved LVEF; positive study without high risk markers   1.2 LHC 3/26/18 - normal coronary arteries; normal right heart pressures, no pulmonary HTN  2. Shortness of breath   2.1 Echo 2/22/17 - mild LVH; EF 60-65%; DD II; mild MR, TR and MI; PA 20-25  3. Edema  4. Chronic Cough   5. Hypertension   6. Hyperlipidemia   7. Dizziness/ lightheadedness   8. Rheumatoid arthritis, lupus, scleroderma  9. Diabetes; diet controlled   10. IBS   11. GERD    Patient is a 63-year-old female who presents today for a follow-up.  She continues to have chest pain as a heaviness that sometimes goes from the front of the chest and sometimes it comes from between her shoulder blades.  She says they can just happen at any time.  She says her heart flutters mostly when she is stressed and she says the states she is stressed all the time.  He says she does have dizziness because she has a history of vertigo.  She does get lightheaded at rest as well.  She denies any presyncope or syncope.  She denies any orthopnea or PND.  She says she does get swelling that does not resolve overnight but she does take her water pill daily.  She says she is short of breath and fatigued all the time.  They can never find out what her cough was from but she says it has actually finally progressively gotten somewhat better even though she still has it.  Patient complains of a lump on the left side of her neck near her throat.  It does feel somewhat different than the right side.  She says it is painful to touch.    Current Outpatient Medications   Medication Sig Dispense Refill   • amLODIPine (NORVASC) 10 MG tablet Take 1 tablet by mouth Daily. 30 tablet 11   • aspirin 81 MG chewable tablet Chew 1 tablet Daily. 30  tablet 11   • dexlansoprazole (DEXILANT) 60 MG capsule Take 60 mg by mouth Daily.     • DULoxetine (CYMBALTA) 30 MG capsule Take 30 mg by mouth Daily.     • folic acid (FOLVITE) 1 MG tablet Take 1 mg by mouth Daily.     • furosemide (LASIX) 20 MG tablet Take 1 tablet by mouth Daily As Needed (edema). 90 tablet 3   • isosorbide mononitrate (IMDUR) 30 MG 24 hr tablet TAKE 1/2 TABLET DAILY 90 tablet 3   • losartan (COZAAR) 50 MG tablet Take 2 tablets by mouth Daily. 180 tablet 3   • meloxicam (MOBIC) 15 MG tablet Take 15 mg by mouth Daily.     • nitroglycerin (NITROSTAT) 0.4 MG SL tablet 1 under the tongue as needed for angina, may repeat q5mins for up three doses 30 tablet 5   • potassium chloride (K-DUR,KLOR-CON) 20 MEQ CR tablet Take 1 tablet by mouth Daily. 90 tablet 3   • RiTUXimab (RITUXAN) 10 MG/ML solution injection Infuse 1,000 mg into a venous catheter. Every 3 months     • sucralfate (CARAFATE) 1 g tablet 2 (Two) Times a Day.     • triamterene-hydrochlorothiazide (DYAZIDE) 37.5-25 MG per capsule Take 1 capsule by mouth Every Morning.       No current facility-administered medications for this visit.        ALLERGIES    Diovan [valsartan] and Iodine    Past Medical History:   Diagnosis Date   • Anxiety and depression    • Arthritis    • Chest pain    • Chronic cough    • Diabetes (CMS/HCC)    • Fibromyalgia    • GERD (gastroesophageal reflux disease)    • High triglycerides    • Hypertension    • Irritable bowel syndrome    • Lupus (systemic lupus erythematosus) (CMS/HCC)    • Migraines    • Scleroderma (CMS/HCC)    • Ulcerative colitis (CMS/HCC)        Social History     Socioeconomic History   • Marital status:      Spouse name: Not on file   • Number of children: 0   • Years of education: Not on file   • Highest education level: Not on file   Occupational History   • Occupation:      Employer: [employer]   Tobacco Use   • Smoking status: Former Smoker     Types:  Cigarettes     Last attempt to quit:      Years since quittin.2   • Smokeless tobacco: Never Used   • Tobacco comment: quit 15 yrs ago    Substance and Sexual Activity   • Alcohol use: No   • Drug use: No   • Sexual activity: Defer       Family History   Problem Relation Age of Onset   • COPD Mother    • Hypertension Mother    • Diverticulitis Father 45   • No Known Problems Son    • No Known Problems Daughter    • Stomach cancer Paternal Grandmother        Review of Systems   Constitutional: Positive for fatigue (Increased fatigue ) and fever (Low grade fever most days ). Negative for chills.   HENT: Positive for postnasal drip and trouble swallowing (Feels like she has a lump in her throat ). Negative for congestion, rhinorrhea and sore throat.    Eyes: Positive for visual disturbance (Glasses daily ).   Respiratory: Positive for cough (Dry, irritating cough ) and shortness of breath. Negative for chest tightness.    Cardiovascular: Positive for chest pain (Episodes of chest pain which radiate into her back; HEAVINESS, SOMETIMES COMES FROM BACK), palpitations (Flutters; UNDER STRESS MOST DAYS SO NOTICE MORE ) and leg swelling (LE edema which does not resolve overnight; TAKES WATER PILL DAILY ).   Gastrointestinal: Positive for abdominal pain (Has been changed to Dexilant), constipation (Fluctuates between constipation and diarrhea ), diarrhea and nausea (ALMOST ALWAYS ). Negative for vomiting.   Endocrine: Positive for cold intolerance (Fluctuates ) and heat intolerance.   Genitourinary: Positive for frequency. Negative for dysuria and urgency.   Musculoskeletal: Positive for arthralgias (Aches all over, rheumatoid arthritis ), back pain (Mid back pain with chest pain ) and gait problem (Uses a straight cane ).   Skin: Positive for rash (on face and shins ). Negative for wound.   Allergic/Immunologic: Positive for environmental allergies (Seasonal ). Negative for food allergies.   Neurological: Positive  "for dizziness (Vertigo ), weakness (some weakness in legs ) and light-headedness (At rest).   Hematological: Bruises/bleeds easily (Bruises ).   Psychiatric/Behavioral: Negative.  Negative for sleep disturbance (Denies waking with smothering or SOA).       Objective   /77 (BP Location: Left arm, Patient Position: Sitting)   Pulse 97 Comment: EKG  Ht 152.4 cm (60\")   Wt 112 kg (248 lb)   SpO2 98%   BMI 48.43 kg/m²   Vitals:    04/04/19 0941 04/04/19 1013   BP: 130/77    BP Location: Left arm    Patient Position: Sitting    Pulse: (!) 122 97   SpO2: 98%    Weight: 112 kg (248 lb)    Height: 152.4 cm (60\")       Lab Results (most recent)     None        Physical Exam   Constitutional: She is oriented to person, place, and time. Vital signs are normal. She appears well-developed and well-nourished. She is active and cooperative.   HENT:   Head: Normocephalic.   Eyes: Lids are normal.   WEARS GLASSES   Neck: Normal carotid pulses, no hepatojugular reflux and no JVD present. Carotid bruit is not present.   LEFT SIDE OF NECK LUMP SORE   Cardiovascular: Regular rhythm and normal heart sounds. Tachycardia present.   Pulses:       Radial pulses are 2+ on the right side, and 2+ on the left side.        Dorsalis pedis pulses are 2+ on the right side, and 2+ on the left side.        Posterior tibial pulses are 2+ on the right side, and 2+ on the left side.   NO EDEMA BLE.   Pulmonary/Chest: Effort normal and breath sounds normal.   Abdominal: Normal appearance and bowel sounds are normal.   Neurological: She is alert and oriented to person, place, and time.   Skin: Skin is warm, dry and intact.   Psychiatric: She has a normal mood and affect. Her speech is normal and behavior is normal. Judgment and thought content normal. Cognition and memory are normal.       Procedure     ECG 12 Lead  Date/Time: 4/4/2019 11:51 AM  Performed by: Dinora Pitts APRN  Authorized by: Dinora Pitts APRN   Comparison: compared with " previous ECG from 11/28/2017  Rhythm: sinus rhythm  Rate: normal  BPM: 97  QRS axis: normal  Other findings: T wave abnormality    Clinical impression: non-specific ECG                 Assessment/Plan      Diagnosis Plan   1. Essential hypertension  losartan (COZAAR) 50 MG tablet   2. Mixed hyperlipidemia     3. Tachycardia     4. Shortness of breath  Pulse Oximetry Device   5. Peripheral edema  furosemide (LASIX) 20 MG tablet   6. Abnormal stress test  isosorbide mononitrate (IMDUR) 30 MG 24 hr tablet   7. Hypokalemia  potassium chloride (K-DUR,KLOR-CON) 20 MEQ CR tablet   8. Lump on neck  US head neck soft tissue       Return in about 6 months (around 10/4/2019).    Hypertension-patient is on amlodipine, losartan and Dyazide.  Hyperlipidemia-patient is diet controlled.  Tachycardia-stable.  Shortness of breath-patient will wear overnight pulse ox to see if she potentially has the need for any oxygen.  Peripheral edema-patient is doing very well on her Lasix as well as her Dyazide.  Lump on neck-patient will have ultrasound of soft tissue of the neck.  We will call her with results as we receive them.  Patient will continue her medication regimen.  She will follow-up in 6 months or sooner if any changes.       Patient's Body mass index is 48.43 kg/m². BMI is above normal parameters. Recommendations include: educational material and referral to primary care.      Electronically signed by:

## 2019-04-18 ENCOUNTER — TELEPHONE (OUTPATIENT)
Dept: CARDIOLOGY | Facility: CLINIC | Age: 64
End: 2019-04-18

## 2019-04-18 NOTE — TELEPHONE ENCOUNTER
----- Message from KALA August sent at 4/18/2019 10:01 AM EDT -----  Advise patient, forward to Dr. Hameed and meg that they make her a follow-up appt

## 2019-04-18 NOTE — TELEPHONE ENCOUNTER
Overnight pulse ox shows that she qualifies for overnight oxygen, oxygen dropped to a low of 80%.     US head and neck didn't show any clots.       Informed pt of the above and routed results and this message to Dr. Hameed.

## 2019-10-14 ENCOUNTER — OFFICE VISIT (OUTPATIENT)
Dept: CARDIOLOGY | Facility: CLINIC | Age: 64
End: 2019-10-14

## 2019-10-14 VITALS
HEIGHT: 60 IN | DIASTOLIC BLOOD PRESSURE: 84 MMHG | SYSTOLIC BLOOD PRESSURE: 145 MMHG | HEART RATE: 116 BPM | OXYGEN SATURATION: 98 % | BODY MASS INDEX: 51.2 KG/M2 | WEIGHT: 260.8 LBS

## 2019-10-14 DIAGNOSIS — R60.9 PERIPHERAL EDEMA: ICD-10-CM

## 2019-10-14 DIAGNOSIS — R06.02 SHORTNESS OF BREATH: ICD-10-CM

## 2019-10-14 DIAGNOSIS — E87.6 HYPOKALEMIA: ICD-10-CM

## 2019-10-14 DIAGNOSIS — I10 ESSENTIAL HYPERTENSION: Primary | ICD-10-CM

## 2019-10-14 DIAGNOSIS — E78.2 MIXED HYPERLIPIDEMIA: ICD-10-CM

## 2019-10-14 PROCEDURE — 99213 OFFICE O/P EST LOW 20 MIN: CPT | Performed by: NURSE PRACTITIONER

## 2019-10-14 RX ORDER — POTASSIUM CHLORIDE 20 MEQ/1
20 TABLET, EXTENDED RELEASE ORAL DAILY
Qty: 90 TABLET | Refills: 3 | Status: SHIPPED | OUTPATIENT
Start: 2019-10-14 | End: 2020-09-23 | Stop reason: SDUPTHER

## 2019-10-14 RX ORDER — AZELASTINE 1 MG/ML
2 SPRAY, METERED NASAL 2 TIMES DAILY
COMMUNITY
End: 2020-09-23

## 2019-10-14 RX ORDER — MONTELUKAST SODIUM 10 MG/1
10 TABLET ORAL NIGHTLY
COMMUNITY

## 2019-10-14 RX ORDER — TROSPIUM CHLORIDE ER 60 MG/1
CAPSULE ORAL EVERY MORNING
COMMUNITY
End: 2020-09-23

## 2019-10-14 RX ORDER — LEVOCETIRIZINE DIHYDROCHLORIDE 5 MG/1
5 TABLET, FILM COATED ORAL EVERY EVENING
COMMUNITY

## 2019-10-14 NOTE — PROGRESS NOTES
Subjective   Karyna Stewart is a 63 y.o. female     Chief Complaint   Patient presents with   • Follow-up   • Hypertension       HPI    PROBLEM LIST:     1. Chest pain   1.1 Stress Test 2/22/17 - mild lateral wall ischemia; preserved LVEF; positive study without high risk markers   1.2 LHC 3/26/18 - normal coronary arteries; normal right heart pressures, no pulmonary HTN  2. Shortness of breath   2.1 Echo 2/22/17 - mild LVH; EF 60-65%; DD II; mild MR, TR and FL; PA 20-25  3. Edema  4. Chronic Cough   5. Hypertension   6. Hyperlipidemia   7. Dizziness/ lightheadedness   8. Rheumatoid arthritis, lupus, scleroderma  9. Diabetes; diet controlled   10. IBS   11. GERD    Patient is a 63-year-old female who presents today for follow-up.  She denies any chest pain or pressure.  She says she does still have some chest tightness when she coughs.  She says she is finally seen a allergist and found that she is allergic to multiple things and is currently getting 6 shots every other day and says this is helped tremendously with her cough.  She says she has palpitations that come and go but she does not pay too much attention to them.  She does get dizzy at times and it is random.  She denies any presyncope, syncope, orthopnea or PND.  She does get some swelling which she says does not go down overnight but she is been taking her water pill.  She says it is basically in her joints and she does have arthritis.  She says she has shortness of breath with activity and this is improved a little bit since she is been getting her injections for her allergies.    Current Outpatient Medications   Medication Sig Dispense Refill   • amLODIPine (NORVASC) 10 MG tablet Take 1 tablet by mouth Daily. 30 tablet 11   • aspirin 81 MG chewable tablet Chew 1 tablet Daily. 30 tablet 11   • azelastine (ASTELIN) 0.1 % nasal spray 2 sprays into the nostril(s) as directed by provider 2 (Two) Times a Day. Use in each nostril as directed     •  dexlansoprazole (DEXILANT) 60 MG capsule Take 60 mg by mouth Daily.     • DULoxetine (CYMBALTA) 30 MG capsule Take 30 mg by mouth Daily.     • furosemide (LASIX) 20 MG tablet Take 1 tablet by mouth Daily As Needed (edema). 90 tablet 3   • isosorbide mononitrate (IMDUR) 30 MG 24 hr tablet TAKE 1/2 TABLET DAILY 90 tablet 3   • levocetirizine (XYZAL) 5 MG tablet Take 5 mg by mouth Every Evening.     • losartan (COZAAR) 50 MG tablet Take 2 tablets by mouth Daily. 180 tablet 3   • meloxicam (MOBIC) 15 MG tablet Take 15 mg by mouth Daily.     • montelukast (SINGULAIR) 10 MG tablet Take 10 mg by mouth Every Night.     • nitroglycerin (NITROSTAT) 0.4 MG SL tablet 1 under the tongue as needed for angina, may repeat q5mins for up three doses 30 tablet 5   • potassium chloride (K-DUR,KLOR-CON) 20 MEQ CR tablet Take 1 tablet by mouth Daily. 90 tablet 3   • RiTUXimab (RITUXAN) 10 MG/ML solution injection Infuse 1,000 mg into a venous catheter. Gets every few months, then not for 6 months     • sucralfate (CARAFATE) 1 g tablet 2 (Two) Times a Day.     • Tiotropium Bromide Monohydrate (SPIRIVA RESPIMAT IN) Inhale.     • triamterene-hydrochlorothiazide (DYAZIDE) 37.5-25 MG per capsule Take 1 capsule by mouth Every Morning.     • trospium 60 MG capsule sustained-release 24 hr capsule Take  by mouth Every Morning.       No current facility-administered medications for this visit.        ALLERGIES    Diovan [valsartan] and Iodine    Past Medical History:   Diagnosis Date   • Anxiety and depression    • Arthritis    • Chest pain    • Chronic cough    • Diabetes (CMS/HCC)    • Fibromyalgia    • GERD (gastroesophageal reflux disease)    • High triglycerides    • Hypertension    • Irritable bowel syndrome    • Lupus (systemic lupus erythematosus) (CMS/HCC)    • Migraines    • Scleroderma (CMS/HCC)    • Seasonal allergies    • Ulcerative colitis (CMS/HCC)        Social History     Socioeconomic History   • Marital status:       Spouse name: Not on file   • Number of children: 0   • Years of education: Not on file   • Highest education level: Not on file   Occupational History   • Occupation:      [employer]er: [employer]   Tobacco Use   • Smoking status: Former Smoker     Types: Cigarettes     Last attempt to quit: 2002     Years since quittin.7   • Smokeless tobacco: Never Used   • Tobacco comment: quit 15 yrs ago    Substance and Sexual Activity   • Alcohol use: No   • Drug use: No   • Sexual activity: Defer       Family History   Problem Relation Age of Onset   • COPD Mother    • Hypertension Mother    • Diverticulitis Father 45   • No Known Problems Son    • No Known Problems Daughter    • Stomach cancer Paternal Grandmother        Review of Systems   Constitutional: Positive for fatigue. Negative for diaphoresis.   HENT: Negative for congestion, postnasal drip, rhinorrhea and sore throat.    Eyes: Positive for visual disturbance (glasses daily ).   Respiratory: Positive for cough (x 6+ years ), chest tightness (with cough ) and shortness of breath.    Cardiovascular: Positive for palpitations (occasionally; comes and goes; does not pay too much attention to it) and leg swelling (BLE edema, doesn't go down overnight; taking water pill daily; ankles, knees and hands ). Negative for chest pain (Denies CP ).   Gastrointestinal: Positive for constipation (back and forth- seeing GI ), diarrhea (back and forth- seeing GI ) and nausea. Negative for abdominal pain and vomiting.   Endocrine: Positive for heat intolerance. Negative for cold intolerance.   Genitourinary: Positive for frequency and urgency. Negative for difficulty urinating and dysuria.   Musculoskeletal: Positive for arthralgias and back pain (lower back ). Negative for neck pain.   Skin: Negative for rash and wound.   Allergic/Immunologic: Positive for environmental allergies (recently had allergy testing; 6 shots a day, but says cough is better).  "Negative for food allergies.   Neurological: Positive for dizziness (random ) and headaches (every other day ). Negative for syncope, weakness, light-headedness and numbness.   Hematological: Bruises/bleeds easily (bruises).   Psychiatric/Behavioral: Positive for sleep disturbance (Issues falling asleep and staying asleep ).       Objective   /84   Pulse 116   Ht 152.4 cm (60\")   Wt 118 kg (260 lb 12.8 oz)   SpO2 98%   BMI 50.93 kg/m²   Vitals:    10/14/19 0825   BP: 145/84   Pulse: 116   SpO2: 98%   Weight: 118 kg (260 lb 12.8 oz)   Height: 152.4 cm (60\")      Lab Results (most recent)     None        Physical Exam   Constitutional: She is oriented to person, place, and time. Vital signs are normal. She appears well-developed and well-nourished. She is active and cooperative.   HENT:   Head: Normocephalic.   Eyes: Lids are normal.   Wears glasses    Neck: Normal carotid pulses, no hepatojugular reflux and no JVD present. Carotid bruit is not present.   Cardiovascular: Regular rhythm and normal heart sounds. Tachycardia present.   Pulses:       Radial pulses are 2+ on the right side, and 2+ on the left side.        Dorsalis pedis pulses are 2+ on the right side, and 2+ on the left side.        Posterior tibial pulses are 2+ on the right side, and 2+ on the left side.   No edema BLE.   Pulmonary/Chest: Effort normal and breath sounds normal.   Abdominal: Normal appearance and bowel sounds are normal.   Neurological: She is alert and oriented to person, place, and time.   Skin: Skin is warm, dry and intact.   Psychiatric: She has a normal mood and affect. Her speech is normal and behavior is normal. Judgment and thought content normal. Cognition and memory are normal.       Procedure   Procedures         Assessment/Plan      Diagnosis Plan   1. Essential hypertension     2. Mixed hyperlipidemia     3. Shortness of breath     4. Peripheral edema     5. Hypokalemia  potassium chloride (K-DUR,KLOR-CON) 20 MEQ " CR tablet       Return in about 6 months (around 4/14/2020).    Hypertension-blood pressure slightly elevated today however typically is doing well on losartan, Dyazide and amlodipine.  Hyperlipidemia-patient is diet controlled.  Shortness of breath-stable.  Peripheral edema-patient uses Lasix.  Hypokalemia-patient uses potassium.  She will continue her medication regimen.  She will follow-up in 6 months or sooner if any changes.           Patient's Body mass index is 50.93 kg/m². BMI is above normal parameters. Recommendations include: educational material.      Electronically signed by:

## 2019-10-14 NOTE — PATIENT INSTRUCTIONS
"Fat and Cholesterol Restricted Eating Plan  Getting too much fat and cholesterol in your diet may cause health problems. Choosing the right foods helps keep your fat and cholesterol at normal levels. This can keep you from getting certain diseases.  Your doctor may recommend an eating plan that includes:  · Total fat: ______% or less of total calories a day.  · Saturated fat: ______% or less of total calories a day.  · Cholesterol: less than _________mg a day.  · Fiber: ______g a day.  What are tips for following this plan?  General tips    · Work with your doctor to lose weight if you need to.  · Avoid:  ? Foods with added sugar.  ? Fried foods.  ? Foods with partially hydrogenated oils.  · Limit alcohol intake to no more than 1 drink a day for nonpregnant women and 2 drinks a day for men. One drink equals 12 oz of beer, 5 oz of wine, or 1½ oz of hard liquor.  Reading food labels  · Check food labels for:  ? Trans fats.  ? Partially hydrogenated oils.  ? Saturated fat (g) in each serving.  ? Cholesterol (mg) in each serving.  ? Fiber (g) in each serving.  · Choose foods with healthy fats, such as:  ? Monounsaturated fats.  ? Polyunsaturated fats.  ? Omega-3 fats.  · Choose grain products that have whole grains. Look for the word \"whole\" as the first word in the ingredient list.  Cooking  · Cook foods using low-fat methods. These include baking, boiling, grilling, and broiling.  · Eat more home-cooked foods. Eat at restaurants and buffets less often.  · Avoid cooking using saturated fats, such as butter, cream, palm oil, palm kernel oil, and coconut oil.  Meal planning    · At meals, divide your plate into four equal parts:  ? Fill one-half of your plate with vegetables and green salads.  ? Fill one-fourth of your plate with whole grains.  ? Fill one-fourth of your plate with low-fat (lean) protein foods.  · Eat fish that is high in omega-3 fats at least two times a week. This includes mackerel, tuna, sardines, and " salmon.  · Eat foods that are high in fiber, such as whole grains, beans, apples, broccoli, carrots, peas, and barley.  Recommended foods  Grains  · Whole grains, such as whole wheat or whole grain breads, crackers, cereals, and pasta. Unsweetened oatmeal, bulgur, barley, quinoa, or brown rice. Corn or whole wheat flour tortillas.  Vegetables  · Fresh or frozen vegetables (raw, steamed, roasted, or grilled). Green salads.  Fruits  · All fresh, canned (in natural juice), or frozen fruits.  Meats and other protein foods  · Ground beef (85% or leaner), grass-fed beef, or beef trimmed of fat. Skinless chicken or turkey. Ground chicken or turkey. Pork trimmed of fat. All fish and seafood. Egg whites. Dried beans, peas, or lentils. Unsalted nuts or seeds. Unsalted canned beans. Nut butters without added sugar or oil.  Dairy  · Low-fat or nonfat dairy products, such as skim or 1% milk, 2% or reduced-fat cheeses, low-fat and fat-free ricotta or cottage cheese, or plain low-fat and nonfat yogurt.  Fats and oils  · Tub margarine without trans fats. Light or reduced-fat mayonnaise and salad dressings. Avocado. Olive, canola, sesame, or safflower oils.  The items listed above may not be a complete list of recommended foods or beverages. Contact your dietitian for more options.  The items listed above may not be a complete list of foods and beverages [you/your child] can eat. Contact a dietitian for more information.  Foods to avoid  Grains  · White bread. White pasta. White rice. Cornbread. Bagels, pastries, and croissants. Crackers and snack foods that contain trans fat and hydrogenated oils.  Vegetables  · Vegetables cooked in cheese, cream, or butter sauce. Fried vegetables.  Fruits  · Canned fruit in heavy syrup. Fruit in cream or butter sauce. Fried fruit.  Meats and other protein foods  · Fatty cuts of meat. Ribs, chicken wings, [place], sausage, bologna, salami, chitterlings, fatback, hot dogs, bratwurst, and packaged  lunch meats. Liver and organ meats. Whole eggs and egg yolks. Chicken and turkey with skin. Fried meat.  Dairy  · Whole or 2% milk, cream, half-and-half, and cream cheese. Whole milk cheeses. Whole-fat or sweetened yogurt. Full-fat cheeses. Nondairy creamers and whipped toppings. Processed cheese, cheese spreads, and cheese curds.  Beverages  · Alcohol. Sugar-sweetened drinks such as sodas, lemonade, and fruit drinks.  Fats and oils  · Butter, stick margarine, lard, shortening, ghee, or [place] fat. Coconut, palm kernel, and palm oils.  Sweets and desserts  · Corn syrup, sugars, honey, and molasses. Candy. Jam and jelly. Syrup. Sweetened cereals. Cookies, pies, cakes, donuts, muffins, and ice cream.  The items listed above may not be a complete list of foods and beverages to avoid. Contact your dietitian for more information.  The items listed above may not be a complete list of foods and beverages [you/your child] should avoid. Contact a dietitian for more information.  Summary  · Choosing the right foods helps keep your fat and cholesterol at normal levels. This can keep you from getting certain diseases.  · At meals, fill one-half of your plate with vegetables and green salads.  · Eat high-fiber foods, like whole grains, beans, apples, carrots, peas, and barley.  · Limit added sugar, saturated fats, alcohol, and fried foods.  This information is not intended to replace advice given to you by your health care provider. Make sure you discuss any questions you have with your health care provider.  Document Released: 06/18/2013 Document Revised: 09/04/2018 Document Reviewed: 09/04/2018  [employer] Interactive Patient Education © 2019 [employer]  BMI for Adults    Body mass index (BMI) is a number that is calculated from a person's weight and height. BMI may help to estimate how much of a person's weight is composed of fat. BMI can help identify those who may be at higher risk for certain medical problems.  How is BMI  "used with adults?  BMI is used as a screening tool to identify possible weight problems. It is used to check whether a person is obese, overweight, healthy weight, or underweight.  How is BMI calculated?  BMI measures your weight and compares it to your height. This can be done either in English ([place] or metric measurements. Note that charts are available to help you find your BMI quickly and easily without having to do these calculations yourself.  To calculate your BMI in English ([place] measurements, your health care provider will:  1. Measure your weight in pounds (lb).  2. Multiply the number of pounds by 703.  ? For example, for a person who weighs 180 lb, multiply that number by 703, which equals 126,540.  3. Measure your height in inches (in). Then multiply that number by itself to get a measurement called \"inches squared.\"  ? For example, for a person who is 70 in tall, the \"inches squared\" measurement is 70 in x 70 in, which equals 4900 inches squared.  4. Divide the total from Step 2 (number of lb x 703) by the total from Step 3 (inches squared): 126,540 ÷ 4900 = 25.8. This is your BMI.  To calculate your BMI in metric measurements, your health care provider will:  1. Measure your weight in kilograms (kg).  2. Measure your height in meters (m). Then multiply that number by itself to get a measurement called \"meters squared.\"  ? For example, for a person who is 1.75 m tall, the \"meters squared\" measurement is 1.75 m x 1.75 m, which is equal to 3.1 meters squared.  3. Divide the number of kilograms (your weight) by the meters squared number. In this example: 70 ÷ 3.1 = 22.6. This is your BMI.  How is BMI interpreted?  To interpret your results, your health care provider will use BMI charts to identify whether you are underweight, normal weight, overweight, or obese. The following guidelines will be used:  · Underweight: BMI less than 18.5.  · Normal weight: BMI between 18.5 and 24.9.  · Overweight: BMI " between 25 and 29.9.  · Obese: BMI of 30 and above.  Please note:  · Weight includes both fat and muscle, so someone with a muscular build, such as an athlete, may have a BMI that is higher than 24.9. In cases like these, BMI is not an accurate measure of body fat.  · To determine if excess body fat is the cause of a BMI of 25 or higher, further assessments may need to be done by a health care provider.  · BMI is usually interpreted in the same way for men and women.  Why is BMI a useful tool?  BMI is useful in two ways:  · Identifying a weight problem that may be related to a medical condition, or that may increase the risk for medical problems.  · Promoting lifestyle and diet changes in order to reach a healthy weight.  Summary  · Body mass index (BMI) is a number that is calculated from a person's weight and height.  · BMI may help to estimate how much of a person's weight is composed of fat. BMI can help identify those who may be at higher risk for certain medical problems.  · BMI can be measured using English measurements or metric measurements.  · To interpret your results, your health care provider will use BMI charts to identify whether you are underweight, normal weight, overweight, or obese.  This information is not intended to replace advice given to you by your health care provider. Make sure you discuss any questions you have with your health care provider.  Document Released: 08/29/2005 Document Revised: 10/31/2018 Document R  Edema    Edema is an abnormal buildup of fluids in the body tissues and under the skin. Swelling of the legs, feet, and ankles is a common symptom that becomes more likely as you get older. Swelling is also common in looser tissues, like around the eyes. When the affected area is squeezed, the fluid may move out of that spot and leave a dent for a few moments. This dent is called pitting edema.  There are many possible causes of edema. Eating too much salt (sodium) and being on  your feet or sitting for a long time can cause edema in your legs, feet, and ankles. Hot weather may make edema worse. Common causes of edema include:  · Heart failure.  · Liver or kidney disease.  · Weak leg blood vessels.  · Cancer.  · An injury.  · Pregnancy.  · Medicines.  · Being obese.  · Low protein levels in the blood.  Edema is usually painless. Your skin may look swollen or shiny.  Follow these instructions at home:  · Keep the affected body part raised (elevated) above the level of your heart when you are sitting or lying down.  · Do not sit still or stand for long periods of time.  · Do not wear tight clothing. Do not wear garters on your upper legs.  · Exercise your legs to get your circulation going. This helps to move the fluid back into your blood vessels, and it may help the swelling go down.  · Wear elastic bandages or support stockings to reduce swelling as told by your health care provider.  · Eat a low-salt (low-sodium) diet to reduce fluid as told by your health care provider.  · Depending on the cause of your swelling, you may need to limit how much fluid you drink (fluid restriction).  · Take over-the-counter and prescription medicines only as told by your health care provider.  Contact a health care provider if:  · Your edema does not get better with treatment.  · You have heart, liver, or kidney disease and have symptoms of edema.  · You have sudden and unexplained weight gain.  Get help right away if:  · You develop shortness of breath or chest pain.  · You cannot breathe when you lie down.  · You develop pain, redness, or warmth in the swollen areas.  · You have heart, liver, or kidney disease and suddenly get edema.  · You have a fever and your symptoms suddenly get worse.  Summary  · Edema is an abnormal buildup of fluids in the body tissues and under the skin.  · Eating too much salt (sodium) and being on your feet or sitting for a long time can cause edema in your legs, feet, and  ankles.  · Keep the affected body part raised (elevated) above the level of your heart when you are sitting or lying down.  This information is not intended to replace advice given to you by your health care provider. Make sure you discuss any questions you have with your health care provider.  Document Released: 12/18/2006 Document Revised: 01/20/2018 Document Reviewed: 01/20/2018  Elsevier Interactive Patient Education © 2019 [employer]  eviewed: 10/31/2018  Elsevier Interactive Patient Education © 2019 [employer] Inc.

## 2020-04-14 ENCOUNTER — OFFICE VISIT (OUTPATIENT)
Dept: CARDIOLOGY | Facility: CLINIC | Age: 65
End: 2020-04-14

## 2020-04-14 DIAGNOSIS — K21.9 GASTROESOPHAGEAL REFLUX DISEASE, ESOPHAGITIS PRESENCE NOT SPECIFIED: ICD-10-CM

## 2020-04-14 DIAGNOSIS — R06.02 SHORTNESS OF BREATH: ICD-10-CM

## 2020-04-14 DIAGNOSIS — R60.9 PERIPHERAL EDEMA: ICD-10-CM

## 2020-04-14 DIAGNOSIS — E78.2 MIXED HYPERLIPIDEMIA: ICD-10-CM

## 2020-04-14 DIAGNOSIS — I10 ESSENTIAL HYPERTENSION: Primary | ICD-10-CM

## 2020-04-14 DIAGNOSIS — R00.0 TACHYCARDIA: ICD-10-CM

## 2020-04-14 PROCEDURE — 99214 OFFICE O/P EST MOD 30 MIN: CPT | Performed by: NURSE PRACTITIONER

## 2020-04-14 RX ORDER — LOSARTAN POTASSIUM 50 MG/1
100 TABLET ORAL DAILY
Qty: 180 TABLET | Refills: 3 | Status: SHIPPED | OUTPATIENT
Start: 2020-04-14 | End: 2020-09-23 | Stop reason: SDUPTHER

## 2020-04-14 RX ORDER — RABEPRAZOLE SODIUM 20 MG/1
20 TABLET, DELAYED RELEASE ORAL DAILY
Qty: 90 TABLET | Refills: 0 | Status: SHIPPED | OUTPATIENT
Start: 2020-04-14 | End: 2020-07-23

## 2020-04-14 RX ORDER — ERGOCALCIFEROL 1.25 MG/1
50000 CAPSULE ORAL WEEKLY
COMMUNITY
Start: 2020-01-24 | End: 2020-09-23 | Stop reason: SDDI

## 2020-04-14 NOTE — PROGRESS NOTES
Subjective   Karyna Stewart is a 64 y.o. female     Chief Complaint   Patient presents with   • Hypertension       HPI    PROBLEM LIST:     1. Chest pain   1.1 Stress Test 2/22/17 - mild lateral wall ischemia; preserved LVEF; positive study without high risk markers   1.2 LHC 3/26/18 - normal coronary arteries; normal right heart pressures, no pulmonary HTN  2. Shortness of breath   2.1 Echo 2/22/17 - mild LVH; EF 60-65%; DD II; mild MR, TR and IL; PA 20-25  3. Edema  4. Chronic Cough   5. Hypertension   6. Hyperlipidemia   7. Dizziness/ lightheadedness   8. Rheumatoid arthritis, lupus, scleroderma  9. Diabetes; diet controlled   10. IBS   11. GERD    Patient is a 64-year-old female who presents today for a telephone visit and replacement of her regular office visit due to COVID19.  She denies any chest pain, pressure, palpitations, fluttering, dizziness, presyncope, syncope, orthopnea or PND.  She says she does get swelling in her legs only on occasion and will use her Lasix.  She says she has had some lightheadedness when she gets really short of breath.  She does get short of breath easily.  She says her cough has finally resolved since she was allergy tested and treated for her environmental allergies.  Overall she is doing well.  PCP monitors her cholesterol.    Current Outpatient Medications on File Prior to Visit   Medication Sig Dispense Refill   • amLODIPine (NORVASC) 10 MG tablet Take 1 tablet by mouth Daily. 30 tablet 11   • aspirin 81 MG chewable tablet Chew 1 tablet Daily. 30 tablet 11   • azelastine (ASTELIN) 0.1 % nasal spray 2 sprays into the nostril(s) as directed by provider 2 (Two) Times a Day. Use in each nostril as directed     • DULoxetine (CYMBALTA) 30 MG capsule Take 30 mg by mouth Daily.     • furosemide (LASIX) 20 MG tablet Take 1 tablet by mouth Daily As Needed (edema). 90 tablet 3   • isosorbide mononitrate (IMDUR) 30 MG 24 hr tablet TAKE 1/2 TABLET DAILY 90 tablet 3   •  levocetirizine (XYZAL) 5 MG tablet Take 5 mg by mouth Every Evening.     • meloxicam (MOBIC) 15 MG tablet Take 15 mg by mouth Daily.     • montelukast (SINGULAIR) 10 MG tablet Take 10 mg by mouth Every Night.     • nitroglycerin (NITROSTAT) 0.4 MG SL tablet 1 under the tongue as needed for angina, may repeat q5mins for up three doses 30 tablet 5   • potassium chloride (K-DUR,KLOR-CON) 20 MEQ CR tablet Take 1 tablet by mouth Daily. 90 tablet 3   • sucralfate (CARAFATE) 1 g tablet 2 (Two) Times a Day.     • Tiotropium Bromide Monohydrate (SPIRIVA RESPIMAT IN) Inhale.     • triamterene-hydrochlorothiazide (DYAZIDE) 37.5-25 MG per capsule Take 1 capsule by mouth Every Morning.     • trospium 60 MG capsule sustained-release 24 hr capsule Take  by mouth Every Morning.     • vitamin D (ERGOCALCIFEROL) 1.25 MG (73403 UT) capsule capsule Take 50,000 Units by mouth 1 (One) Time Per Week.     • [DISCONTINUED] losartan (COZAAR) 50 MG tablet Take 2 tablets by mouth Daily. 180 tablet 3   • [DISCONTINUED] dexlansoprazole (DEXILANT) 60 MG capsule Take 60 mg by mouth Daily.     • [DISCONTINUED] RiTUXimab (RITUXAN) 10 MG/ML solution injection Infuse 1,000 mg into a venous catheter. Gets every few months, then not for 6 months       No current facility-administered medications on file prior to visit.        ALLERGIES    Diovan [valsartan] and Iodine    Past Medical History:   Diagnosis Date   • Anxiety and depression    • Arthritis    • Chest pain    • Chronic cough    • Diabetes (CMS/HCC)    • Fibromyalgia    • GERD (gastroesophageal reflux disease)    • High triglycerides    • Hypertension    • Irritable bowel syndrome    • Lupus (systemic lupus erythematosus) (CMS/HCC)    • Migraines    • Scleroderma (CMS/HCC)    • Seasonal allergies    • Ulcerative colitis (CMS/HCC)        Social History     Socioeconomic History   • Marital status:      Spouse name: Not on file   • Number of children: 0   • Years of education: Not on file    • Highest education level: Not on file   Occupational History   • Occupation:      [employer]er: [employer]   Tobacco Use   • Smoking status: Former Smoker     Types: Cigarettes     Last attempt to quit: 2002     Years since quittin.2   • Smokeless tobacco: Never Used   • Tobacco comment: quit 15 yrs ago    Substance and Sexual Activity   • Alcohol use: No   • Drug use: No   • Sexual activity: Defer       Family History   Problem Relation Age of Onset   • COPD Mother    • Hypertension Mother    • Diverticulitis Father 45   • No Known Problems Son    • No Known Problems Daughter    • Stomach cancer Paternal Grandmother        Review of Systems   Constitutional: Positive for fatigue (easily fatigued). Negative for diaphoresis.   HENT: Negative for congestion, rhinorrhea and sneezing.    Eyes: Positive for visual disturbance (wears glasses daily).   Respiratory: Positive for shortness of breath (easily SOA on exertion ). Negative for cough (resolved since taking allergy shots), chest tightness and wheezing.    Cardiovascular: Positive for leg swelling (occasional BLE swelling/edema). Negative for chest pain and palpitations.   Gastrointestinal: Negative for abdominal pain, blood in stool, constipation, diarrhea, nausea and vomiting.   Endocrine: Negative for cold intolerance and heat intolerance.   Genitourinary: Positive for frequency (urinary frequency). Negative for difficulty urinating, hematuria and urgency.   Musculoskeletal: Positive for arthralgias (joints). Negative for back pain and neck pain.   Skin: Negative for rash and wound.   Allergic/Immunologic: Positive for environmental allergies (seasonal allergies). Negative for food allergies.   Neurological: Positive for light-headedness (occasional light headedness with exertion and SOA). Negative for dizziness, syncope and weakness.   Hematological: Does not bruise/bleed easily.   Psychiatric/Behavioral: Positive for agitation  (easily agitated). Negative for confusion and sleep disturbance (denies waking up smothering/SOA). The patient is nervous/anxious (easily anxious).        Objective   There were no vitals taken for this visit.  Vitals:      Lab Results (most recent)     None        Physical Exam   Psychiatric: She has a normal mood and affect. Her speech is normal and behavior is normal. Judgment and thought content normal. Cognition and memory are normal.       Procedure   Procedures         Assessment/Plan      Diagnosis Plan   1. Essential hypertension  losartan (Cozaar) 50 MG tablet   2. Mixed hyperlipidemia     3. Tachycardia     4. Shortness of breath     5. Peripheral edema     6. Gastroesophageal reflux disease, esophagitis presence not specified  RABEprazole (ACIPHEX) 20 MG EC tablet       Return in about 6 months (around 10/14/2020).    Hypertension-patient is on amlodipine and losartan.  Hyperlipidemia-patient is diet controlled.  Tachycardia-stable.  Shortness of breath-stable.  Peripheral edema-patient uses Lasix as needed.  GERD-send in AcipHex for patient as her insurance would not pay for her Dexilant and she is unable to get to [place] to see her gastroenterologist due to current COVID 19 restrictions.  She will continue her medication regimen for now.  She will follow-up in 6 months or sooner if any changes.     This visit has been rescheduled as a phone visit to comply with patient safety concerns in accordance with CDC recommendations. Total time of discussion was 6 minutes.      You have chosen to receive care through a telephone visit. Do you consent to use a telephone visit for your medical care today? Yes    Patient's There is no height or weight on file to calculate BMI. BMI is above normal parameters. Recommendations include: educational material and referral to primary care.      Electronically signed by:

## 2020-04-15 ENCOUNTER — DOCUMENTATION (OUTPATIENT)
Dept: CARDIOLOGY | Facility: CLINIC | Age: 65
End: 2020-04-15

## 2020-04-15 NOTE — PROGRESS NOTES
P.A. Was submitted for Rabeprazole 20 mg. Fax was received for approval on 04/15/2020 and scanned into chart. [facility]

## 2020-05-20 DIAGNOSIS — R94.39 ABNORMAL STRESS TEST: ICD-10-CM

## 2020-05-20 RX ORDER — ISOSORBIDE MONONITRATE 30 MG/1
TABLET, EXTENDED RELEASE ORAL
Qty: 45 TABLET | Refills: 3 | Status: SHIPPED | OUTPATIENT
Start: 2020-05-20 | End: 2021-06-17 | Stop reason: SDUPTHER

## 2020-05-29 DIAGNOSIS — R60.9 PERIPHERAL EDEMA: ICD-10-CM

## 2020-05-29 RX ORDER — FUROSEMIDE 20 MG/1
TABLET ORAL
Qty: 90 TABLET | Refills: 0 | Status: SHIPPED | OUTPATIENT
Start: 2020-05-29 | End: 2020-09-08

## 2020-07-23 DIAGNOSIS — K21.9 GASTROESOPHAGEAL REFLUX DISEASE, ESOPHAGITIS PRESENCE NOT SPECIFIED: ICD-10-CM

## 2020-07-23 RX ORDER — RABEPRAZOLE SODIUM 20 MG/1
TABLET, DELAYED RELEASE ORAL
Qty: 90 TABLET | Refills: 0 | Status: SHIPPED | OUTPATIENT
Start: 2020-07-23 | End: 2020-10-22

## 2020-09-08 DIAGNOSIS — R60.9 PERIPHERAL EDEMA: ICD-10-CM

## 2020-09-08 RX ORDER — FUROSEMIDE 20 MG/1
TABLET ORAL
Qty: 90 TABLET | Refills: 0 | Status: SHIPPED | OUTPATIENT
Start: 2020-09-08 | End: 2020-09-23 | Stop reason: SDUPTHER

## 2020-09-23 ENCOUNTER — LAB (OUTPATIENT)
Dept: CARDIOLOGY | Facility: CLINIC | Age: 65
End: 2020-09-23

## 2020-09-23 ENCOUNTER — OFFICE VISIT (OUTPATIENT)
Dept: CARDIOLOGY | Facility: CLINIC | Age: 65
End: 2020-09-23

## 2020-09-23 VITALS
HEART RATE: 118 BPM | HEIGHT: 60 IN | OXYGEN SATURATION: 98 % | WEIGHT: 247 LBS | BODY MASS INDEX: 48.49 KG/M2 | DIASTOLIC BLOOD PRESSURE: 96 MMHG | SYSTOLIC BLOOD PRESSURE: 156 MMHG

## 2020-09-23 DIAGNOSIS — R00.2 PALPITATIONS: ICD-10-CM

## 2020-09-23 DIAGNOSIS — R06.02 SHORTNESS OF BREATH: ICD-10-CM

## 2020-09-23 DIAGNOSIS — Z00.00 HEALTHCARE MAINTENANCE: ICD-10-CM

## 2020-09-23 DIAGNOSIS — I10 ESSENTIAL HYPERTENSION: ICD-10-CM

## 2020-09-23 DIAGNOSIS — R73.03 BORDERLINE DIABETES MELLITUS: ICD-10-CM

## 2020-09-23 DIAGNOSIS — E87.6 HYPOKALEMIA: ICD-10-CM

## 2020-09-23 DIAGNOSIS — E78.2 MIXED HYPERLIPIDEMIA: ICD-10-CM

## 2020-09-23 DIAGNOSIS — R60.9 PERIPHERAL EDEMA: ICD-10-CM

## 2020-09-23 DIAGNOSIS — R07.89 CHEST TIGHTNESS: ICD-10-CM

## 2020-09-23 DIAGNOSIS — R42 DIZZINESS: ICD-10-CM

## 2020-09-23 DIAGNOSIS — I10 ESSENTIAL HYPERTENSION: Primary | ICD-10-CM

## 2020-09-23 DIAGNOSIS — R00.0 TACHYCARDIA: ICD-10-CM

## 2020-09-23 DIAGNOSIS — I51.89 GRADE II DIASTOLIC DYSFUNCTION: ICD-10-CM

## 2020-09-23 PROBLEM — J30.2 SEASONAL ALLERGIES: Status: ACTIVE | Noted: 2020-09-23

## 2020-09-23 LAB
ALBUMIN SERPL-MCNC: 3.93 G/DL (ref 3.5–5.2)
ALBUMIN/GLOB SERPL: 1.3 G/DL
ALP SERPL-CCNC: 65 U/L (ref 39–117)
ALT SERPL W P-5'-P-CCNC: 10 U/L (ref 1–33)
ANION GAP SERPL CALCULATED.3IONS-SCNC: 15.4 MMOL/L (ref 5–15)
AST SERPL-CCNC: 13 U/L (ref 1–32)
BILIRUB SERPL-MCNC: 0.2 MG/DL (ref 0–1.2)
BUN SERPL-MCNC: 14 MG/DL (ref 8–23)
BUN/CREAT SERPL: 21.5 (ref 7–25)
CALCIUM SPEC-SCNC: 9 MG/DL (ref 8.6–10.5)
CHLORIDE SERPL-SCNC: 102 MMOL/L (ref 98–107)
CHOLEST SERPL-MCNC: 191 MG/DL (ref 0–200)
CO2 SERPL-SCNC: 24.6 MMOL/L (ref 22–29)
CREAT SERPL-MCNC: 0.65 MG/DL (ref 0.57–1)
GFR SERPL CREATININE-BSD FRML MDRD: 111 ML/MIN/1.73
GLOBULIN UR ELPH-MCNC: 3.1 GM/DL
GLUCOSE SERPL-MCNC: 96 MG/DL (ref 65–99)
HBA1C MFR BLD: 6.1 % (ref 4.8–5.6)
HDLC SERPL-MCNC: 51 MG/DL (ref 40–60)
LDLC SERPL CALC-MCNC: 120 MG/DL (ref 0–100)
LDLC/HDLC SERPL: 2.35 {RATIO}
MAGNESIUM SERPL-MCNC: 2 MG/DL (ref 1.6–2.4)
POTASSIUM SERPL-SCNC: 3.1 MMOL/L (ref 3.5–5.2)
PROT SERPL-MCNC: 7 G/DL (ref 6–8.5)
SODIUM SERPL-SCNC: 142 MMOL/L (ref 136–145)
TRIGL SERPL-MCNC: 102 MG/DL (ref 0–150)
VLDLC SERPL-MCNC: 20.4 MG/DL

## 2020-09-23 PROCEDURE — 36415 COLL VENOUS BLD VENIPUNCTURE: CPT

## 2020-09-23 PROCEDURE — 83735 ASSAY OF MAGNESIUM: CPT | Performed by: NURSE PRACTITIONER

## 2020-09-23 PROCEDURE — 83036 HEMOGLOBIN GLYCOSYLATED A1C: CPT | Performed by: NURSE PRACTITIONER

## 2020-09-23 PROCEDURE — 80053 COMPREHEN METABOLIC PANEL: CPT | Performed by: NURSE PRACTITIONER

## 2020-09-23 PROCEDURE — 80061 LIPID PANEL: CPT | Performed by: NURSE PRACTITIONER

## 2020-09-23 PROCEDURE — 99213 OFFICE O/P EST LOW 20 MIN: CPT | Performed by: NURSE PRACTITIONER

## 2020-09-23 PROCEDURE — 93000 ELECTROCARDIOGRAM COMPLETE: CPT | Performed by: NURSE PRACTITIONER

## 2020-09-23 RX ORDER — PREDNISONE 1 MG/1
5 TABLET ORAL DAILY
COMMUNITY

## 2020-09-23 RX ORDER — POTASSIUM CHLORIDE 20 MEQ/1
20 TABLET, EXTENDED RELEASE ORAL DAILY
Qty: 90 TABLET | Refills: 3 | Status: SHIPPED | OUTPATIENT
Start: 2020-09-23 | End: 2020-09-24 | Stop reason: ALTCHOICE

## 2020-09-23 RX ORDER — FUROSEMIDE 20 MG/1
20 TABLET ORAL DAILY
Qty: 90 TABLET | Refills: 3 | Status: SHIPPED | OUTPATIENT
Start: 2020-09-23 | End: 2020-12-15 | Stop reason: SDUPTHER

## 2020-09-23 RX ORDER — ATENOLOL 25 MG/1
25 TABLET ORAL DAILY
Qty: 90 TABLET | Refills: 3 | Status: SHIPPED | OUTPATIENT
Start: 2020-09-23 | End: 2021-06-17 | Stop reason: SDUPTHER

## 2020-09-23 RX ORDER — LOSARTAN POTASSIUM 50 MG/1
50 TABLET ORAL DAILY
Qty: 90 TABLET | Refills: 3 | Status: SHIPPED | OUTPATIENT
Start: 2020-09-23 | End: 2021-06-17 | Stop reason: SDUPTHER

## 2020-09-23 RX ORDER — LEFLUNOMIDE 10 MG/1
10 TABLET ORAL DAILY
COMMUNITY

## 2020-09-23 NOTE — PATIENT INSTRUCTIONS
"Fat and Cholesterol Restricted Eating Plan  Getting too much fat and cholesterol in your diet may cause health problems. Choosing the right foods helps keep your fat and cholesterol at normal levels. This can keep you from getting certain diseases.  Your doctor may recommend an eating plan that includes:  · Total fat: ______% or less of total calories a day.  · Saturated fat: ______% or less of total calories a day.  · Cholesterol: less than _________mg a day.  · Fiber: ______g a day.  What are tips for following this plan?  Meal planning  · At meals, divide your plate into four equal parts:  ? Fill one-half of your plate with vegetables and green salads.  ? Fill one-fourth of your plate with whole grains.  ? Fill one-fourth of your plate with low-fat (lean) protein foods.  · Eat fish that is high in omega-3 fats at least two times a week. This includes mackerel, tuna, sardines, and salmon.  · Eat foods that are high in fiber, such as whole grains, beans, apples, broccoli, carrots, peas, and barley.  General tips    · Work with your doctor to lose weight if you need to.  · Avoid:  ? Foods with added sugar.  ? Fried foods.  ? Foods with partially hydrogenated oils.  · Limit alcohol intake to no more than 1 drink a day for nonpregnant women and 2 drinks a day for men. One drink equals 12 oz of beer, 5 oz of wine, or 1½ oz of hard liquor.  Reading food labels  · Check food labels for:  ? Trans fats.  ? Partially hydrogenated oils.  ? Saturated fat (g) in each serving.  ? Cholesterol (mg) in each serving.  ? Fiber (g) in each serving.  · Choose foods with healthy fats, such as:  ? Monounsaturated fats.  ? Polyunsaturated fats.  ? Omega-3 fats.  · Choose grain products that have whole grains. Look for the word \"whole\" as the first word in the ingredient list.  Cooking  · Cook foods using low-fat methods. These include baking, boiling, grilling, and broiling.  · Eat more home-cooked foods. Eat at restaurants and buffets " less often.  · Avoid cooking using saturated fats, such as butter, cream, palm oil, palm kernel oil, and coconut oil.  Recommended foods    Fruits  · All fresh, canned (in natural juice), or frozen fruits.  Vegetables  · Fresh or frozen vegetables (raw, steamed, roasted, or grilled). Green salads.  Grains  · Whole grains, such as whole wheat or whole grain breads, crackers, cereals, and pasta. Unsweetened oatmeal, bulgur, barley, quinoa, or brown rice. Corn or whole wheat flour tortillas.  Meats and other protein foods  · Ground beef (85% or leaner), grass-fed beef, or beef trimmed of fat. Skinless chicken or turkey. Ground chicken or turkey. Pork trimmed of fat. All fish and seafood. Egg whites. Dried beans, peas, or lentils. Unsalted nuts or seeds. Unsalted canned beans. Nut butters without added sugar or oil.  Dairy  · Low-fat or nonfat dairy products, such as skim or 1% milk, 2% or reduced-fat cheeses, low-fat and fat-free ricotta or cottage cheese, or plain low-fat and nonfat yogurt.  Fats and oils  · Tub margarine without trans fats. Light or reduced-fat mayonnaise and salad dressings. Avocado. Olive, canola, sesame, or safflower oils.  The items listed above may not be a complete list of foods and beverages you can eat. Contact a dietitian for more information.  Foods to avoid  Fruits  · Canned fruit in heavy syrup. Fruit in cream or butter sauce. Fried fruit.  Vegetables  · Vegetables cooked in cheese, cream, or butter sauce. Fried vegetables.  Grains  · White bread. White pasta. White rice. Cornbread. Bagels, pastries, and croissants. Crackers and snack foods that contain trans fat and hydrogenated oils.  Meats and other protein foods  · Fatty cuts of meat. Ribs, chicken wings, [place], sausage, bologna, salami, chitterlings, fatback, hot dogs, bratwurst, and packaged lunch meats. Liver and organ meats. Whole eggs and egg yolks. Chicken and turkey with skin. Fried meat.  Dairy  · Whole or 2% milk, cream,  half-and-half, and cream cheese. Whole milk cheeses. Whole-fat or sweetened yogurt. Full-fat cheeses. Nondairy creamers and whipped toppings. Processed cheese, cheese spreads, and cheese curds.  Beverages  · Alcohol. Sugar-sweetened drinks such as sodas, lemonade, and fruit drinks.  Fats and oils  · Butter, stick margarine, lard, shortening, ghee, or [place] fat. Coconut, palm kernel, and palm oils.  Sweets and desserts  · Corn syrup, sugars, honey, and molasses. Candy. Jam and jelly. Syrup. Sweetened cereals. Cookies, pies, cakes, donuts, muffins, and ice cream.  The items listed above may not be a complete list of foods and beverages you should avoid. Contact a dietitian for more information.  Summary  · Choosing the right foods helps keep your fat and cholesterol at normal levels. This can keep you from getting certain diseases.  · At meals, fill one-half of your plate with vegetables and green salads.  · Eat high-fiber foods, like whole grains, beans, apples, carrots, peas, and barley.  · Limit added sugar, saturated fats, alcohol, and fried foods.  This information is not intended to replace advice given to you by your health care provider. Make sure you discuss any questions you have with your health care provider.  Document Released: 06/18/2013 Document Revised: 08/21/2019 Document Reviewed: 09/04/2018  Elsevier Patient Education © 2020 [employer]  BMI for Adults    Body mass index (BMI) is a number that is calculated from a person's weight and height. BMI may help to estimate how much of a person's weight is composed of fat. BMI can help identify those who may be at higher risk for certain medical problems.  How is BMI used with adults?  BMI is used as a screening tool to identify possible weight problems. It is used to check whether a person is obese, overweight, healthy weight, or underweight.  How is BMI calculated?  BMI measures your weight and compares it to your height. This can be done either in  "English ([place] or metric measurements. Note that charts are available to help you find your BMI quickly and easily without having to do these calculations yourself.  To calculate your BMI in English ([place] measurements, your health care provider will:  1. Measure your weight in pounds (lb).  2. Multiply the number of pounds by 703.  ? For example, for a person who weighs 180 lb, multiply that number by 703, which equals 126,540.  3. Measure your height in inches (in). Then multiply that number by itself to get a measurement called \"inches squared.\"  ? For example, for a person who is 70 in tall, the \"inches squared\" measurement is 70 in x 70 in, which equals 4900 inches squared.  4. Divide the total from Step 2 (number of lb x 703) by the total from Step 3 (inches squared): 126,540 ÷ 4900 = 25.8. This is your BMI.  To calculate your BMI in metric measurements, your health care provider will:  1. Measure your weight in kilograms (kg).  2. Measure your height in meters (m). Then multiply that number by itself to get a measurement called \"meters squared.\"  ? For example, for a person who is 1.75 m tall, the \"meters squared\" measurement is 1.75 m x 1.75 m, which is equal to 3.1 meters squared.  3. Divide the number of kilograms (your weight) by the meters squared number. In this example: 70 ÷ 3.1 = 22.6. This is your BMI.  How is BMI interpreted?  To interpret your results, your health care provider will use BMI charts to identify whether you are underweight, normal weight, overweight, or obese. The following guidelines will be used:  · Underweight: BMI less than 18.5.  · Normal weight: BMI between 18.5 and 24.9.  · Overweight: BMI between 25 and 29.9.  · Obese: BMI of 30 and above.  Please note:  · Weight includes both fat and muscle, so someone with a muscular build, such as an athlete, may have a BMI that is higher than 24.9. In cases like these, BMI is not an accurate measure of body fat.  · To determine if excess " body fat is the cause of a BMI of 25 or higher, further assessments may need to be done by a health care provider.  · BMI is usually interpreted in the same way for men and women.  Why is BMI a useful tool?  BMI is useful in two ways:  · Identifying a weight problem that may be related to a medical condition, or that may increase the risk for medical problems.  · Promoting lifestyle and diet changes in order to reach a healthy weight.  Summary  · Body mass index (BMI) is a number that is calculated from a person's weight and height.  · BMI may help to estimate how much of a person's weight is composed of fat. BMI can help identify those who may be at higher risk for certain medical problems.  · BMI can be measured using English measurements or metric measurements.  · To interpret your results, your health care provider will use BMI charts to identify whether you are underweight, normal weight, overweight, or obese.  This information is not intended to replace advice given to you by your health care provider. Make sure you discuss any questions you have with your health care provider.  Document Released: 08/29/2005 Document Revised: 11/30/2018 Document Reviewed: 10/31/2018  [employer] Patient Education © 2020 [employer]    Sinus Tachycardia    Sinus tachycardia is a kind of fast heartbeat. In sinus tachycardia, the heart beats more than 100 times a minute. Sinus tachycardia starts in a part of the heart called the sinus node. Sinus tachycardia may be harmless, or it may be a sign of a serious condition.  What are the causes?  This condition may be caused by:  · Exercise or exertion.  · A fever.  · Pain.  · Loss of body fluids (dehydration).  · Severe bleeding (hemorrhage).  · Anxiety and stress.  · Certain substances, including:  ? Alcohol.  ? Caffeine.  ? Tobacco and nicotine products.  ? Cold medicines.  ? Illegal drugs.  · Medical conditions including:  ? Heart disease.  ? An infection.  ? An overactive thyroid  (hyperthyroidism).  ? A lack of red blood cells (anemia).  What are the signs or symptoms?  Symptoms of this condition include:  · A feeling that the heart is beating quickly (palpitations).  · Suddenly noticing your heartbeat (cardiac awareness).  · Dizziness.  · Tiredness (fatigue).  · Shortness of breath.  · Chest pain.  · Nausea.  · Fainting.  How is this diagnosed?  This condition is diagnosed with:  · A physical exam.  · Other tests, such as:  ? Blood tests.  ? An electrocardiogram (ECG). This test measures the electrical activity of the heart.  ? Ambulatory cardiac monitor. This records your heartbeats for 24 hours or more.  You may be referred to a heart specialist (cardiologist).  How is this treated?  Treatment for this condition depends on the cause or the underlying condition. Treatment may involve:  · Treating the underlying condition.  · Taking new medicines or changing your current medicines as told by your health care provider.  · Making changes to your diet or lifestyle.  Follow these instructions at home:  Lifestyle    · Do not use any products that contain nicotine or tobacco, such as cigarettes and e-cigarettes. If you need help quitting, ask your health care provider.  · Do not use illegal drugs, such as cocaine.  · Learn relaxation methods to help you when you get stressed or anxious. These include deep breathing.  · Avoid caffeine or other stimulants.  Alcohol use    · Do not drink alcohol if:  ? Your health care provider tells you not to drink.  ? You are pregnant, may be pregnant, or are planning to become pregnant.  · If you drink alcohol, limit how much you have:  ? 0-1 drink a day for women.  ? 0-2 drinks a day for men.  · Be aware of how much alcohol is in your drink. In the [place] one drink equals one typical bottle of beer (12 oz), one-half glass of wine (5 oz), or one shot of hard liquor (1½ oz).  General instructions  · Drink enough fluids to keep your urine pale yellow.  · Take  over-the-counter and prescription medicines only as told by your health care provider.  · Keep all follow-up visits as told by your health care provider. This is important.  Contact a health care provider if you have:  · A fever.  · Vomiting or diarrhea that does not go away.  Get help right away if you:  · Have pain in your chest, upper arms, jaw, or neck.  · Become weak or dizzy.  · Feel faint.  · Have palpitations that do not go away.  Summary  · In sinus tachycardia, the heart beats more than 100 times a minute.  · Sinus tachycardia may be harmless, or it may be a sign of a serious condition.  · Treatment for this condition depends on the cause or the underlying condition.  · Get help right away if you have pain in your chest, upper arms, jaw, or neck.  This information is not intended to replace advice given to you by your health care provider. Make sure you discuss any questions you have with your health care provider.  Document Released: 01/25/2006 Document Revised: 02/06/2019 Document Reviewed: 02/06/2019  Elsevier Patient Education © 2020 Else[employer] Inc.

## 2020-09-23 NOTE — PROGRESS NOTES
Subjective   Karyna Stewart is a 64 y.o. female     Chief Complaint   Patient presents with   • Follow-up     6 month follow up       HPI    PROBLEM LIST:     1. Chest pain   1.1 Stress Test 2/22/17 - mild lateral wall ischemia; preserved LVEF; positive study without high risk markers   1.2 LHC 3/26/18 - normal coronary arteries; normal right heart pressures, no pulmonary HTN  2. Shortness of breath   2.1 Echo 2/22/17 - mild LVH; EF 60-65%; DD II; mild MR, TR and DE; PA 20-25  3. Edema  4. Chronic Cough   5. Hypertension   6. Hyperlipidemia   7. Dizziness/ lightheadedness   8. Rheumatoid arthritis, lupus, scleroderma  9. Diabetes; diet controlled   10. IBS   11. GERD    Patient is a 64-year-old female who presents today for follow-up.  Patient says she has a little chest tightness on occasions when she is sitting.  She says she will get a little dizzy with it.  She says it goes across her chest.  She says it does not radiate.  She says she does not have any other symptoms other than dizziness when it occurs.  She does still have some racing of her heart.  She denies any presyncope, syncope, orthopnea or PND.  She does get swelling in her ankles and into her hands and she thinks some of it is from her arthritis.  She does still get short of breath with any activity.  She is doing somewhat better since she has been getting her allergy shots.  Patient is going to be having a colonoscopy and EGD.    Patient says her blood pressures been running good.  She says it was just 122/76 the other day.    Current Outpatient Medications on File Prior to Visit   Medication Sig Dispense Refill   • amLODIPine (NORVASC) 10 MG tablet Take 1 tablet by mouth Daily. 30 tablet 11   • aspirin 81 MG chewable tablet Chew 1 tablet Daily. 30 tablet 11   • dexlansoprazole (Dexilant) 30 MG capsule Take 30 mg by mouth Daily.     • DULoxetine (CYMBALTA) 30 MG capsule Take 30 mg by mouth Daily.     • isosorbide mononitrate (IMDUR) 30 MG 24 hr  tablet Take 1/2 (one-half) tablet by mouth once daily 45 tablet 3   • leflunomide (ARAVA) 10 MG tablet Take 10 mg by mouth Daily.     • levocetirizine (XYZAL) 5 MG tablet Take 5 mg by mouth Every Evening.     • meloxicam (MOBIC) 15 MG tablet Take 15 mg by mouth Daily.     • montelukast (SINGULAIR) 10 MG tablet Take 10 mg by mouth Every Night.     • nitroglycerin (NITROSTAT) 0.4 MG SL tablet 1 under the tongue as needed for angina, may repeat q5mins for up three doses 30 tablet 5   • predniSONE (DELTASONE) 5 MG tablet Take 5 mg by mouth Daily.     • RABEprazole (ACIPHEX) 20 MG EC tablet Take 1 tablet by mouth once daily 90 tablet 0   • sucralfate (CARAFATE) 1 g tablet 2 (Two) Times a Day.     • Tiotropium Bromide Monohydrate (SPIRIVA RESPIMAT IN) Inhale.     • triamterene-hydrochlorothiazide (DYAZIDE) 37.5-25 MG per capsule Take 1 capsule by mouth Every Morning.     • [DISCONTINUED] furosemide (LASIX) 20 MG tablet TAKE 1 TABLET BY MOUTH ONCE DAILY AS NEEDED FOR EDEMA 90 tablet 0   • [DISCONTINUED] losartan (Cozaar) 50 MG tablet Take 2 tablets by mouth Daily. 180 tablet 3   • [DISCONTINUED] potassium chloride (K-DUR,KLOR-CON) 20 MEQ CR tablet Take 1 tablet by mouth Daily. 90 tablet 3   • [DISCONTINUED] azelastine (ASTELIN) 0.1 % nasal spray 2 sprays into the nostril(s) as directed by provider 2 (Two) Times a Day. Use in each nostril as directed     • [DISCONTINUED] trospium 60 MG capsule sustained-release 24 hr capsule Take  by mouth Every Morning.     • [DISCONTINUED] vitamin D (ERGOCALCIFEROL) 1.25 MG (73740 UT) capsule capsule Take 50,000 Units by mouth 1 (One) Time Per Week.       No current facility-administered medications on file prior to visit.        ALLERGIES    Diovan [valsartan] and Iodine    Past Medical History:   Diagnosis Date   • Anxiety and depression    • Arthritis    • Chest pain    • Chronic cough    • Diabetes (CMS/HCC)    • Fibromyalgia    • GERD (gastroesophageal reflux disease)    • High  triglycerides    • Hypertension    • Irritable bowel syndrome    • Lupus (systemic lupus erythematosus) (CMS/HCC)    • Migraines    • Scleroderma (CMS/HCC)    • Seasonal allergies    • Ulcerative colitis (CMS/HCC)        Social History     Socioeconomic History   • Marital status:      Spouse name: Not on file   • Number of children: 0   • Years of education: Not on file   • Highest education level: Not on file   Occupational History   • Occupation:      Employer: [employer]   Tobacco Use   • Smoking status: Former Smoker     Types: Cigarettes     Quit date:      Years since quittin.7   • Smokeless tobacco: Never Used   • Tobacco comment: quit 15 yrs ago    Substance and Sexual Activity   • Alcohol use: No   • Drug use: No   • Sexual activity: Defer       Family History   Problem Relation Age of Onset   • COPD Mother    • Hypertension Mother    • Diverticulitis Father 45   • No Known Problems Son    • No Known Problems Daughter    • Stomach cancer Paternal Grandmother        Review of Systems   Constitutional: Positive for fatigue (fatigues easily). Negative for chills, diaphoresis and fever.   HENT: Positive for congestion. Negative for rhinorrhea and sore throat.    Eyes: Positive for visual disturbance (wears glasses daily).   Respiratory: Positive for cough (improving), chest tightness (little tightness will come on even with sitting, will get a little dizzy; across chest; no rad ) and shortness of breath (with any activity ). Negative for wheezing.    Cardiovascular: Positive for palpitations (racing ) and leg swelling (bilateral; left ankle stays and the right on is becoming more often; believe from arthritis ). Negative for chest pain.   Gastrointestinal: Positive for abdominal pain, nausea and vomiting. Negative for blood in stool.        Scheduled for EGD and colonoscopy on 10/14/2020   Endocrine: Negative for cold intolerance and heat intolerance.  "  Genitourinary: Positive for frequency and urgency. Negative for dysuria and hematuria.   Musculoskeletal: Positive for arthralgias (joints) and back pain (low back). Negative for neck pain.   Skin: Negative for rash and wound.   Allergic/Immunologic: Positive for environmental allergies (seasonal). Negative for food allergies.   Neurological: Positive for dizziness (With chest tightness). Negative for weakness and light-headedness.   Hematological: Bruises/bleeds easily (bruising).   Psychiatric/Behavioral: Negative for sleep disturbance (denies waking with smothering).       Objective   /96 (BP Location: Left arm, Patient Position: Sitting)   Pulse 118   Ht 152.4 cm (60\")   Wt 112 kg (247 lb)   SpO2 98%   BMI 48.24 kg/m²   Vitals:    09/23/20 0832   BP: 156/96   BP Location: Left arm   Patient Position: Sitting   Pulse: 118   SpO2: 98%   Weight: 112 kg (247 lb)   Height: 152.4 cm (60\")      Lab Results (most recent)     None        Physical Exam  Constitutional:       General: She is awake.      Appearance: Normal appearance. She is well-developed and well-groomed. She is morbidly obese.   HENT:      Head: Normocephalic.   Eyes:      General: Lids are normal.      Comments: Wears glasses   Neck:      Vascular: No carotid bruit, hepatojugular reflux or JVD.   Cardiovascular:      Rate and Rhythm: Regular rhythm. Tachycardia present.      Pulses:           Radial pulses are 2+ on the right side and 2+ on the left side.        Dorsalis pedis pulses are 2+ on the right side and 2+ on the left side.        Posterior tibial pulses are 2+ on the right side and 2+ on the left side.      Heart sounds: Normal heart sounds.   Pulmonary:      Effort: Pulmonary effort is normal.      Breath sounds: Normal breath sounds and air entry.   Abdominal:      General: Bowel sounds are normal.      Palpations: Abdomen is soft.   Musculoskeletal:      Right lower leg: No edema.      Left lower leg: No edema.      Comments: " Uses a cane    Skin:     General: Skin is warm and dry.   Neurological:      Mental Status: She is alert and oriented to person, place, and time.   Psychiatric:         Attention and Perception: Attention and perception normal.         Mood and Affect: Mood and affect normal.         Speech: Speech normal.         Behavior: Behavior normal. Behavior is cooperative.         Thought Content: Thought content normal.         Cognition and Memory: Cognition and memory normal.         Judgment: Judgment normal.         Procedure     ECG 12 Lead    Date/Time: 9/23/2020 1:14 PM  Performed by: Dinora Pitts APRN  Authorized by: Dinora Pitts APRN   Comparison: compared with previous ECG from 4/4/2019  Comparison to previous ECG: Previously sinus rhythm  Rhythm: sinus tachycardia  Rate: normal  BPM: 106  QRS axis: normal  Other findings: non-specific ST-T wave changes and low voltage    Clinical impression: abnormal EKG                 Assessment/Plan      Diagnosis Plan   1. Essential hypertension  losartan (Cozaar) 50 MG tablet    atenolol (TENORMIN) 25 MG tablet    Adult Transthoracic Echo Complete W/ Cont if Necessary Per Protocol    Comprehensive Metabolic Panel    ECG 12 Lead   2. Mixed hyperlipidemia  Lipid Panel   3. Shortness of breath  Adult Transthoracic Echo Complete W/ Cont if Necessary Per Protocol   4. Peripheral edema  furosemide (LASIX) 20 MG tablet   5. Grade II diastolic dysfunction  Adult Transthoracic Echo Complete W/ Cont if Necessary Per Protocol   6. Chest tightness  Adult Transthoracic Echo Complete W/ Cont if Necessary Per Protocol    ECG 12 Lead   7. Palpitations  atenolol (TENORMIN) 25 MG tablet    Adult Transthoracic Echo Complete W/ Cont if Necessary Per Protocol    Magnesium    ECG 12 Lead    Cardiac Event Monitor   8. Hypokalemia  potassium chloride (K-DUR,KLOR-CON) 20 MEQ CR tablet   9. Tachycardia  atenolol (TENORMIN) 25 MG tablet    Magnesium    ECG 12 Lead   10. Healthcare maintenance   Lipid Panel    Hemoglobin A1c    Comprehensive Metabolic Panel    Magnesium   11. Borderline diabetes mellitus  Hemoglobin A1c   12. Dizziness  Cardiac Event Monitor       Return in about 12 weeks (around 12/16/2020), or med change and test.    Hypertension/hyperlipidemia/shortness of breath/diastolic dysfunction 2/chest tightness/palpitations-patient will have an echocardiogram.  Palpitations/dizziness-she will wear an event monitor.  She will get lipid, hemoglobin A1c, CMP and magnesium.  She will continue her medication regimen for now.  She will follow-up in 12 weeks or sooner if any changes.       Karyna AUSTIN Stewart  reports that she quit smoking about 18 years ago. Her smoking use included cigarettes. She has never used smokeless tobacco.       Patient's Body mass index is 48.24 kg/m². BMI is above normal parameters. Recommendations include: educational material and referral to primary care.        Electronically signed by:

## 2020-09-24 ENCOUNTER — TELEPHONE (OUTPATIENT)
Dept: CARDIOLOGY | Facility: CLINIC | Age: 65
End: 2020-09-24

## 2020-09-24 DIAGNOSIS — R73.09 ELEVATED GLUCOSE: Primary | ICD-10-CM

## 2020-09-24 DIAGNOSIS — R94.39 ABNORMAL STRESS TEST: ICD-10-CM

## 2020-09-24 DIAGNOSIS — E87.6 HYPOKALEMIA: ICD-10-CM

## 2020-09-24 DIAGNOSIS — Z86.39 HISTORY OF LOW POTASSIUM: ICD-10-CM

## 2020-09-24 RX ORDER — POTASSIUM CHLORIDE 20 MEQ/1
40 TABLET, EXTENDED RELEASE ORAL DAILY
Qty: 180 TABLET | Refills: 3 | Status: SHIPPED | OUTPATIENT
Start: 2020-09-24 | End: 2020-11-02 | Stop reason: SDUPTHER

## 2020-09-24 RX ORDER — NITROGLYCERIN 0.4 MG/1
TABLET SUBLINGUAL
Qty: 25 TABLET | Refills: 0 | Status: SHIPPED | OUTPATIENT
Start: 2020-09-24

## 2020-09-24 NOTE — TELEPHONE ENCOUNTER
----- Message from Wale Lara MA sent at 9/24/2020  9:16 AM EDT -----    ----- Message -----  From: Dinora Pitts APRN  Sent: 9/24/2020   7:11 AM EDT  To: Wale Lara MA    Please advise patient she is increased risk for diabetes, monitor sugar and carbs.  Patient's , needs to watch red meat/fried food intake.  Her potassium is low and she is already on K once a day. Have her increase her K to 40 meq daily PO.  She will need her RX changed on med list so she has enough.   Have her get repeat BMP in 1 week.  I have forwarded labs to PCP.

## 2020-10-01 ENCOUNTER — LAB (OUTPATIENT)
Dept: LAB | Facility: HOSPITAL | Age: 65
End: 2020-10-01

## 2020-10-01 DIAGNOSIS — E87.6 HYPOKALEMIA: ICD-10-CM

## 2020-10-01 DIAGNOSIS — R73.09 ELEVATED GLUCOSE: ICD-10-CM

## 2020-10-01 DIAGNOSIS — Z86.39 HISTORY OF LOW POTASSIUM: ICD-10-CM

## 2020-10-01 LAB
ANION GAP SERPL CALCULATED.3IONS-SCNC: 14.2 MMOL/L (ref 5–15)
BUN SERPL-MCNC: 15 MG/DL (ref 8–23)
BUN/CREAT SERPL: 21.4 (ref 7–25)
CALCIUM SPEC-SCNC: 9 MG/DL (ref 8.6–10.5)
CHLORIDE SERPL-SCNC: 105 MMOL/L (ref 98–107)
CO2 SERPL-SCNC: 24.8 MMOL/L (ref 22–29)
CREAT SERPL-MCNC: 0.7 MG/DL (ref 0.57–1)
GFR SERPL CREATININE-BSD FRML MDRD: 102 ML/MIN/1.73
GLUCOSE SERPL-MCNC: 88 MG/DL (ref 65–99)
POTASSIUM SERPL-SCNC: 3.6 MMOL/L (ref 3.5–5.2)
SODIUM SERPL-SCNC: 144 MMOL/L (ref 136–145)

## 2020-10-01 PROCEDURE — 80048 BASIC METABOLIC PNL TOTAL CA: CPT | Performed by: NURSE PRACTITIONER

## 2020-10-01 PROCEDURE — 36415 COLL VENOUS BLD VENIPUNCTURE: CPT

## 2020-10-02 ENCOUNTER — TELEPHONE (OUTPATIENT)
Dept: CARDIOLOGY | Facility: CLINIC | Age: 65
End: 2020-10-02

## 2020-10-02 NOTE — TELEPHONE ENCOUNTER
----- Message from KALA August sent at 10/2/2020  6:45 AM EDT -----  Patient's labs are back to normal range specifically potassium.

## 2020-10-14 ENCOUNTER — OUTSIDE FACILITY SERVICE (OUTPATIENT)
Dept: CARDIOLOGY | Facility: CLINIC | Age: 65
End: 2020-10-14

## 2020-10-14 PROCEDURE — 93228 REMOTE 30 DAY ECG REV/REPORT: CPT | Performed by: INTERNAL MEDICINE

## 2020-10-22 DIAGNOSIS — K21.9 GASTROESOPHAGEAL REFLUX DISEASE: ICD-10-CM

## 2020-10-22 RX ORDER — RABEPRAZOLE SODIUM 20 MG/1
TABLET, DELAYED RELEASE ORAL
Qty: 90 TABLET | Refills: 0 | Status: SHIPPED | OUTPATIENT
Start: 2020-10-22 | End: 2020-12-15 | Stop reason: SDUPTHER

## 2020-10-22 RX ORDER — RABEPRAZOLE SODIUM 20 MG/1
TABLET, DELAYED RELEASE ORAL
Qty: 90 TABLET | Refills: 0 | Status: SHIPPED | OUTPATIENT
Start: 2020-10-22 | End: 2020-12-15

## 2020-11-02 ENCOUNTER — HOSPITAL ENCOUNTER (OUTPATIENT)
Dept: CARDIOLOGY | Facility: HOSPITAL | Age: 65
Discharge: HOME | End: 2020-11-02
Admitting: NURSE PRACTITIONER

## 2020-11-02 DIAGNOSIS — I51.89 GRADE II DIASTOLIC DYSFUNCTION: ICD-10-CM

## 2020-11-02 DIAGNOSIS — R07.89 CHEST TIGHTNESS: ICD-10-CM

## 2020-11-02 DIAGNOSIS — Z86.39 HISTORY OF LOW POTASSIUM: ICD-10-CM

## 2020-11-02 DIAGNOSIS — R00.2 PALPITATIONS: ICD-10-CM

## 2020-11-02 DIAGNOSIS — E87.6 HYPOKALEMIA: ICD-10-CM

## 2020-11-02 DIAGNOSIS — R73.09 ELEVATED GLUCOSE: ICD-10-CM

## 2020-11-02 DIAGNOSIS — R06.02 SHORTNESS OF BREATH: ICD-10-CM

## 2020-11-02 DIAGNOSIS — I10 ESSENTIAL HYPERTENSION: ICD-10-CM

## 2020-11-02 PROCEDURE — 93306 TTE W/DOPPLER COMPLETE: CPT

## 2020-11-02 PROCEDURE — 93306 TTE W/DOPPLER COMPLETE: CPT | Performed by: INTERNAL MEDICINE

## 2020-11-02 RX ORDER — POTASSIUM CHLORIDE 20 MEQ/1
40 TABLET, EXTENDED RELEASE ORAL DAILY
Qty: 180 TABLET | Refills: 3 | Status: SHIPPED | OUTPATIENT
Start: 2020-11-02 | End: 2021-12-22

## 2020-11-02 NOTE — TELEPHONE ENCOUNTER
Refilled. MS,CMA    ----- Message from Charla Turner sent at 11/2/2020  7:59 AM EST -----  Regarding: MED REFILLS  MEDICATION NAME:  potassium chloride (K-DUR,KLOR-CON) 20 MEQ CR tablet  PHARMACY:  [facility]

## 2020-11-09 ENCOUNTER — TELEPHONE (OUTPATIENT)
Dept: CARDIOLOGY | Facility: CLINIC | Age: 65
End: 2020-11-09

## 2020-11-09 LAB
BH CV ECHO MEAS - ACS: 2 CM
BH CV ECHO MEAS - AO MAX PG: 6.3 MMHG
BH CV ECHO MEAS - AO MEAN PG: 4 MMHG
BH CV ECHO MEAS - AO ROOT AREA (BSA CORRECTED): 1.4
BH CV ECHO MEAS - AO ROOT AREA: 6.2 CM^2
BH CV ECHO MEAS - AO ROOT DIAM: 2.8 CM
BH CV ECHO MEAS - AO V2 MAX: 125 CM/SEC
BH CV ECHO MEAS - AO V2 MEAN: 96.3 CM/SEC
BH CV ECHO MEAS - AO V2 VTI: 29.3 CM
BH CV ECHO MEAS - BSA(HAYCOCK): 2.3 M^2
BH CV ECHO MEAS - BSA: 2 M^2
BH CV ECHO MEAS - BZI_BMI: 48.2 KILOGRAMS/M^2
BH CV ECHO MEAS - BZI_METRIC_HEIGHT: 152.4 CM
BH CV ECHO MEAS - BZI_METRIC_WEIGHT: 112 KG
BH CV ECHO MEAS - EDV(CUBED): 19.9 ML
BH CV ECHO MEAS - EDV(MOD-SP4): 83.2 ML
BH CV ECHO MEAS - EDV(TEICH): 27.3 ML
BH CV ECHO MEAS - EF(CUBED): 75.7 %
BH CV ECHO MEAS - EF(MOD-SP4): 47.2 %
BH CV ECHO MEAS - EF(TEICH): 69.7 %
BH CV ECHO MEAS - ESV(CUBED): 4.8 ML
BH CV ECHO MEAS - ESV(MOD-SP4): 43.9 ML
BH CV ECHO MEAS - ESV(TEICH): 8.3 ML
BH CV ECHO MEAS - FS: 37.6 %
BH CV ECHO MEAS - IVS/LVPW: 1
BH CV ECHO MEAS - IVSD: 1.7 CM
BH CV ECHO MEAS - LA DIMENSION: 3.6 CM
BH CV ECHO MEAS - LA/AO: 1.3
BH CV ECHO MEAS - LV DIASTOLIC VOL/BSA (35-75): 40.7 ML/M^2
BH CV ECHO MEAS - LV IVRT: 0.1 SEC
BH CV ECHO MEAS - LV MASS(C)D: 167.4 GRAMS
BH CV ECHO MEAS - LV MASS(C)DI: 82 GRAMS/M^2
BH CV ECHO MEAS - LV SYSTOLIC VOL/BSA (12-30): 21.5 ML/M^2
BH CV ECHO MEAS - LVIDD: 2.7 CM
BH CV ECHO MEAS - LVIDS: 1.7 CM
BH CV ECHO MEAS - LVLD AP4: 7.4 CM
BH CV ECHO MEAS - LVLS AP4: 6.7 CM
BH CV ECHO MEAS - LVOT AREA (M): 3.5 CM^2
BH CV ECHO MEAS - LVOT AREA: 3.5 CM^2
BH CV ECHO MEAS - LVOT DIAM: 2.1 CM
BH CV ECHO MEAS - LVPWD: 1.7 CM
BH CV ECHO MEAS - MV A MAX VEL: 80.1 CM/SEC
BH CV ECHO MEAS - MV DEC SLOPE: 244 CM/SEC^2
BH CV ECHO MEAS - MV E MAX VEL: 67.3 CM/SEC
BH CV ECHO MEAS - MV E/A: 0.84
BH CV ECHO MEAS - RVDD: 3.2 CM
BH CV ECHO MEAS - SI(AO): 88.4 ML/M^2
BH CV ECHO MEAS - SI(CUBED): 7.4 ML/M^2
BH CV ECHO MEAS - SI(MOD-SP4): 19.2 ML/M^2
BH CV ECHO MEAS - SI(TEICH): 9.3 ML/M^2
BH CV ECHO MEAS - SV(AO): 180.4 ML
BH CV ECHO MEAS - SV(CUBED): 15.1 ML
BH CV ECHO MEAS - SV(MOD-SP4): 39.3 ML
BH CV ECHO MEAS - SV(TEICH): 19 ML
MAXIMAL PREDICTED HEART RATE: 156 BPM
STRESS TARGET HR: 133 BPM

## 2020-11-09 NOTE — TELEPHONE ENCOUNTER
Patient notified of results. Will keep follow up as scheduled and call or come in sooner as needed.  BERTHA HAMILTON    Follow up on 12/15/20 at 2:15pm     ----- Message from KALA August sent at 11/9/2020  9:16 AM EST -----  Please advise patient      Echo:  1.  LV size, function, and wall motion are normal.  Visually estimated ejection fraction is 60 to 65%.  Mild to moderate concentric left ventricular hypertrophy with mild biatrial enlargement.  The right ventricle appears to be at the upper limits of normal size to mildly dilated but RV systolic function appears grossly preserved.     2.  Valves are morphologically and physiologically normal with no stenotic lesions, valve associated masses or thrombi, or significant valvular regurgitation.     3.  No pericardial or great vessel pathology.     4.  Pulmonary artery pressures cannot be calculated.

## 2020-12-15 ENCOUNTER — OFFICE VISIT (OUTPATIENT)
Dept: CARDIOLOGY | Facility: CLINIC | Age: 65
End: 2020-12-15

## 2020-12-15 ENCOUNTER — LAB (OUTPATIENT)
Dept: CARDIOLOGY | Facility: CLINIC | Age: 65
End: 2020-12-15

## 2020-12-15 VITALS
DIASTOLIC BLOOD PRESSURE: 66 MMHG | HEART RATE: 79 BPM | SYSTOLIC BLOOD PRESSURE: 112 MMHG | BODY MASS INDEX: 47.71 KG/M2 | OXYGEN SATURATION: 94 % | WEIGHT: 243 LBS | HEIGHT: 60 IN

## 2020-12-15 DIAGNOSIS — R06.02 SHORTNESS OF BREATH: ICD-10-CM

## 2020-12-15 DIAGNOSIS — K21.9 GASTROESOPHAGEAL REFLUX DISEASE: ICD-10-CM

## 2020-12-15 DIAGNOSIS — Z00.00 HEALTHCARE MAINTENANCE: ICD-10-CM

## 2020-12-15 DIAGNOSIS — R00.2 PALPITATIONS: ICD-10-CM

## 2020-12-15 DIAGNOSIS — R00.0 TACHYCARDIA: ICD-10-CM

## 2020-12-15 DIAGNOSIS — I10 ESSENTIAL HYPERTENSION: ICD-10-CM

## 2020-12-15 DIAGNOSIS — I51.89 GRADE II DIASTOLIC DYSFUNCTION: ICD-10-CM

## 2020-12-15 DIAGNOSIS — R60.9 PERIPHERAL EDEMA: ICD-10-CM

## 2020-12-15 DIAGNOSIS — I27.20 PULMONARY HYPERTENSION (HCC): ICD-10-CM

## 2020-12-15 DIAGNOSIS — I10 ESSENTIAL HYPERTENSION: Primary | ICD-10-CM

## 2020-12-15 DIAGNOSIS — E78.2 MIXED HYPERLIPIDEMIA: ICD-10-CM

## 2020-12-15 LAB
ANION GAP SERPL CALCULATED.3IONS-SCNC: 11.6 MMOL/L (ref 5–15)
BUN SERPL-MCNC: 14 MG/DL (ref 8–23)
BUN/CREAT SERPL: 20 (ref 7–25)
CALCIUM SPEC-SCNC: 9.3 MG/DL (ref 8.6–10.5)
CHLORIDE SERPL-SCNC: 103 MMOL/L (ref 98–107)
CO2 SERPL-SCNC: 25.4 MMOL/L (ref 22–29)
CREAT SERPL-MCNC: 0.7 MG/DL (ref 0.57–1)
GFR SERPL CREATININE-BSD FRML MDRD: 102 ML/MIN/1.73
GLUCOSE SERPL-MCNC: 102 MG/DL (ref 65–99)
MAGNESIUM SERPL-MCNC: 2.1 MG/DL (ref 1.6–2.4)
POTASSIUM SERPL-SCNC: 3.6 MMOL/L (ref 3.5–5.2)
SODIUM SERPL-SCNC: 140 MMOL/L (ref 136–145)

## 2020-12-15 PROCEDURE — 83735 ASSAY OF MAGNESIUM: CPT | Performed by: NURSE PRACTITIONER

## 2020-12-15 PROCEDURE — 36415 COLL VENOUS BLD VENIPUNCTURE: CPT

## 2020-12-15 PROCEDURE — 93000 ELECTROCARDIOGRAM COMPLETE: CPT | Performed by: NURSE PRACTITIONER

## 2020-12-15 PROCEDURE — 99213 OFFICE O/P EST LOW 20 MIN: CPT | Performed by: NURSE PRACTITIONER

## 2020-12-15 PROCEDURE — 80048 BASIC METABOLIC PNL TOTAL CA: CPT | Performed by: NURSE PRACTITIONER

## 2020-12-15 RX ORDER — RABEPRAZOLE SODIUM 20 MG/1
20 TABLET, DELAYED RELEASE ORAL DAILY
Qty: 90 TABLET | Refills: 3 | Status: SHIPPED | OUTPATIENT
Start: 2020-12-15 | End: 2021-06-17

## 2020-12-15 RX ORDER — FUROSEMIDE 20 MG/1
20 TABLET ORAL DAILY
Qty: 90 TABLET | Refills: 3 | Status: SHIPPED | OUTPATIENT
Start: 2020-12-15 | End: 2020-12-15 | Stop reason: ALTCHOICE

## 2020-12-15 RX ORDER — TORSEMIDE 20 MG/1
20 TABLET ORAL DAILY PRN
Qty: 90 TABLET | Refills: 3 | Status: SHIPPED | OUTPATIENT
Start: 2020-12-15 | End: 2021-12-22 | Stop reason: SDUPTHER

## 2020-12-15 NOTE — PATIENT INSTRUCTIONS
"Fat and Cholesterol Restricted Eating Plan  Getting too much fat and cholesterol in your diet may cause health problems. Choosing the right foods helps keep your fat and cholesterol at normal levels. This can keep you from getting certain diseases.  Your doctor may recommend an eating plan that includes:  · Total fat: ______% or less of total calories a day.  · Saturated fat: ______% or less of total calories a day.  · Cholesterol: less than _________mg a day.  · Fiber: ______g a day.  What are tips for following this plan?  Meal planning  · At meals, divide your plate into four equal parts:  ? Fill one-half of your plate with vegetables and green salads.  ? Fill one-fourth of your plate with whole grains.  ? Fill one-fourth of your plate with low-fat (lean) protein foods.  · Eat fish that is high in omega-3 fats at least two times a week. This includes mackerel, tuna, sardines, and salmon.  · Eat foods that are high in fiber, such as whole grains, beans, apples, broccoli, carrots, peas, and barley.  General tips    · Work with your doctor to lose weight if you need to.  · Avoid:  ? Foods with added sugar.  ? Fried foods.  ? Foods with partially hydrogenated oils.  · Limit alcohol intake to no more than 1 drink a day for nonpregnant women and 2 drinks a day for men. One drink equals 12 oz of beer, 5 oz of wine, or 1½ oz of hard liquor.  Reading food labels  · Check food labels for:  ? Trans fats.  ? Partially hydrogenated oils.  ? Saturated fat (g) in each serving.  ? Cholesterol (mg) in each serving.  ? Fiber (g) in each serving.  · Choose foods with healthy fats, such as:  ? Monounsaturated fats.  ? Polyunsaturated fats.  ? Omega-3 fats.  · Choose grain products that have whole grains. Look for the word \"whole\" as the first word in the ingredient list.  Cooking  · Cook foods using low-fat methods. These include baking, boiling, grilling, and broiling.  · Eat more home-cooked foods. Eat at restaurants and buffets " less often.  · Avoid cooking using saturated fats, such as butter, cream, palm oil, palm kernel oil, and coconut oil.  Recommended foods    Fruits  · All fresh, canned (in natural juice), or frozen fruits.  Vegetables  · Fresh or frozen vegetables (raw, steamed, roasted, or grilled). Green salads.  Grains  · Whole grains, such as whole wheat or whole grain breads, crackers, cereals, and pasta. Unsweetened oatmeal, bulgur, barley, quinoa, or brown rice. Corn or whole wheat flour tortillas.  Meats and other protein foods  · Ground beef (85% or leaner), grass-fed beef, or beef trimmed of fat. Skinless chicken or turkey. Ground chicken or turkey. Pork trimmed of fat. All fish and seafood. Egg whites. Dried beans, peas, or lentils. Unsalted nuts or seeds. Unsalted canned beans. Nut butters without added sugar or oil.  Dairy  · Low-fat or nonfat dairy products, such as skim or 1% milk, 2% or reduced-fat cheeses, low-fat and fat-free ricotta or cottage cheese, or plain low-fat and nonfat yogurt.  Fats and oils  · Tub margarine without trans fats. Light or reduced-fat mayonnaise and salad dressings. Avocado. Olive, canola, sesame, or safflower oils.  The items listed above may not be a complete list of foods and beverages you can eat. Contact a dietitian for more information.  Foods to avoid  Fruits  · Canned fruit in heavy syrup. Fruit in cream or butter sauce. Fried fruit.  Vegetables  · Vegetables cooked in cheese, cream, or butter sauce. Fried vegetables.  Grains  · White bread. White pasta. White rice. Cornbread. Bagels, pastries, and croissants. Crackers and snack foods that contain trans fat and hydrogenated oils.  Meats and other protein foods  · Fatty cuts of meat. Ribs, chicken wings, [place], sausage, bologna, salami, chitterlings, fatback, hot dogs, bratwurst, and packaged lunch meats. Liver and organ meats. Whole eggs and egg yolks. Chicken and turkey with skin. Fried meat.  Dairy  · Whole or 2% milk, cream,  half-and-half, and cream cheese. Whole milk cheeses. Whole-fat or sweetened yogurt. Full-fat cheeses. Nondairy creamers and whipped toppings. Processed cheese, cheese spreads, and cheese curds.  Beverages  · Alcohol. Sugar-sweetened drinks such as sodas, lemonade, and fruit drinks.  Fats and oils  · Butter, stick margarine, lard, shortening, ghee, or [place] fat. Coconut, palm kernel, and palm oils.  Sweets and desserts  · Corn syrup, sugars, honey, and molasses. Candy. Jam and jelly. Syrup. Sweetened cereals. Cookies, pies, cakes, donuts, muffins, and ice cream.  The items listed above may not be a complete list of foods and beverages you should avoid. Contact a dietitian for more information.  Summary  · Choosing the right foods helps keep your fat and cholesterol at normal levels. This can keep you from getting certain diseases.  · At meals, fill one-half of your plate with vegetables and green salads.  · Eat high-fiber foods, like whole grains, beans, apples, carrots, peas, and barley.  · Limit added sugar, saturated fats, alcohol, and fried foods.  This information is not intended to replace advice given to you by your health care provider. Make sure you discuss any questions you have with your health care provider.  Document Revised: 08/21/2019 Document Reviewed: 09/04/2018  [employer] Patient Education © 2020 [employer]  BMI for Adults  What is BMI?  Body mass index (BMI) is a number that is calculated from a person's weight and height. BMI can help estimate how much of a person's weight is composed of fat. BMI does not measure body fat directly. Rather, it is an alternative to procedures that directly measure body fat, which can be difficult and expensive.  BMI can help identify people who may be at higher risk for certain medical problems.  What are BMI measurements used for?  BMI is used as a screening tool to identify possible weight problems. It helps determine whether a person is obese, overweight, a  "healthy weight, or underweight.  BMI is useful for:  · Identifying a weight problem that may be related to a medical condition or may increase the risk for medical problems.  · Promoting changes, such as changes in diet and exercise, to help reach a healthy weight. BMI screening can be repeated to see if these changes are working.  How is BMI calculated?  BMI involves measuring your weight in relation to your height. Both height and weight are measured, and the BMI is calculated from those numbers. This can be done either in English (U[place] or metric measurements. Note that charts and online BMI calculators are available to help you find your BMI quickly and easily without having to do these calculations yourself.  To calculate your BMI in English (U[place] measurements:    1. Measure your weight in pounds (lb).  2. Multiply the number of pounds by 703.  ? For example, for a person who weighs 180 lb, multiply that number by 703, which equals 126,540.  3. Measure your height in inches. Then multiply that number by itself to get a measurement called \"inches squared.\"  ? For example, for a person who is 70 inches tall, the \"inches squared\" measurement is 70 inches x 70 inches, which equals 4,900 inches squared.  4. Divide the total from step 2 (number of lb x 703) by the total from step 3 (inches squared): 126,540 ÷ 4,900 = 25.8. This is your BMI.  To calculate your BMI in metric measurements:  1. Measure your weight in kilograms (kg).  2. Measure your height in meters (m). Then multiply that number by itself to get a measurement called \"meters squared.\"  ? For example, for a person who is 1.75 m tall, the \"meters squared\" measurement is 1.75 m x 1.75 m, which is equal to 3.1 meters squared.  3. Divide the number of kilograms (your weight) by the meters squared number. In this example: 70 ÷ 3.1 = 22.6. This is your BMI.  What do the results mean?  BMI charts are used to identify whether you are underweight, normal weight, " overweight, or obese. The following guidelines will be used:  · Underweight: BMI less than 18.5.  · Normal weight: BMI between 18.5 and 24.9.  · Overweight: BMI between 25 and 29.9.  · Obese: BMI of 30 or above.  Keep these notes in mind:  · Weight includes both fat and muscle, so someone with a muscular build, such as an athlete, may have a BMI that is higher than 24.9. In cases like these, BMI is not an accurate measure of body fat.  · To determine if excess body fat is the cause of a BMI of 25 or higher, further assessments may need to be done by a health care provider.  · BMI is usually interpreted in the same way for men and women.  Where to find more information  For more information about BMI, including tools to quickly calculate your BMI, go to these websites:  · Centers for Disease Control and Prevention: www.cdc.gov  · American Heart Association: www.heart.org  · National Heart, Lung, and Blood [facility]: www.nhlbi.nih.gov  Summary  · Body mass index (BMI) is a number that is calculated from a person's weight and height.  · BMI may help estimate how much of a person's weight is composed of fat. BMI can help identify those who may be at higher risk for certain medical problems.  · BMI can be measured using English measurements or metric measurements.  · BMI charts are used to identify whether you are underweight, normal weight, overweight, or obese.  This information is not intended to replace advice given to you by your health care provider. Make sure you discuss any questions you have with your health care provider.  Document Revised: 09/09/2020 Document Reviewed: 07/17/2020  [employer] Patient Education © 2020 [employer]

## 2020-12-15 NOTE — PROGRESS NOTES
Subjective   Karyna Stewart is a 65 y.o. female     Chief Complaint   Patient presents with   • Follow-up   • Hypertension       HPI    PROBLEM LIST:     1. Chest pain   1.1 Stress Test 2/22/17 - mild lateral wall ischemia; preserved LVEF; positive study without high risk markers   1.2 LHC 3/26/18 - normal coronary arteries; normal right heart pressures, no pulmonary HTN  2. Shortness of breath   2.1 Echo 2/22/17 - mild LVH; EF 60-65%; DD II; mild MR, TR and MS; PA 20-25  2.2 echo 11/2/2020-EF 60 to 65%, mild to moderate LVH  3. Edema  4. Chronic Cough   5. Hypertension   6. Hyperlipidemia   7. Dizziness/ lightheadedness   8. Rheumatoid arthritis, lupus, scleroderma  9. Diabetes; diet controlled   10. IBS   11. GERD  12.  Palpitations  12.1 event monitor 10/1-10/14/2020-normal sinus rhythm, sinus arrhythmia    Patient is a 65-year-old female who presents today for follow-up on testing.  Patient says she does have some chest discomfort but is due to gastroenteritis.  She says otherwise she does absolutely fine.  She has not had any further palpitations, presyncope, syncope, orthopnea or PND.  Patient says she does have dizziness which is associated with her headaches.  She says that she does have swelling in both ankles and uses her water pill daily but does not feel like it is very effective.  She says she does have shortness of breath but this has actually gotten a lot better.  She is fatigued all the time due to her autoimmune disorder.    We went over echo and event monitor.  We also went over her labs.  Her LDL was 120.  Her potassium was 3.1 and she has not had this rechecked.  Her hemoglobin A1c was 6.10.    Current Outpatient Medications on File Prior to Visit   Medication Sig Dispense Refill   • amLODIPine (NORVASC) 10 MG tablet Take 1 tablet by mouth Daily. 30 tablet 11   • aspirin 81 MG chewable tablet Chew 1 tablet Daily. 30 tablet 11   • atenolol (TENORMIN) 25 MG tablet Take 1 tablet by mouth Daily.  90 tablet 3   • dexlansoprazole (Dexilant) 30 MG capsule Take 30 mg by mouth Daily.     • DULoxetine (CYMBALTA) 30 MG capsule Take 30 mg by mouth Daily.     • isosorbide mononitrate (IMDUR) 30 MG 24 hr tablet Take 1/2 (one-half) tablet by mouth once daily 45 tablet 3   • leflunomide (ARAVA) 10 MG tablet Take 10 mg by mouth Daily.     • levocetirizine (XYZAL) 5 MG tablet Take 5 mg by mouth Every Evening.     • losartan (Cozaar) 50 MG tablet Take 1 tablet by mouth Daily. 90 tablet 3   • meloxicam (MOBIC) 15 MG tablet Take 15 mg by mouth Daily.     • montelukast (SINGULAIR) 10 MG tablet Take 10 mg by mouth Every Night.     • nitroglycerin (NITROSTAT) 0.4 MG SL tablet PLACE ONE TABLET UNDER THE TONGUE AS NEEDED FOR ANGINA MAY REPEAT EVERY 5 MINUTES FOR UP TO THREE DOSES 25 tablet 0   • potassium chloride (K-DUR,KLOR-CON) 20 MEQ CR tablet Take 2 tablets by mouth Daily. 180 tablet 3   • predniSONE (DELTASONE) 5 MG tablet Take 5 mg by mouth Daily.     • sucralfate (CARAFATE) 1 g tablet 2 (Two) Times a Day.     • Tiotropium Bromide Monohydrate (SPIRIVA RESPIMAT IN) Inhale.     • triamterene-hydrochlorothiazide (DYAZIDE) 37.5-25 MG per capsule Take 1 capsule by mouth Every Morning.       No current facility-administered medications on file prior to visit.        ALLERGIES    Diovan [valsartan] and Iodine    Past Medical History:   Diagnosis Date   • Anxiety and depression    • Arthritis    • Asthma    • Chest pain    • Chronic cough    • Diabetes (CMS/HCC)    • Fibromyalgia    • GERD (gastroesophageal reflux disease)    • High triglycerides    • Hypertension    • Irritable bowel syndrome    • Lupus (systemic lupus erythematosus) (CMS/HCC)    • Migraines    • Scleroderma (CMS/HCC)    • Seasonal allergies    • Ulcerative colitis (CMS/HCC)        Social History     Socioeconomic History   • Marital status:      Spouse name: Not on file   • Number of children: 0   • Years of education: Not on file   • Highest education  level: Not on file   Occupational History   • Occupation:      [employer]er: [employer]   Tobacco Use   • Smoking status: Former Smoker     Types: Cigarettes     Quit date:      Years since quittin.9   • Smokeless tobacco: Never Used   • Tobacco comment: quit 15 yrs ago    Substance and Sexual Activity   • Alcohol use: No   • Drug use: No   • Sexual activity: Defer       Family History   Problem Relation Age of Onset   • COPD Mother    • Hypertension Mother    • Diverticulitis Father 45   • No Known Problems Son    • No Known Problems Daughter    • Stomach cancer Paternal Grandmother        Review of Systems   Constitutional: Positive for fatigue (due to autoimmune ). Negative for chills and fever.   HENT: Negative for congestion, rhinorrhea and sore throat.    Eyes: Positive for visual disturbance (glasses).   Respiratory: Positive for chest tightness (some at least every other day) and shortness of breath (pt states that its alot better). Negative for cough and wheezing.    Cardiovascular: Positive for chest pain (do to gastroititis) and leg swelling (both ankles; uses water pill daily ). Negative for palpitations.   Gastrointestinal: Positive for abdominal pain (twist cramps ache) and diarrhea (sometimes and lose stools ). Negative for blood in stool, constipation, nausea and vomiting.   Endocrine: Positive for cold intolerance (chills every now and then due to H/A). Negative for heat intolerance.   Genitourinary: Positive for frequency (pt being treated and goes less now). Negative for dysuria, hematuria and urgency.   Musculoskeletal: Positive for gait problem (uses a cane).   Allergic/Immunologic: Positive for environmental allergies (pt states all seasonal ). Negative for food allergies.   Neurological: Positive for dizziness (assoc with H/A), weakness (hands and legs trouble holding thingand legs shack from time to time), light-headedness (Assoc with H/a), numbness (right  "leg knee and buttock and uper thigh) and headaches (dx with these for years).   Hematological: Bruises/bleeds easily (started Bruises).   Psychiatric/Behavioral: Positive for sleep disturbance (all the time doesnt sleep well).       Objective   /66   Pulse 79   Ht 152.4 cm (60\")   Wt 110 kg (243 lb)   SpO2 94%   BMI 47.46 kg/m²   Vitals:    12/15/20 1425   BP: 112/66   Pulse: 79   SpO2: 94%   Weight: 110 kg (243 lb)   Height: 152.4 cm (60\")      Lab Results (most recent)     None        Physical Exam  Vitals signs reviewed.   Constitutional:       General: She is awake.      Appearance: Normal appearance. She is well-developed and well-groomed. She is morbidly obese.   HENT:      Head: Normocephalic.   Eyes:      General: Lids are normal.      Comments: Wears glasses    Neck:      Vascular: No carotid bruit, hepatojugular reflux or JVD.   Cardiovascular:      Rate and Rhythm: Normal rate and regular rhythm.      Pulses:           Radial pulses are 2+ on the right side and 2+ on the left side.        Dorsalis pedis pulses are 2+ on the right side and 2+ on the left side.        Posterior tibial pulses are 2+ on the right side and 2+ on the left side.      Heart sounds: Normal heart sounds.   Pulmonary:      Effort: Pulmonary effort is normal.      Breath sounds: Normal breath sounds and air entry.   Abdominal:      General: Bowel sounds are normal.      Palpations: Abdomen is soft.   Musculoskeletal:      Right lower leg: Edema (trace ) present.      Left lower leg: Edema (trace) present.      Comments: Uses a cane    Skin:     General: Skin is warm and dry.   Neurological:      Mental Status: She is alert and oriented to person, place, and time.   Psychiatric:         Attention and Perception: Attention and perception normal.         Mood and Affect: Mood and affect normal.         Speech: Speech normal.         Behavior: Behavior normal. Behavior is cooperative.         Thought Content: Thought content " normal.         Cognition and Memory: Cognition and memory normal.         Judgment: Judgment normal.         Procedure     ECG 12 Lead    Date/Time: 12/15/2020 4:43 PM  Performed by: Dinora Pitts APRN  Authorized by: Dinora Pitts APRN   Comparison: compared with previous ECG from 9/23/2020  Comparison to previous ECG: Previous EKG was tachycardic  Rhythm: sinus rhythm  Rate: normal  BPM: 66  QRS axis: normal  Other findings: low voltage    Clinical impression: non-specific ECG                 Assessment/Plan      Diagnosis Plan   1. Essential hypertension  Basic Metabolic Panel    ECG 12 Lead   2. Mixed hyperlipidemia     3. Tachycardia  ECG 12 Lead   4. Grade II diastolic dysfunction  torsemide (DEMADEX) 20 MG tablet   5. Shortness of breath     6. Peripheral edema  torsemide (DEMADEX) 20 MG tablet   7. Pulmonary hypertension (CMS/HCC)     8. Gastroesophageal reflux disease  RABEprazole (ACIPHEX) 20 MG EC tablet   9. Healthcare maintenance  Basic Metabolic Panel    Magnesium   10. Palpitations  Magnesium    ECG 12 Lead       Return in about 6 months (around 6/15/2021).  Hypertension-patient's doing well with amlodipine, losartan, beta and Dyazide.  Hyperlipidemia-patient is diet controlled only at this time.  Diastolic dysfunction 2/peripheral edema-patient will stop Lasix and start torsemide and she will continue her Dyazide.  Shortness of breath-stable.  Pulmonary hypertension-stable.  Palpitations/tachycardia-stable on beta-blocker.  Gerd - Refilled aciphex.  Patient is going get a repeat BMP and magnesium.  She will continue her medication regimen with above-noted change.  She will follow-up in 6 months or sooner if any changes.         Karyna Stewart  reports that she quit smoking about 18 years ago. Her smoking use included cigarettes. She has never used smokeless tobacco.  .     Patient's Body mass index is 47.46 kg/m². BMI is above normal parameters. Recommendations include: educational  material   Advance Care Planning   ACP discussion was held with the patient during this visit. Patient does not have an advance directive, declines further assistance..      Electronically signed by:

## 2020-12-16 ENCOUNTER — TELEPHONE (OUTPATIENT)
Dept: CARDIOLOGY | Facility: CLINIC | Age: 65
End: 2020-12-16

## 2020-12-16 NOTE — TELEPHONE ENCOUNTER
----- Message from Nicole King sent at 12/16/2020  8:39 AM EST -----    ----- Message -----  From: Dinora Pitts APRN  Sent: 12/16/2020   6:35 AM EST  To: Nicole King    Please advise patient.

## 2020-12-16 NOTE — TELEPHONE ENCOUNTER
Cardio Labs are within normal range.    Advised Pt that Cardio Labs are w/in normal rang.     CM/RMA

## 2020-12-16 NOTE — TELEPHONE ENCOUNTER
Pt LVM expressing she has some questions regarding recent medication changes.  Tried calling pt back to address her concerns.  No answer.  LVM to ret call.       Pt returned call.  Verified furosemide was discontinued on last office visit and pt was given rx for torsemide 20 mg daily as needed per KALA Perez.

## 2021-06-17 ENCOUNTER — OFFICE VISIT (OUTPATIENT)
Dept: CARDIOLOGY | Facility: CLINIC | Age: 66
End: 2021-06-17

## 2021-06-17 VITALS
OXYGEN SATURATION: 95 % | HEART RATE: 67 BPM | SYSTOLIC BLOOD PRESSURE: 129 MMHG | WEIGHT: 259 LBS | TEMPERATURE: 96.1 F | BODY MASS INDEX: 50.85 KG/M2 | HEIGHT: 60 IN | DIASTOLIC BLOOD PRESSURE: 75 MMHG

## 2021-06-17 DIAGNOSIS — I27.20 PULMONARY HYPERTENSION (HCC): ICD-10-CM

## 2021-06-17 DIAGNOSIS — R06.02 SHORTNESS OF BREATH: ICD-10-CM

## 2021-06-17 DIAGNOSIS — R00.2 PALPITATIONS: ICD-10-CM

## 2021-06-17 DIAGNOSIS — R07.2 PRECORDIAL PAIN: ICD-10-CM

## 2021-06-17 DIAGNOSIS — R94.39 ABNORMAL STRESS TEST: ICD-10-CM

## 2021-06-17 DIAGNOSIS — I10 ESSENTIAL HYPERTENSION: Primary | ICD-10-CM

## 2021-06-17 DIAGNOSIS — I51.89 GRADE II DIASTOLIC DYSFUNCTION: ICD-10-CM

## 2021-06-17 DIAGNOSIS — R60.9 PERIPHERAL EDEMA: ICD-10-CM

## 2021-06-17 DIAGNOSIS — E78.2 MIXED HYPERLIPIDEMIA: ICD-10-CM

## 2021-06-17 DIAGNOSIS — R00.0 TACHYCARDIA: ICD-10-CM

## 2021-06-17 PROCEDURE — 99214 OFFICE O/P EST MOD 30 MIN: CPT | Performed by: NURSE PRACTITIONER

## 2021-06-17 RX ORDER — LOSARTAN POTASSIUM 50 MG/1
50 TABLET ORAL DAILY
Qty: 90 TABLET | Refills: 3 | Status: SHIPPED | OUTPATIENT
Start: 2021-06-17 | End: 2021-11-15

## 2021-06-17 RX ORDER — ATENOLOL 25 MG/1
25 TABLET ORAL DAILY
Qty: 90 TABLET | Refills: 3 | Status: SHIPPED | OUTPATIENT
Start: 2021-06-17 | End: 2021-12-22 | Stop reason: SDUPTHER

## 2021-06-17 RX ORDER — ISOSORBIDE MONONITRATE 30 MG/1
15 TABLET, EXTENDED RELEASE ORAL DAILY
Qty: 45 TABLET | Refills: 3 | Status: SHIPPED | OUTPATIENT
Start: 2021-06-17 | End: 2021-12-22 | Stop reason: SDUPTHER

## 2021-06-17 NOTE — PROGRESS NOTES
Subjective   Karyna Stewart is a 65 y.o. female     Chief Complaint   Patient presents with   • Follow-up   • Hypertension       HPI    PROBLEM LIST:     1. Chest pain   1.1 Stress Test 2/22/17 - mild lateral wall ischemia; preserved LVEF; positive study without high risk markers   1.2 LHC 3/26/18 - normal coronary arteries; normal right heart pressures, no pulmonary HTN  2. Shortness of breath   2.1 Echo 2/22/17 - mild LVH; EF 60-65%; DD II; mild MR, TR and OR; PA 20-25  2.2 echo 11/2/2020-EF 60 to 65%, mild to moderate LVH  3. Edema  4. Chronic Cough   5. Hypertension   6. Hyperlipidemia   7. Dizziness/ lightheadedness   8. Rheumatoid arthritis, lupus, scleroderma  9. Diabetes; diet controlled   10. IBS   11. GERD  12.  Palpitations  12.1 event monitor 10/1-10/14/2020-normal sinus rhythm, sinus arrhythmia    Patient is a 65-year-old female who presents today for follow-up.  She says she does have some tightness/squeezing in the center of her chest but she really feels like it is related to acid.  She says she has significant heartburn still and even with fruit she has been having terrible acid.  She says that she does not feel like any additional cardiac work-up is warranted because of this.  She says her heart does race at times when she has heavy breathing.  She says she has dizziness at times as well.  She denies any presyncope, syncope, orthopnea or PND.  She has swelling and she takes her water pill daily.  She says she does have shortness of breath if she walks long distance.  She is fatigued a lot.  Patient says she has had a lot more drainage right now her seasonal allergies and she has been coughing again because of it.    Current Outpatient Medications on File Prior to Visit   Medication Sig Dispense Refill   • albuterol (PROAIR RESPICLICK) 108 (90 Base) MCG/ACT inhaler Inhale 2 puffs Every 4 (Four) Hours As Needed for Wheezing.     • amLODIPine (NORVASC) 10 MG tablet Take 1 tablet by mouth Daily.  30 tablet 11   • aspirin 81 MG chewable tablet Chew 1 tablet Daily. 30 tablet 11   • dexlansoprazole (Dexilant) 30 MG capsule Take 30 mg by mouth Daily.     • DULoxetine (CYMBALTA) 30 MG capsule Take 30 mg by mouth Daily.     • leflunomide (ARAVA) 10 MG tablet Take 10 mg by mouth Daily.     • levocetirizine (XYZAL) 5 MG tablet Take 5 mg by mouth Every Evening.     • meloxicam (MOBIC) 15 MG tablet Take 15 mg by mouth Daily.     • montelukast (SINGULAIR) 10 MG tablet Take 10 mg by mouth Every Night.     • Naproxen Sodium (ALEVE PO) Take  by mouth. PRN     • nitroglycerin (NITROSTAT) 0.4 MG SL tablet PLACE ONE TABLET UNDER THE TONGUE AS NEEDED FOR ANGINA MAY REPEAT EVERY 5 MINUTES FOR UP TO THREE DOSES 25 tablet 0   • potassium chloride (K-DUR,KLOR-CON) 20 MEQ CR tablet Take 2 tablets by mouth Daily. 180 tablet 3   • predniSONE (DELTASONE) 5 MG tablet Take 5 mg by mouth Daily.     • sucralfate (CARAFATE) 1 g tablet 2 (Two) Times a Day.     • torsemide (DEMADEX) 20 MG tablet Take 1 tablet by mouth Daily As Needed (edema). 90 tablet 3   • triamterene-hydrochlorothiazide (DYAZIDE) 37.5-25 MG per capsule Take 1 capsule by mouth Every Morning.     • [DISCONTINUED] atenolol (TENORMIN) 25 MG tablet Take 1 tablet by mouth Daily. 90 tablet 3   • [DISCONTINUED] isosorbide mononitrate (IMDUR) 30 MG 24 hr tablet Take 1/2 (one-half) tablet by mouth once daily 45 tablet 3   • [DISCONTINUED] losartan (Cozaar) 50 MG tablet Take 1 tablet by mouth Daily. 90 tablet 3   • [DISCONTINUED] RABEprazole (ACIPHEX) 20 MG EC tablet Take 1 tablet by mouth Daily. 90 tablet 3   • [DISCONTINUED] Tiotropium Bromide Monohydrate (SPIRIVA RESPIMAT IN) Inhale.       No current facility-administered medications on file prior to visit.       ALLERGIES    Diovan [valsartan] and Iodine    Past Medical History:   Diagnosis Date   • Anxiety and depression    • Arthritis    • Asthma    • Chest pain    • Chronic cough    • COVID-19 vaccine administered 1st -  2021 - 04/15/2021 - Moderna   • Diabetes (CMS/HCC)    • Fibromyalgia    • GERD (gastroesophageal reflux disease)    • High triglycerides    • Hypertension    • Irritable bowel syndrome    • Lupus (systemic lupus erythematosus) (CMS/HCC)    • Migraines    • Scleroderma (CMS/HCC)    • Seasonal allergies    • Ulcerative colitis (CMS/HCC)        Social History     Socioeconomic History   • Marital status:      Spouse name: Not on file   • Number of children: 0   • Years of education: Not on file   • Highest education level: Not on file   Tobacco Use   • Smoking status: Former Smoker     Types: Cigarettes     Quit date:      Years since quittin.4   • Smokeless tobacco: Never Used   • Tobacco comment: quit 15 yrs ago    Substance and Sexual Activity   • Alcohol use: No   • Drug use: No   • Sexual activity: Defer       Family History   Problem Relation Age of Onset   • COPD Mother    • Hypertension Mother    • Diverticulitis Father 45   • No Known Problems Son    • No Known Problems Daughter    • Stomach cancer Paternal Grandmother        Review of Systems   Constitutional: Positive for fatigue (tired all the time ). Negative for appetite change, chills and fever.   HENT: Negative for congestion, rhinorrhea and sneezing.    Eyes: Positive for visual disturbance (glasses).   Respiratory: Positive for cough (dry ( allergies ); still better than it was), chest tightness (Center of her chest ( feels like she is being pressed on ) ) and shortness of breath (walking at long distance ). Negative for wheezing.    Cardiovascular: Positive for chest pain (center of chest tightness/squeezing; occurs at anytime; thinks due to acid; even with fruit and acid has been terrible, even though she is eating fruit with no acid ), palpitations (heart races at times while walking and breathing heavy it flutters has well ) and leg swelling (legs, feet and ankles; takes water pill daily ).   Gastrointestinal:  "Positive for constipation (comes and go ), diarrhea (after meals ) and nausea (all the time ). Negative for abdominal pain, blood in stool and vomiting.   Endocrine: Positive for heat intolerance (night sweats , hot flahses ). Negative for cold intolerance.   Genitourinary: Negative for difficulty urinating, dysuria, frequency, hematuria and urgency.        Bladder stimulator, works   Musculoskeletal: Positive for arthralgias (lower back , right hip , knees, ankles , shoulder ,neck arms ), back pain (lower ), joint swelling (wrists , ankles , knees ) and neck stiffness (soreness in her neck ). Negative for neck pain.   Skin: Negative for color change, pallor, rash and wound.   Allergic/Immunologic: Positive for environmental allergies (seasonal ). Negative for food allergies.   Neurological: Positive for dizziness (sometimes she states nothing really triggers it ), weakness (right hip , lower back and both legs ), light-headedness (nothing triggers it she can be sitting walking . lt can happens after she wakes up or after a headache ), numbness (right hip, knee and both hands ) and headaches (H/O of headaches for years ).   Hematological: Does not bruise/bleed easily.   Psychiatric/Behavioral: Positive for sleep disturbance (hard to go and hard to stay asleep she gets 4 hrs on a good night , tosses and turns all night or she sits up until she can fall asleep ).       Objective   /75 (BP Location: Left arm)   Pulse 67   Temp 96.1 °F (35.6 °C)   Ht 152.4 cm (60\")   Wt 117 kg (259 lb)   SpO2 95%   BMI 50.58 kg/m²   Vitals:    06/17/21 0948   BP: 129/75   BP Location: Left arm   Pulse: 67   Temp: 96.1 °F (35.6 °C)   SpO2: 95%   Weight: 117 kg (259 lb)   Height: 152.4 cm (60\")      Lab Results (most recent)     None        Physical Exam  Vitals reviewed.   Constitutional:       General: She is awake.      Appearance: Normal appearance. She is well-developed and well-groomed. She is morbidly obese.   HENT:     "  Head: Normocephalic.   Eyes:      General: Lids are normal.      Comments: Wears glasses    Neck:      Vascular: No carotid bruit, hepatojugular reflux or JVD.   Cardiovascular:      Rate and Rhythm: Normal rate and regular rhythm.      Pulses:           Radial pulses are 2+ on the right side and 2+ on the left side.        Dorsalis pedis pulses are 2+ on the right side and 2+ on the left side.        Posterior tibial pulses are 2+ on the right side and 2+ on the left side.      Heart sounds: Normal heart sounds.   Pulmonary:      Effort: Pulmonary effort is normal.      Breath sounds: Normal breath sounds and air entry.   Abdominal:      General: Bowel sounds are normal.      Palpations: Abdomen is soft.   Musculoskeletal:      Right lower leg: No edema.      Left lower leg: No edema.      Comments: Patient uses a cane   Skin:     General: Skin is warm and dry.   Neurological:      Mental Status: She is alert and oriented to person, place, and time.   Psychiatric:         Attention and Perception: Attention and perception normal.         Mood and Affect: Mood and affect normal.         Speech: Speech normal.         Behavior: Behavior normal. Behavior is cooperative.         Thought Content: Thought content normal.         Cognition and Memory: Cognition and memory normal.         Judgment: Judgment normal.         Procedure   Procedures         Assessment/Plan      Diagnosis Plan   1. Essential hypertension  atenolol (TENORMIN) 25 MG tablet    losartan (Cozaar) 50 MG tablet   2. Grade II diastolic dysfunction     3. Mixed hyperlipidemia     4. Pulmonary hypertension (CMS/HCC)     5. Shortness of breath     6. Peripheral edema     7. Precordial pain     8. Abnormal stress test  isosorbide mononitrate (IMDUR) 30 MG 24 hr tablet   9. Palpitations  atenolol (TENORMIN) 25 MG tablet   10. Tachycardia  atenolol (TENORMIN) 25 MG tablet       Return in about 6 months (around 12/17/2021).    Hypertension-patient is on  amlodipine, atenolol, losartan and Dyazide and doing well.  Diastolic dysfunction/peripheral edema-patient is on torsemide and Dyazide.  Hyperlipidemia-patient is diet controlled.  Pulmonary hypertension/shortness of breath-patient follows pulmonary.  Chest pain-patient feels like it is directly related to her acid reflux and does not feel like she needs another ischemia work-up.  She did have a heart cath in 2018 with normal coronary arteries.  Palpitations/tachycardia-patient is doing well beta-blocker.  She will continue her medication regimen.  She will follow-up in 6 months or sooner if any changes.  Karyna AUSTIN Stewart  reports that she quit smoking about 19 years ago. Her smoking use included cigarettes. She has never used smokeless tobacco..Advance Care Planning   ACP discussion was declined by the patient. Patient does not have an advance directive, declines further assistance.    Electronically signed by:

## 2021-06-17 NOTE — PATIENT INSTRUCTIONS
Advance Care Planning and Advance Directives     You make decisions on a daily basis - decisions about where you want to live, your career, your home, your life. Perhaps one of the most important decisions you face is your choice for future medical care. Take time to talk with your family and your healthcare team and start planning today.  Advance Care Planning is a process that can help you:  · Understand possible future healthcare decisions in light of your own experiences  · Reflect on those decision in light of your goals and values  · Discuss your decisions with those closest to you and the healthcare professionals that care for you  · Make a plan by creating a document that reflects your wishes    Surrogate Decision Maker  In the event of a medical emergency, which has left you unable to communicate or to make your own decisions, you would need someone to make decisions for you.  It is important to discuss your preferences for medical treatment with this person while you are in good health.     Qualities of a surrogate decision maker:  • Willing to take on this role and responsibility  • Knows what you want for future medical care  • Willing to follow your wishes even if they don't agree with them  • Able to make difficult medical decisions under stressful circumstances    Advance Directives  These are legal documents you can create that will guide your healthcare team and decision maker(s) when needed. These documents can be stored in the electronic medical record.    · Living Will - a legal document to guide your care if you have a terminal condition or a serious illness and are unable to communicate. States vary by statute in document names/types, but most forms may include one or more of the following:        -  Directions regarding life-prolonging treatments        -  Directions regarding artificially provided nutrition/hydration        -  Choosing a healthcare decision maker        -  Direction  regarding organ/tissue donation    · Durable Power of  for Healthcare - this document names an -in-fact to make medical decisions for you, but it may also allow this person to make personal and financial decisions for you. Please seek the advice of an  if you need this type of document.    **Advance Directives are not required and no one may discriminate against you if you do not sign one.    Medical Orders  Many states allow specific forms/orders signed by your physician to record your wishes for medical treatment in your current state of health. This form, signed in personal communication with your physician, addresses resuscitation and other medical interventions that you may or may not want.      For more information or to schedule a time with a [facility] Advance Care Planning Facilitator contact: [email or web address] or call [phone] and someone will contact you directly.

## 2021-11-12 DIAGNOSIS — I10 ESSENTIAL HYPERTENSION: ICD-10-CM

## 2021-11-15 RX ORDER — LOSARTAN POTASSIUM 50 MG/1
TABLET ORAL
Qty: 90 TABLET | Refills: 0 | Status: SHIPPED | OUTPATIENT
Start: 2021-11-15 | End: 2022-10-18 | Stop reason: SDUPTHER

## 2021-12-22 ENCOUNTER — LAB (OUTPATIENT)
Dept: CARDIOLOGY | Facility: CLINIC | Age: 66
End: 2021-12-22

## 2021-12-22 ENCOUNTER — OFFICE VISIT (OUTPATIENT)
Dept: CARDIOLOGY | Facility: CLINIC | Age: 66
End: 2021-12-22

## 2021-12-22 ENCOUNTER — TELEPHONE (OUTPATIENT)
Dept: CARDIOLOGY | Facility: CLINIC | Age: 66
End: 2021-12-22

## 2021-12-22 VITALS
BODY MASS INDEX: 48.88 KG/M2 | SYSTOLIC BLOOD PRESSURE: 135 MMHG | WEIGHT: 249 LBS | HEART RATE: 101 BPM | DIASTOLIC BLOOD PRESSURE: 91 MMHG | TEMPERATURE: 97 F | HEIGHT: 60 IN | OXYGEN SATURATION: 96 %

## 2021-12-22 DIAGNOSIS — I10 ESSENTIAL HYPERTENSION: ICD-10-CM

## 2021-12-22 DIAGNOSIS — R60.9 PERIPHERAL EDEMA: ICD-10-CM

## 2021-12-22 DIAGNOSIS — E87.6 HYPOKALEMIA: ICD-10-CM

## 2021-12-22 DIAGNOSIS — I27.20 PULMONARY HYPERTENSION (HCC): ICD-10-CM

## 2021-12-22 DIAGNOSIS — R00.2 PALPITATIONS: ICD-10-CM

## 2021-12-22 DIAGNOSIS — R06.02 SHORTNESS OF BREATH: ICD-10-CM

## 2021-12-22 DIAGNOSIS — E78.2 MIXED HYPERLIPIDEMIA: ICD-10-CM

## 2021-12-22 DIAGNOSIS — Z86.39 HISTORY OF LOW POTASSIUM: ICD-10-CM

## 2021-12-22 DIAGNOSIS — R42 DIZZINESS: ICD-10-CM

## 2021-12-22 DIAGNOSIS — R07.2 PRECORDIAL PAIN: Primary | ICD-10-CM

## 2021-12-22 DIAGNOSIS — I51.89 GRADE II DIASTOLIC DYSFUNCTION: ICD-10-CM

## 2021-12-22 DIAGNOSIS — I10 ESSENTIAL HYPERTENSION: Primary | ICD-10-CM

## 2021-12-22 DIAGNOSIS — R00.0 TACHYCARDIA: ICD-10-CM

## 2021-12-22 LAB
ANION GAP SERPL CALCULATED.3IONS-SCNC: 14 MMOL/L (ref 5–15)
BUN SERPL-MCNC: 15 MG/DL (ref 8–23)
BUN/CREAT SERPL: 23.1 (ref 7–25)
CALCIUM SPEC-SCNC: 9.3 MG/DL (ref 8.6–10.5)
CHLORIDE SERPL-SCNC: 103 MMOL/L (ref 98–107)
CO2 SERPL-SCNC: 26 MMOL/L (ref 22–29)
CREAT SERPL-MCNC: 0.65 MG/DL (ref 0.57–1)
GFR SERPL CREATININE-BSD FRML MDRD: 111 ML/MIN/1.73
GLUCOSE SERPL-MCNC: 104 MG/DL (ref 65–99)
POTASSIUM SERPL-SCNC: 3.2 MMOL/L (ref 3.5–5.2)
SODIUM SERPL-SCNC: 143 MMOL/L (ref 136–145)

## 2021-12-22 PROCEDURE — 80048 BASIC METABOLIC PNL TOTAL CA: CPT | Performed by: NURSE PRACTITIONER

## 2021-12-22 PROCEDURE — 99214 OFFICE O/P EST MOD 30 MIN: CPT | Performed by: NURSE PRACTITIONER

## 2021-12-22 PROCEDURE — 36415 COLL VENOUS BLD VENIPUNCTURE: CPT

## 2021-12-22 RX ORDER — TORSEMIDE 20 MG/1
20 TABLET ORAL DAILY PRN
Qty: 90 TABLET | Refills: 3 | Status: SHIPPED | OUTPATIENT
Start: 2021-12-22 | End: 2023-02-23 | Stop reason: SDUPTHER

## 2021-12-22 RX ORDER — POTASSIUM CHLORIDE 1500 MG/1
20 TABLET, FILM COATED, EXTENDED RELEASE ORAL DAILY
Qty: 90 TABLET | Refills: 3 | Status: SHIPPED | OUTPATIENT
Start: 2021-12-22 | End: 2023-01-23

## 2021-12-22 RX ORDER — AMLODIPINE BESYLATE 10 MG/1
10 TABLET ORAL DAILY
Qty: 90 TABLET | Refills: 3 | Status: SHIPPED | OUTPATIENT
Start: 2021-12-22 | End: 2023-01-23

## 2021-12-22 RX ORDER — ISOSORBIDE MONONITRATE 30 MG/1
15 TABLET, EXTENDED RELEASE ORAL DAILY
Qty: 45 TABLET | Refills: 3 | Status: SHIPPED | OUTPATIENT
Start: 2021-12-22 | End: 2022-09-06

## 2021-12-22 RX ORDER — ATENOLOL 25 MG/1
25 TABLET ORAL DAILY
Qty: 90 TABLET | Refills: 3 | Status: SHIPPED | OUTPATIENT
Start: 2021-12-22 | End: 2022-06-22 | Stop reason: SDUPTHER

## 2021-12-22 NOTE — TELEPHONE ENCOUNTER
----- Message from KALA August sent at 12/22/2021  2:56 PM EST -----  Please advise patient. Needs to be on KCL 20 meq PO daily.  Repeat BMP in 1 week.          Called and advised pt of the above mess with her lab results , pt will repeat labs here next Wed and she was made aware that we would call in her potassium 20 meq labs ordered and medication updated     Creatinine   0.57 - 1.00 mg/dL 0.65      Sodium   136 - 145 mmol/L 143      Potassium   3.5 - 5.2 mmol/L 3.2 BERTHA Hairston

## 2021-12-22 NOTE — PROGRESS NOTES
Subjective   Karyna Stewart is a 66 y.o. female     Chief Complaint   Patient presents with   • Follow-up   • Hypertension       HPI    PROBLEM LIST:     1. Chest pain   1.1 Stress Test 2/22/17 - mild lateral wall ischemia; preserved LVEF; positive study without high risk markers   1.2 LHC 3/26/18 - normal coronary arteries; normal right heart pressures, no pulmonary HTN  2. Shortness of breath   2.1 Echo 2/22/17 - mild LVH; EF 60-65%; DD II; mild MR, TR and NM; PA 20-25  2.2 echo 11/2/2020-EF 60 to 65%, mild to moderate LVH  3. Edema  4. Chronic Cough   5. Hypertension   6. Hyperlipidemia   7. Dizziness/ lightheadedness   8. Rheumatoid arthritis, lupus, scleroderma  9. Diabetes; diet controlled   10. IBS   11. GERD  12.  Palpitations  12.1 event monitor 10/1-10/14/2020-normal sinus rhythm, sinus arrhythmia    Patient is a 66-year-old female who presents today for follow-up.  Patient denies any chest pain however she will have chest pressure randomly.  She says it occurs when she gets short of breath.  She is not very active.  She says that the only time it happens and she does get short of breath first.  Patient says she does have palpitations and she noticed about the last few months she has had them more however she is on steroids and she been very anxious because she had been sick.  She denies any dizziness, presyncope, syncope, orthopnea or PND.  She does have some lightheadedness with her headaches.  Patient says she does get swelling in her legs and it usually goes down overnight.  She takes her water pill every day.  She does have shortness of breath walking any distance and this has not changed.  Patient had a tooth infection on the left side of her face and went to her doctor who gave her antibiotics.  It did get better but prior to that she was using Orajel and actually scalded and burned her gums.  She went to urgent care and was actually sent to the ER and given IV fluids because of dehydration.   She says every since being sick she has had some numbness in the lips on the left side as well as the face.  She says the face still stays swollen.  She has since had the tooth removed.  Patient has not taken any medications yet today.  Patient had some blood work in the hospital and her K was low so we will recheck that.  She had not been taking potassium with her torsemide.    Current Outpatient Medications on File Prior to Visit   Medication Sig Dispense Refill   • albuterol (PROAIR RESPICLICK) 108 (90 Base) MCG/ACT inhaler Inhale 2 puffs Every 4 (Four) Hours As Needed for Wheezing.     • aspirin 81 MG chewable tablet Chew 1 tablet Daily. 30 tablet 11   • dexlansoprazole (Dexilant) 30 MG capsule Take 30 mg by mouth Daily.     • DULoxetine (CYMBALTA) 30 MG capsule Take 30 mg by mouth Daily.     • leflunomide (ARAVA) 10 MG tablet Take 10 mg by mouth Daily.     • levocetirizine (XYZAL) 5 MG tablet Take 5 mg by mouth Every Evening.     • losartan (COZAAR) 50 MG tablet Take 1 tablet by mouth once daily 90 tablet 0   • meloxicam (MOBIC) 15 MG tablet Take 15 mg by mouth Daily.     • montelukast (SINGULAIR) 10 MG tablet Take 10 mg by mouth Every Night.     • Naproxen Sodium (ALEVE PO) Take  by mouth. PRN     • nitroglycerin (NITROSTAT) 0.4 MG SL tablet PLACE ONE TABLET UNDER THE TONGUE AS NEEDED FOR ANGINA MAY REPEAT EVERY 5 MINUTES FOR UP TO THREE DOSES 25 tablet 0   • predniSONE (DELTASONE) 5 MG tablet Take 5 mg by mouth Daily.     • sucralfate (CARAFATE) 1 g tablet 2 (Two) Times a Day.     • triamterene-hydrochlorothiazide (DYAZIDE) 37.5-25 MG per capsule Take 1 capsule by mouth Every Morning.     • [DISCONTINUED] amLODIPine (NORVASC) 10 MG tablet Take 1 tablet by mouth Daily. 30 tablet 11   • [DISCONTINUED] atenolol (TENORMIN) 25 MG tablet Take 1 tablet by mouth Daily. 90 tablet 3   • [DISCONTINUED] isosorbide mononitrate (IMDUR) 30 MG 24 hr tablet Take 0.5 tablets by mouth Daily. 45 tablet 3   • [DISCONTINUED]  potassium chloride (K-DUR,KLOR-CON) 20 MEQ CR tablet Take 2 tablets by mouth Daily. 180 tablet 3   • [DISCONTINUED] torsemide (DEMADEX) 20 MG tablet Take 1 tablet by mouth Daily As Needed (edema). 90 tablet 3     No current facility-administered medications on file prior to visit.       ALLERGIES    Diovan [valsartan] and Iodine    Past Medical History:   Diagnosis Date   • Anxiety and depression    • Arthritis    • Asthma    • Chest pain    • Chronic cough    • COVID-19 vaccine administered  - 2021    2nd - 04/15/2021 - Moderna ( Booster scheduled for 2021 Moderna )    • Diabetes (HCC)    • Fibromyalgia    • GERD (gastroesophageal reflux disease)    • High triglycerides    • Hypertension    • Irritable bowel syndrome    • Lupus (systemic lupus erythematosus) ([facility])    • Migraines    • Scleroderma ([facility])    • Seasonal allergies    • Ulcerative colitis ([facility])        Social History     Socioeconomic History   • Marital status:    • Number of children: 0   Tobacco Use   • Smoking status: Former Smoker     Types: Cigarettes     Quit date:      Years since quittin.9   • Smokeless tobacco: Never Used   • Tobacco comment: quit 15 yrs ago    Substance and Sexual Activity   • Alcohol use: No   • Drug use: No   • Sexual activity: Defer       Family History   Problem Relation Age of Onset   • COPD Mother    • Hypertension Mother    • Diverticulitis Father 45   • No Known Problems Son    • No Known Problems Daughter    • Stomach cancer Paternal Grandmother        Review of Systems   Constitutional: Positive for appetite change (decreased appetite due to left side of her face numbness and her left side of her lip ). Negative for chills, fatigue and fever.   HENT: Negative for congestion, rhinorrhea and sore throat.    Eyes: Positive for visual disturbance (glasses).   Respiratory: Positive for cough (dry cough ), chest tightness (center of the chest ( more of a pressure ) happens at random times; no  change, usually occurs when she gets short of breath, not very active ), shortness of breath (walking at any distance; no change ) and wheezing (she notices it off and on sometimes in the ealry am and other times in the late pm ).    Cardiovascular: Positive for palpitations (fluttering ,heart races and pounds; she had them more when she was sick, not sure if it was her anxiety; on steroids ) and leg swelling (legs, feet and ankles swelling does not go down at night; uses it daily ). Negative for chest pain.   Gastrointestinal: Positive for abdominal pain (pt states that she has Gastro problmes she see's Dr Mccoy at  ), constipation (at times ) and diarrhea (at times ). Negative for blood in stool, nausea and vomiting.   Endocrine: Negative for cold intolerance and heat intolerance.   Genitourinary: Positive for difficulty urinating (hard to stop ), frequency (several times a day she will wear pads to help ) and urgency (hard to hold and control ). Negative for dysuria and hematuria.   Musculoskeletal: Positive for arthralgias (through out the body ), back pain (lower ), gait problem (uses a cane) and joint swelling (wrists, shoulders, knees and ankles ). Negative for neck pain and neck stiffness.   Skin: Positive for wound (right wrist ). Negative for color change, pallor and rash.   Allergic/Immunologic: Positive for environmental allergies (seasonal , grass and aminals ). Negative for food allergies.   Neurological: Positive for weakness (left side of her face , lower back , legs ( right leg is the worse ) ), light-headedness (at times ( when she has a H/A ) ), numbness (right leg , left side of her face and lip; due to inj from tooth infection ) and headaches (H/O of Migrains ). Negative for dizziness.   Hematological: Does not bruise/bleed easily.   Psychiatric/Behavioral: Positive for sleep disturbance (hard to go and hard to stay asleep ).       Objective   /91 (BP Location: Left arm)   Pulse 101    "Temp 97 °F (36.1 °C)   Ht 152.4 cm (60\")   Wt 113 kg (249 lb)   SpO2 96%   BMI 48.63 kg/m²   Vitals:    12/22/21 0922   BP: 135/91   BP Location: Left arm   Pulse: 101   Temp: 97 °F (36.1 °C)   SpO2: 96%   Weight: 113 kg (249 lb)   Height: 152.4 cm (60\")      Lab Results (most recent)     None        Physical Exam  Vitals reviewed.   Constitutional:       General: She is awake.      Appearance: Normal appearance. She is well-developed and well-groomed. She is morbidly obese.   HENT:      Head: Normocephalic.   Eyes:      General: Lids are normal.      Comments: Wears glasses    Neck:      Vascular: No carotid bruit, hepatojugular reflux or JVD.   Cardiovascular:      Rate and Rhythm: Regular rhythm. Tachycardia present.      Pulses:           Radial pulses are 2+ on the right side and 2+ on the left side.        Dorsalis pedis pulses are 2+ on the right side and 2+ on the left side.        Posterior tibial pulses are 2+ on the right side and 2+ on the left side.      Heart sounds: Normal heart sounds.   Pulmonary:      Effort: Pulmonary effort is normal.      Breath sounds: Normal breath sounds and air entry.   Abdominal:      General: Bowel sounds are normal.      Palpations: Abdomen is soft.   Musculoskeletal:      Right lower leg: No edema.      Left lower leg: No edema.      Comments: Uses a cane   Skin:     General: Skin is warm and dry.   Neurological:      Mental Status: She is alert and oriented to person, place, and time.   Psychiatric:         Attention and Perception: Attention and perception normal.         Mood and Affect: Mood and affect normal.         Speech: Speech normal.         Behavior: Behavior normal. Behavior is cooperative.         Thought Content: Thought content normal.         Cognition and Memory: Cognition and memory normal.         Judgment: Judgment normal.         Procedure   Procedures         Assessment/Plan      Diagnosis Plan   1. Precordial pain  isosorbide mononitrate " (IMDUR) 30 MG 24 hr tablet   2. Essential hypertension  amLODIPine (NORVASC) 10 MG tablet    atenolol (TENORMIN) 25 MG tablet   3. Grade II diastolic dysfunction  torsemide (DEMADEX) 20 MG tablet   4. Mixed hyperlipidemia     5. Shortness of breath     6. Pulmonary hypertension (HCC)     7. Peripheral edema  torsemide (DEMADEX) 20 MG tablet   8. Palpitations  atenolol (TENORMIN) 25 MG tablet   9. Tachycardia  atenolol (TENORMIN) 25 MG tablet   10. Dizziness     11. Hypokalemia  Basic Metabolic Panel       Return in about 6 months (around 6/22/2022).    Chest pain-directly related to her shortness of breath and only when she has shortness of breath.  She does not feel it is cardiac in nature.  Patient is on isosorbide and can use nitro as needed for chest pain no resolution to go to the ER.  Patient did have normal coronaries in 2018.  Hypertension-patient's on amlodipine, atenolol and losartan.  She has not taken her medication yet today.  Diastolic dysfunction/peripheral edema-patient's on torsemide and doing well.  Hyperlipidemia-patient is diet controlled.  Shortness of breath-stable.  Pulmonary hypertension-stable as well.  Tachycardia/palpitations-stable with beta-blocker.  Dizziness-related to patient's headaches.  Hypokalemia-patient will get a BMP today.  She will continue her medication regimen.  She will follow-up in 6 months or sooner if any changes.       Karyna A Pat  reports that she quit smoking about 19 years ago. Her smoking use included cigarettes. She has never used smokeless tobacco..Advance Care Planning   ACP discussion was declined by the patient. Patient does not have an advance directive, declines further assistance. Patient brought in medicine list to appointment, it's been reviewed with patient and med list was updated in the chart.     Electronically signed by:

## 2021-12-22 NOTE — PATIENT INSTRUCTIONS
Advance Care Planning and Advance Directives     You make decisions on a daily basis - decisions about where you want to live, your career, your home, your life. Perhaps one of the most important decisions you face is your choice for future medical care. Take time to talk with your family and your healthcare team and start planning today.  Advance Care Planning is a process that can help you:  · Understand possible future healthcare decisions in light of your own experiences  · Reflect on those decision in light of your goals and values  · Discuss your decisions with those closest to you and the healthcare professionals that care for you  · Make a plan by creating a document that reflects your wishes    Surrogate Decision Maker  In the event of a medical emergency, which has left you unable to communicate or to make your own decisions, you would need someone to make decisions for you.  It is important to discuss your preferences for medical treatment with this person while you are in good health.     Qualities of a surrogate decision maker:  • Willing to take on this role and responsibility  • Knows what you want for future medical care  • Willing to follow your wishes even if they don't agree with them  • Able to make difficult medical decisions under stressful circumstances    Advance Directives  These are legal documents you can create that will guide your healthcare team and decision maker(s) when needed. These documents can be stored in the electronic medical record.    · Living Will - a legal document to guide your care if you have a terminal condition or a serious illness and are unable to communicate. States vary by statute in document names/types, but most forms may include one or more of the following:        -  Directions regarding life-prolonging treatments        -  Directions regarding artificially provided nutrition/hydration        -  Choosing a healthcare decision maker        -  Direction  regarding organ/tissue donation    · Durable Power of  for Healthcare - this document names an -in-fact to make medical decisions for you, but it may also allow this person to make personal and financial decisions for you. Please seek the advice of an  if you need this type of document.    **Advance Directives are not required and no one may discriminate against you if you do not sign one.    Medical Orders  Many states allow specific forms/orders signed by your physician to record your wishes for medical treatment in your current state of health. This form, signed in personal communication with your physician, addresses resuscitation and other medical interventions that you may or may not want.        Edema    Edema is an abnormal buildup of fluids in the body tissues and under the skin. Swelling of the legs, feet, and ankles is a common symptom that becomes more likely as you get older. Swelling is also common in looser tissues, like around the eyes. When the affected area is squeezed, the fluid may move out of that spot and leave a dent for a few moments. This dent is called pitting edema.  There are many possible causes of edema. Eating too much salt (sodium) and being on your feet or sitting for a long time can cause edema in your legs, feet, and ankles. Hot weather may make edema worse. Common causes of edema include:  · Heart failure.  · Liver or kidney disease.  · Weak leg blood vessels.  · Cancer.  · An injury.  · Pregnancy.  · Medicines.  · Being obese.  · Low protein levels in the blood.  Edema is usually painless. Your skin may look swollen or shiny.  Follow these instructions at home:  · Keep the affected body part raised (elevated) above the level of your heart when you are sitting or lying down.  · Do not sit still or stand for long periods of time.  · Do not wear tight clothing. Do not wear garters on your upper legs.  · Exercise your legs to get your circulation going. This  helps to move the fluid back into your blood vessels, and it may help the swelling go down.  · Wear elastic bandages or support stockings to reduce swelling as told by your health care provider.  · Eat a low-salt (low-sodium) diet to reduce fluid as told by your health care provider.  · Depending on the cause of your swelling, you may need to limit how much fluid you drink (fluid restriction).  · Take over-the-counter and prescription medicines only as told by your health care provider.  Contact a health care provider if:  · Your edema does not get better with treatment.  · You have heart, liver, or kidney disease and have symptoms of edema.  · You have sudden and unexplained weight gain.  Get help right away if:  · You develop shortness of breath or chest pain.  · You cannot breathe when you lie down.  · You develop pain, redness, or warmth in the swollen areas.  · You have heart, liver, or kidney disease and suddenly get edema.  · You have a fever and your symptoms suddenly get worse.  Summary  · Edema is an abnormal buildup of fluids in the body tissues and under the skin.  · Eating too much salt (sodium) and being on your feet or sitting for a long time can cause edema in your legs, feet, and ankles.  · Keep the affected body part raised (elevated) above the level of your heart when you are sitting or lying down.  This information is not intended to replace advice given to you by your health care provider. Make sure you discuss any questions you have with your health care provider.  Document Revised: 05/06/2020 Document Reviewed: 01/20/2018  CS[employer] Patient Education © 2021 [employer]  For more information or to schedule a time with a [facility] Advance Care Planning Facilitator contact: [email or web address] or call [phone] and someone will contact you directly.

## 2021-12-29 ENCOUNTER — LAB (OUTPATIENT)
Dept: LAB | Facility: HOSPITAL | Age: 66
End: 2021-12-29

## 2021-12-29 DIAGNOSIS — I10 ESSENTIAL HYPERTENSION: ICD-10-CM

## 2021-12-29 DIAGNOSIS — R00.2 PALPITATIONS: ICD-10-CM

## 2021-12-29 DIAGNOSIS — R42 DIZZINESS: ICD-10-CM

## 2021-12-29 DIAGNOSIS — R06.02 SHORTNESS OF BREATH: ICD-10-CM

## 2021-12-29 LAB
ANION GAP SERPL CALCULATED.3IONS-SCNC: 15.5 MMOL/L (ref 5–15)
BUN SERPL-MCNC: 10 MG/DL (ref 8–23)
BUN/CREAT SERPL: 16.1 (ref 7–25)
CALCIUM SPEC-SCNC: 8.9 MG/DL (ref 8.6–10.5)
CHLORIDE SERPL-SCNC: 105 MMOL/L (ref 98–107)
CO2 SERPL-SCNC: 23.5 MMOL/L (ref 22–29)
CREAT SERPL-MCNC: 0.62 MG/DL (ref 0.57–1)
GFR SERPL CREATININE-BSD FRML MDRD: 117 ML/MIN/1.73
GLUCOSE SERPL-MCNC: 101 MG/DL (ref 65–99)
POTASSIUM SERPL-SCNC: 3.7 MMOL/L (ref 3.5–5.2)
SODIUM SERPL-SCNC: 144 MMOL/L (ref 136–145)

## 2021-12-29 PROCEDURE — 80048 BASIC METABOLIC PNL TOTAL CA: CPT

## 2021-12-29 PROCEDURE — 36415 COLL VENOUS BLD VENIPUNCTURE: CPT

## 2021-12-30 ENCOUNTER — TELEPHONE (OUTPATIENT)
Dept: CARDIOLOGY | Facility: CLINIC | Age: 66
End: 2021-12-30

## 2021-12-30 DIAGNOSIS — R00.2 PALPITATIONS: ICD-10-CM

## 2021-12-30 DIAGNOSIS — R00.0 TACHYCARDIA: ICD-10-CM

## 2021-12-30 DIAGNOSIS — I10 ESSENTIAL HYPERTENSION: Primary | ICD-10-CM

## 2021-12-30 DIAGNOSIS — I51.89 GRADE II DIASTOLIC DYSFUNCTION: ICD-10-CM

## 2021-12-30 NOTE — TELEPHONE ENCOUNTER
----- Message from KALA August sent at 12/29/2021  7:22 PM EST -----  Regarding: FW:  Please advise patient  ----- Message -----  From: Lab, Background User  Sent: 12/29/2021   2:43 PM EST  To: KALA August

## 2021-12-30 NOTE — TELEPHONE ENCOUNTER
Pt was advised of the mess below from KALA Perez labs ordered    Her k is back to normal range so just hold on to potassium for now.  Repeat a bmp in 4 weeks to make sure holding

## 2021-12-30 NOTE — TELEPHONE ENCOUNTER
Pt was advised of lab results :    Creatinine   0.57 - 1.00 mg/dL 0.62      Sodium   136 - 145 mmol/L 144      Potassium   3.5 - 5.2 mmol/L 3.7        Pt states that she did not  the Potassium I advised her that we did send in Rx om 12/22/2021 , she states that she is at [facility] now and she will  the meds and she will wait until she hears from us to what she needs to do with the potassium , alothough her Potassium was 3.2 8 days ago it has went up to 3.7 and she denies that she has been taking the potassium . I advised her that I would speak with KALA Perez and get back with her     Vivi Hernández, SOPHIAA

## 2022-03-07 ENCOUNTER — TELEPHONE (OUTPATIENT)
Dept: CARDIOLOGY | Facility: CLINIC | Age: 67
End: 2022-03-07

## 2022-03-07 NOTE — TELEPHONE ENCOUNTER
Received cardiac clearance request from  stating pt has deep bone biopsy of mandible scheduled for 04/09/2022 and is requiring a cardiac clearance. Placed cardiac clearance request in Dinora's inbox to review and address with provider.

## 2022-04-21 ENCOUNTER — TELEPHONE (OUTPATIENT)
Dept: CARDIOLOGY | Facility: CLINIC | Age: 67
End: 2022-04-21

## 2022-04-21 NOTE — TELEPHONE ENCOUNTER
Pt returned call, and Hui didn't see the Losartan 50 1 tab daily when she was discharged from being in  hospital 04/05-04/12    Osteomyelitis from having teeth removed.      I advised pt it was on medication list.    BP running 130/64 , denies any cardiac sx just wanted details and to make sure.  Pt verbally understands

## 2022-04-21 NOTE — TELEPHONE ENCOUNTER
Got a message from triage line stating, she was needing clarification a medication from pts list.    Returned number given [phone] and no answer, left message if any information is needed that would need to send release form with what they needed.     JOAQUIM ESPINO MA

## 2022-06-22 ENCOUNTER — OFFICE VISIT (OUTPATIENT)
Dept: CARDIOLOGY | Facility: CLINIC | Age: 67
End: 2022-06-22

## 2022-06-22 VITALS
HEIGHT: 60 IN | SYSTOLIC BLOOD PRESSURE: 129 MMHG | BODY MASS INDEX: 46.33 KG/M2 | DIASTOLIC BLOOD PRESSURE: 73 MMHG | WEIGHT: 236 LBS | HEART RATE: 80 BPM | OXYGEN SATURATION: 96 %

## 2022-06-22 DIAGNOSIS — I51.89 GRADE II DIASTOLIC DYSFUNCTION: ICD-10-CM

## 2022-06-22 DIAGNOSIS — R00.2 PALPITATIONS: ICD-10-CM

## 2022-06-22 DIAGNOSIS — I27.20 PULMONARY HYPERTENSION: ICD-10-CM

## 2022-06-22 DIAGNOSIS — E78.2 MIXED HYPERLIPIDEMIA: ICD-10-CM

## 2022-06-22 DIAGNOSIS — R06.02 SHORTNESS OF BREATH: ICD-10-CM

## 2022-06-22 DIAGNOSIS — R00.0 TACHYCARDIA: ICD-10-CM

## 2022-06-22 DIAGNOSIS — R60.9 PERIPHERAL EDEMA: ICD-10-CM

## 2022-06-22 DIAGNOSIS — I10 ESSENTIAL HYPERTENSION: Primary | ICD-10-CM

## 2022-06-22 PROCEDURE — 99214 OFFICE O/P EST MOD 30 MIN: CPT | Performed by: NURSE PRACTITIONER

## 2022-06-22 RX ORDER — GABAPENTIN 100 MG/1
100 CAPSULE ORAL 3 TIMES DAILY
COMMUNITY

## 2022-06-22 RX ORDER — ATENOLOL 25 MG/1
25 TABLET ORAL DAILY
Qty: 90 TABLET | Refills: 3 | Status: SHIPPED | OUTPATIENT
Start: 2022-06-22

## 2022-06-22 RX ORDER — TRAMADOL HYDROCHLORIDE 50 MG/1
50 TABLET ORAL
COMMUNITY
Start: 2022-05-04

## 2022-06-22 NOTE — PROGRESS NOTES
Subjective   Karyna Stewart is a 66 y.o. female     Chief Complaint   Patient presents with   • Shortness of Breath     6 month f/u       HPI    PROBLEM LIST:     1. Chest pain   1.1 Stress Test 2/22/17 - mild lateral wall ischemia; preserved LVEF; positive study without high risk markers   1.2 LHC 3/26/18 - normal coronary arteries; normal right heart pressures, no pulmonary HTN  2. Shortness of breath   2.1 Echo 2/22/17 - mild LVH; EF 60-65%; DD II; mild MR, TR and ME; PA 20-25  2.2 echo 11/2/2020-EF 60 to 65%, mild to moderate LVH  3. Edema  4. Chronic Cough   5. Hypertension   6. Hyperlipidemia   7. Dizziness/ lightheadedness   8. Rheumatoid arthritis, lupus, scleroderma  9. Diabetes; diet controlled   10. IBS   11. GERD  12.  Palpitations  12.1 event monitor 10/1-10/14/2020-normal sinus rhythm, sinus arrhythmia    Patient is a 66-year-old female who presents today for a follow-up.  She has been having chest tightness, drainage, cough, shortness of breath, palpitations and fatigue since she has not been able to have her allergy shots for 4 months.  When she was here last she had a tooth infection that actually ended up being into the jawbone.  She had upper osteomyelitis and had to have a PICC line and surgery.  She is starting back at her allergy shots today.  She does have some dizziness again since having her allergy shots help.  She denies any presyncope, syncope, orthopnea or PND.  She does get some swelling in her legs but her Dyazide works well.  Patient has had shortness of breath again its been a little more with increased activity due to her allergies.    We went over her most recent labs.      Current Outpatient Medications on File Prior to Visit   Medication Sig Dispense Refill   • albuterol (PROAIR RESPICLICK) 108 (90 Base) MCG/ACT inhaler Inhale 2 puffs Every 4 (Four) Hours As Needed for Wheezing.     • amLODIPine (NORVASC) 10 MG tablet Take 1 tablet by mouth Daily. 90 tablet 3   • aspirin 81  MG chewable tablet Chew 1 tablet Daily. 30 tablet 11   • dexlansoprazole (Dexilant) 30 MG capsule Take 30 mg by mouth Daily.     • DULoxetine (CYMBALTA) 30 MG capsule Take 30 mg by mouth Daily.     • gabapentin (NEURONTIN) 100 MG capsule Take 100 mg by mouth 3 (Three) Times a Day.     • isosorbide mononitrate (IMDUR) 30 MG 24 hr tablet Take 0.5 tablets by mouth Daily. 45 tablet 3   • leflunomide (ARAVA) 10 MG tablet Take 10 mg by mouth Daily.     • losartan (COZAAR) 50 MG tablet Take 1 tablet by mouth once daily 90 tablet 0   • meloxicam (MOBIC) 15 MG tablet Take 15 mg by mouth Daily.     • montelukast (SINGULAIR) 10 MG tablet Take 10 mg by mouth Every Night.     • Naproxen Sodium (ALEVE PO) Take  by mouth. PRN     • nitroglycerin (NITROSTAT) 0.4 MG SL tablet PLACE ONE TABLET UNDER THE TONGUE AS NEEDED FOR ANGINA MAY REPEAT EVERY 5 MINUTES FOR UP TO THREE DOSES 25 tablet 0   • potassium chloride (K-TAB) 20 MEQ tablet controlled-release ER tablet Take 1 tablet by mouth Daily. 90 tablet 3   • predniSONE (DELTASONE) 5 MG tablet Take 5 mg by mouth Daily.     • torsemide (DEMADEX) 20 MG tablet Take 1 tablet by mouth Daily As Needed (edema). 90 tablet 3   • traMADol (ULTRAM) 50 MG tablet Take 50 mg by mouth.     • triamterene-hydrochlorothiazide (DYAZIDE) 37.5-25 MG per capsule Take 1 capsule by mouth Every Morning.     • [DISCONTINUED] atenolol (TENORMIN) 25 MG tablet Take 1 tablet by mouth Daily. 90 tablet 3   • levocetirizine (XYZAL) 5 MG tablet Take 5 mg by mouth Every Evening.     • [DISCONTINUED] sucralfate (CARAFATE) 1 g tablet 2 (Two) Times a Day.       No current facility-administered medications on file prior to visit.       ALLERGIES    Diovan [valsartan] and Iodine    Past Medical History:   Diagnosis Date   • Anxiety and depression    • Arthritis    • Asthma    • Chest pain    • Chronic cough    • COVID-19 vaccine administered 1st - 03/11/2021    2nd - 04/15/2021 - Moderna ( Booster scheduled for 12/23/2021  Moderna )    • Diabetes (HCC)    • Fibromyalgia    • GERD (gastroesophageal reflux disease)    • High triglycerides    • Hypertension    • Irritable bowel syndrome    • Lupus (systemic lupus erythematosus) ([facility])    • Migraines    • Scleroderma ([facility])    • Seasonal allergies    • Ulcerative colitis ([facility])        Social History     Socioeconomic History   • Marital status:    • Number of children: 0   Tobacco Use   • Smoking status: Former Smoker     Types: Cigarettes     Quit date:      Years since quittin.4   • Smokeless tobacco: Never Used   • Tobacco comment: quit 15 yrs ago    Substance and Sexual Activity   • Alcohol use: No   • Drug use: No   • Sexual activity: Defer       Family History   Problem Relation Age of Onset   • COPD Mother    • Hypertension Mother    • Diverticulitis Father 45   • No Known Problems Son    • No Known Problems Daughter    • Stomach cancer Paternal Grandmother        Review of Systems   Constitutional: Positive for fatigue. Negative for chills, diaphoresis and fever.   HENT: Positive for rhinorrhea. Negative for postnasal drip and sneezing.         Sinus and allergy issues   Eyes: Positive for visual disturbance (needing new prescription).   Respiratory: Positive for cough (a lot of drainage ), chest tightness, shortness of breath (with increased activity) and wheezing. Negative for apnea.         Increased wheezing and cough due to not having allergy shots in 4 months, held for mandible surgery. Will be able to start back today.    Cardiovascular: Positive for palpitations (occas racing with increased activity' due to allergy flare-up as been off of shots due to jaw surgery ) and leg swelling (ankles ). Negative for chest pain.   Gastrointestinal: Negative for abdominal pain, blood in stool, constipation, diarrhea, nausea and vomiting.   Endocrine: Negative for cold intolerance and heat intolerance.   Genitourinary: Negative for hematuria.   Musculoskeletal: Positive  "for arthralgias, back pain, gait problem (uses a cane), myalgias and neck pain.   Skin: Negative.    Allergic/Immunologic: Positive for environmental allergies. Negative for food allergies.   Neurological: Positive for dizziness (worse since hold allergy shots and with quick movement), weakness (generalized) and headaches (migraines). Negative for syncope, light-headedness and numbness.   Hematological: Bruises/bleeds easily.   Psychiatric/Behavioral: Positive for sleep disturbance (sleeps only a few hours).       Objective   /73 (BP Location: Left arm, Patient Position: Sitting)   Pulse 80   Ht 152.4 cm (60\")   Wt 107 kg (236 lb)   SpO2 96%   BMI 46.09 kg/m²   Vitals:    06/22/22 0937   BP: 129/73   BP Location: Left arm   Patient Position: Sitting   Pulse: 80   SpO2: 96%   Weight: 107 kg (236 lb)   Height: 152.4 cm (60\")      Lab Results (most recent)     None        Physical Exam  Vitals reviewed.   Constitutional:       General: She is awake.      Appearance: Normal appearance. She is well-developed and well-groomed. She is morbidly obese.   HENT:      Head: Normocephalic.   Eyes:      General: Lids are normal.      Comments: Wears glasses    Neck:      Vascular: No carotid bruit, hepatojugular reflux or JVD.   Cardiovascular:      Rate and Rhythm: Normal rate and regular rhythm.      Pulses:           Radial pulses are 2+ on the right side and 2+ on the left side.        Dorsalis pedis pulses are 2+ on the right side and 2+ on the left side.        Posterior tibial pulses are 2+ on the right side and 2+ on the left side.      Heart sounds: Normal heart sounds.   Pulmonary:      Effort: Pulmonary effort is normal.      Breath sounds: Normal breath sounds and air entry.   Abdominal:      General: Bowel sounds are normal.      Palpations: Abdomen is soft.   Musculoskeletal:      Right lower leg: No edema.      Left lower leg: No edema.      Comments: Uses a cane    Skin:     General: Skin is warm and " dry.      Comments: Left jaw scar    Neurological:      Mental Status: She is alert and oriented to person, place, and time.   Psychiatric:         Attention and Perception: Attention and perception normal.         Mood and Affect: Mood and affect normal.         Speech: Speech normal.         Behavior: Behavior normal. Behavior is cooperative.         Thought Content: Thought content normal.         Cognition and Memory: Cognition and memory normal.         Judgment: Judgment normal.         Procedure   Procedures         Assessment & Plan      Diagnosis Plan   1. Essential hypertension  atenolol (TENORMIN) 25 MG tablet   2. Grade II diastolic dysfunction     3. Mixed hyperlipidemia     4. Shortness of breath     5. Pulmonary hypertension (HCC)     6. Peripheral edema     7. Palpitations  atenolol (TENORMIN) 25 MG tablet   8. Tachycardia  atenolol (TENORMIN) 25 MG tablet       Return in about 3 months (around 9/22/2022).    Hypertension-patient's on amlodipine, atenolol, losartan and Dyazide and doing well.  Diastolic dysfunction/peripheral edema-patient's on Dyazide and torsemide with no signs of failure.  Hyperlipidemia-patient is diet controlled.  Shortness of breath-stable.  Pulmonary hypertension-patient sees pulmonologist, Dr. Hameed.  Palpitations-stable on beta-blocker.  She will continue her medication regimen.  She will follow-up in 3 months or sooner if any changes.       Advance Care Planning   ACP discussion was held with the patient during this visit. Patient does not have an advance directive, information provided.       Electronically signed by:

## 2022-09-06 DIAGNOSIS — R07.2 PRECORDIAL PAIN: ICD-10-CM

## 2022-09-06 RX ORDER — ISOSORBIDE MONONITRATE 30 MG/1
TABLET, EXTENDED RELEASE ORAL
Qty: 45 TABLET | Refills: 1 | Status: SHIPPED | OUTPATIENT
Start: 2022-09-06 | End: 2022-10-18 | Stop reason: SDUPTHER

## 2022-10-18 ENCOUNTER — TELEPHONE (OUTPATIENT)
Dept: CARDIOLOGY | Facility: CLINIC | Age: 67
End: 2022-10-18

## 2022-10-18 ENCOUNTER — OFFICE VISIT (OUTPATIENT)
Dept: CARDIOLOGY | Facility: CLINIC | Age: 67
End: 2022-10-18

## 2022-10-18 ENCOUNTER — LAB (OUTPATIENT)
Dept: CARDIOLOGY | Facility: CLINIC | Age: 67
End: 2022-10-18

## 2022-10-18 VITALS
HEIGHT: 60 IN | OXYGEN SATURATION: 98 % | DIASTOLIC BLOOD PRESSURE: 76 MMHG | BODY MASS INDEX: 44.96 KG/M2 | WEIGHT: 229 LBS | HEART RATE: 71 BPM | TEMPERATURE: 97.8 F | SYSTOLIC BLOOD PRESSURE: 116 MMHG

## 2022-10-18 DIAGNOSIS — R07.2 PRECORDIAL PAIN: ICD-10-CM

## 2022-10-18 DIAGNOSIS — Z86.16 HISTORY OF COVID-19: ICD-10-CM

## 2022-10-18 DIAGNOSIS — R79.89 POSITIVE D DIMER: ICD-10-CM

## 2022-10-18 DIAGNOSIS — I51.89 GRADE II DIASTOLIC DYSFUNCTION: ICD-10-CM

## 2022-10-18 DIAGNOSIS — R60.9 PERIPHERAL EDEMA: ICD-10-CM

## 2022-10-18 DIAGNOSIS — R06.02 SHORTNESS OF BREATH: ICD-10-CM

## 2022-10-18 DIAGNOSIS — R00.2 PALPITATIONS: ICD-10-CM

## 2022-10-18 DIAGNOSIS — E78.2 MIXED HYPERLIPIDEMIA: ICD-10-CM

## 2022-10-18 DIAGNOSIS — R79.89 POSITIVE D DIMER: Primary | ICD-10-CM

## 2022-10-18 DIAGNOSIS — R07.2 PRECORDIAL PAIN: Primary | ICD-10-CM

## 2022-10-18 DIAGNOSIS — I10 ESSENTIAL HYPERTENSION: ICD-10-CM

## 2022-10-18 DIAGNOSIS — I27.20 PULMONARY HYPERTENSION: ICD-10-CM

## 2022-10-18 LAB
ANION GAP SERPL CALCULATED.3IONS-SCNC: 15.3 MMOL/L (ref 5–15)
ANISOCYTOSIS BLD QL: NORMAL
BASOPHILS # BLD AUTO: 0.06 10*3/MM3 (ref 0–0.2)
BASOPHILS NFR BLD AUTO: 0.7 % (ref 0–1.5)
BUN SERPL-MCNC: 14 MG/DL (ref 8–23)
BUN/CREAT SERPL: 16.5 (ref 7–25)
CALCIUM SPEC-SCNC: 9.1 MG/DL (ref 8.6–10.5)
CHLORIDE SERPL-SCNC: 102 MMOL/L (ref 98–107)
CO2 SERPL-SCNC: 26.7 MMOL/L (ref 22–29)
CREAT SERPL-MCNC: 0.85 MG/DL (ref 0.57–1)
D DIMER PPP FEU-MCNC: 1.43 MCGFEU/ML (ref 0–0.5)
DEPRECATED RDW RBC AUTO: 60.7 FL (ref 37–54)
EGFRCR SERPLBLD CKD-EPI 2021: 75.7 ML/MIN/1.73
ELLIPTOCYTES BLD QL SMEAR: NORMAL
EOSINOPHIL # BLD AUTO: 0.09 10*3/MM3 (ref 0–0.4)
EOSINOPHIL NFR BLD AUTO: 1 % (ref 0.3–6.2)
ERYTHROCYTE [DISTWIDTH] IN BLOOD BY AUTOMATED COUNT: 22.8 % (ref 12.3–15.4)
GLUCOSE SERPL-MCNC: 100 MG/DL (ref 65–99)
HCT VFR BLD AUTO: 40.7 % (ref 34–46.6)
HGB BLD-MCNC: 12.1 G/DL (ref 12–15.9)
HYPOCHROMIA BLD QL: NORMAL
IMM GRANULOCYTES # BLD AUTO: 0.03 10*3/MM3 (ref 0–0.05)
IMM GRANULOCYTES NFR BLD AUTO: 0.3 % (ref 0–0.5)
LYMPHOCYTES # BLD AUTO: 1.67 10*3/MM3 (ref 0.7–3.1)
LYMPHOCYTES NFR BLD AUTO: 19.2 % (ref 19.6–45.3)
MAGNESIUM SERPL-MCNC: 2 MG/DL (ref 1.6–2.4)
MCH RBC QN AUTO: 22.8 PG (ref 26.6–33)
MCHC RBC AUTO-ENTMCNC: 29.7 G/DL (ref 31.5–35.7)
MCV RBC AUTO: 76.8 FL (ref 79–97)
MICROCYTES BLD QL: NORMAL
MONOCYTES # BLD AUTO: 0.54 10*3/MM3 (ref 0.1–0.9)
MONOCYTES NFR BLD AUTO: 6.2 % (ref 5–12)
NEUTROPHILS NFR BLD AUTO: 6.31 10*3/MM3 (ref 1.7–7)
NEUTROPHILS NFR BLD AUTO: 72.6 % (ref 42.7–76)
NRBC BLD AUTO-RTO: 0 /100 WBC (ref 0–0.2)
PLATELET # BLD AUTO: 584 10*3/MM3 (ref 140–450)
PMV BLD AUTO: 8.9 FL (ref 6–12)
POTASSIUM SERPL-SCNC: 3.5 MMOL/L (ref 3.5–5.2)
RBC # BLD AUTO: 5.3 10*6/MM3 (ref 3.77–5.28)
SMALL PLATELETS BLD QL SMEAR: NORMAL
SODIUM SERPL-SCNC: 144 MMOL/L (ref 136–145)
T4 FREE SERPL-MCNC: 0.82 NG/DL (ref 0.93–1.7)
TSH SERPL DL<=0.05 MIU/L-ACNC: 0.34 UIU/ML (ref 0.27–4.2)
WBC NRBC COR # BLD: 8.7 10*3/MM3 (ref 3.4–10.8)

## 2022-10-18 PROCEDURE — 85025 COMPLETE CBC W/AUTO DIFF WBC: CPT | Performed by: NURSE PRACTITIONER

## 2022-10-18 PROCEDURE — 83735 ASSAY OF MAGNESIUM: CPT | Performed by: NURSE PRACTITIONER

## 2022-10-18 PROCEDURE — 80048 BASIC METABOLIC PNL TOTAL CA: CPT | Performed by: NURSE PRACTITIONER

## 2022-10-18 PROCEDURE — 85379 FIBRIN DEGRADATION QUANT: CPT | Performed by: NURSE PRACTITIONER

## 2022-10-18 PROCEDURE — 85007 BL SMEAR W/DIFF WBC COUNT: CPT | Performed by: NURSE PRACTITIONER

## 2022-10-18 PROCEDURE — 84439 ASSAY OF FREE THYROXINE: CPT | Performed by: NURSE PRACTITIONER

## 2022-10-18 PROCEDURE — 84443 ASSAY THYROID STIM HORMONE: CPT | Performed by: NURSE PRACTITIONER

## 2022-10-18 PROCEDURE — 99214 OFFICE O/P EST MOD 30 MIN: CPT | Performed by: NURSE PRACTITIONER

## 2022-10-18 PROCEDURE — 84481 FREE ASSAY (FT-3): CPT | Performed by: NURSE PRACTITIONER

## 2022-10-18 RX ORDER — PREDNISONE 50 MG/1
TABLET ORAL
Qty: 3 TABLET | Refills: 0 | Status: SHIPPED | OUTPATIENT
Start: 2022-10-18

## 2022-10-18 RX ORDER — ISOSORBIDE MONONITRATE 30 MG/1
15 TABLET, EXTENDED RELEASE ORAL DAILY
Qty: 45 TABLET | Refills: 11 | Status: SHIPPED | OUTPATIENT
Start: 2022-10-18

## 2022-10-18 RX ORDER — PENTOXIFYLLINE 400 MG/1
400 TABLET, EXTENDED RELEASE ORAL 2 TIMES DAILY
COMMUNITY

## 2022-10-18 RX ORDER — LOSARTAN POTASSIUM 50 MG/1
50 TABLET ORAL DAILY
Qty: 90 TABLET | Refills: 3 | Status: SHIPPED | OUTPATIENT
Start: 2022-10-18

## 2022-10-18 NOTE — TELEPHONE ENCOUNTER
----- Message from KALA August sent at 10/18/2022  4:13 PM EDT -----  Patient needs CTA of chest today.  She had BMP at [facility] 10/8 0.81.  Thanks       Medication sent to [facility] on file for contrast allergies     BERTHA Cook

## 2022-10-18 NOTE — PROGRESS NOTES
Subjective   Karyna Stewart is a 66 y.o. female     Chief Complaint   Patient presents with   • Follow-up     Hypertension        HPI    PROBLEM LIST:     1. Chest pain   1.1 Stress Test 2/22/17 - mild lateral wall ischemia; preserved LVEF; positive study without high risk markers   1.2 LHC 3/26/18 - normal coronary arteries; normal right heart pressures, no pulmonary HTN  2. Shortness of breath   2.1 Echo 2/22/17 - mild LVH; EF 60-65%; DD II; mild MR, TR and VA; PA 20-25  2.2 echo 11/2/2020-EF 60 to 65%, mild to moderate LVH  3. Edema  4. Chronic Cough   5. Hypertension   6. Hyperlipidemia   7. Dizziness/ lightheadedness   8. Rheumatoid arthritis, lupus, scleroderma  9. Diabetes; diet controlled   10. IBS   11. GERD  12.  Palpitations  12.1 event monitor 10/1-10/14/2020-normal sinus rhythm, sinus arrhythmia  13.  History of COVID-19 9/2022    Patient is a 66-year-old female who presents today for follow-up.  Patient had COVID back in September and has had an increase in symptoms since.  She says she gets a cramp across her chest that happens anytime.  She says it does not radiate but it decreases the heaviness she feels.  She is not sure how often because she has so much going on she just cannot keep up with what goes with what.  She says she does have palpitations and she has had them daily since having COVID but she was on steroids prior.  She also says she did miss her medications for 3 days and she has been back on for couple days now.  She does have some dizziness and lightheadedness with COVID as well.  She denies any presyncope, syncope, orthopnea or PND.  She gets swelling that does not go down overnight but she again had not been taking her medication so it has improved.  Patient does have shortness of breath with very minimal activity since having COVID.  She also has increasing fatigue.  Patient asked about oxygen.  I advised that needs to be prescribed either by PCP or pulmonary.  She has not seen  Dr. Hameed in a few years so we will refer her back to recommend if she can get in quicker with PCP she may want to do that the oxygen prior to seeing pulmonary.    Chest x-ray was good that she had in the hospital and she was negative for DVT.  She did have a potassium of 3.4 and her CRP was elevated.    Current Outpatient Medications on File Prior to Visit   Medication Sig Dispense Refill   • albuterol (PROAIR RESPICLICK) 108 (90 Base) MCG/ACT inhaler Inhale 2 puffs Every 4 (Four) Hours As Needed for Wheezing.     • amLODIPine (NORVASC) 10 MG tablet Take 1 tablet by mouth Daily. 90 tablet 3   • aspirin 81 MG chewable tablet Chew 1 tablet Daily. 30 tablet 11   • atenolol (TENORMIN) 25 MG tablet Take 1 tablet by mouth Daily. 90 tablet 3   • dexlansoprazole (Dexilant) 30 MG capsule Take 30 mg by mouth Daily.     • DULoxetine (CYMBALTA) 30 MG capsule Take 30 mg by mouth Daily.     • gabapentin (NEURONTIN) 100 MG capsule Take 100 mg by mouth 3 (Three) Times a Day.     • leflunomide (ARAVA) 10 MG tablet Take 10 mg by mouth Daily.     • levocetirizine (XYZAL) 5 MG tablet Take 5 mg by mouth Every Evening.     • meloxicam (MOBIC) 15 MG tablet Take 15 mg by mouth Daily.     • montelukast (SINGULAIR) 10 MG tablet Take 10 mg by mouth Every Night.     • Naproxen Sodium (ALEVE PO) Take  by mouth. PRN     • nitroglycerin (NITROSTAT) 0.4 MG SL tablet PLACE ONE TABLET UNDER THE TONGUE AS NEEDED FOR ANGINA MAY REPEAT EVERY 5 MINUTES FOR UP TO THREE DOSES 25 tablet 0   • pentoxifylline (TRENtal) 400 MG CR tablet Take 1 tablet by mouth 2 (Two) Times a Day.     • potassium chloride (K-TAB) 20 MEQ tablet controlled-release ER tablet Take 1 tablet by mouth Daily. 90 tablet 3   • predniSONE (DELTASONE) 5 MG tablet Take 5 mg by mouth Daily.     • torsemide (DEMADEX) 20 MG tablet Take 1 tablet by mouth Daily As Needed (edema). 90 tablet 3   • traMADol (ULTRAM) 50 MG tablet Take 50 mg by mouth.     • triamterene-hydrochlorothiazide (DYAZIDE)  37.5-25 MG per capsule Take 1 capsule by mouth Every Morning.     • [DISCONTINUED] isosorbide mononitrate (IMDUR) 30 MG 24 hr tablet Take 1/2 (one-half) tablet by mouth once daily 45 tablet 1   • [DISCONTINUED] losartan (COZAAR) 50 MG tablet Take 1 tablet by mouth once daily 90 tablet 0     No current facility-administered medications on file prior to visit.       ALLERGIES    Diovan [valsartan] and Iodine    Past Medical History:   Diagnosis Date   • Anxiety and depression    • Arthritis    • Asthma    • Chest pain    • Chronic cough    • COVID-19 2022   • COVID-19 vaccine administered 1st - 2021    2nd - 04/15/2021 - Moderna ( Booster scheduled for 2021 Moderna )    • Diabetes (HCC)    • Fibromyalgia    • GERD (gastroesophageal reflux disease)    • High triglycerides    • Hypertension    • Irritable bowel syndrome    • Lupus (systemic lupus erythematosus) (HCC)    • Migraines    • Scleroderma (HCC)    • Seasonal allergies    • Ulcerative colitis ([facility])        Social History     Socioeconomic History   • Marital status:    • Number of children: 0   Tobacco Use   • Smoking status: Former     Types: Cigarettes     Quit date:      Years since quittin.8   • Smokeless tobacco: Never   • Tobacco comments:     quit 15 yrs ago    Vaping Use   • Vaping Use: Never used   Substance and Sexual Activity   • Alcohol use: No   • Drug use: No   • Sexual activity: Defer       Family History   Problem Relation Age of Onset   • COPD Mother    • Hypertension Mother    • Diverticulitis Father 45   • No Known Problems Son    • No Known Problems Daughter    • Stomach cancer Paternal Grandmother        Review of Systems   Constitutional: Positive for appetite change (decreased appetite due to Covid 2022) and fatigue (fatgiued due to coivd 2022). Negative for chills, diaphoresis and fever.   HENT: Positive for congestion (head, nasal and chest fanny). Negative for rhinorrhea and sore throat.     Eyes: Positive for visual disturbance (glasses).   Respiratory: Positive for cough (due to covid ) and shortness of breath (with any type of activity or with any type of movement ). Negative for chest tightness and wheezing.    Cardiovascular: Positive for chest pain (across the chest ( cramps ) it happens at random times; does not rad; decreases the heaviness she feels; not sure how often it is occuring ), palpitations (races for the past 3 weeks since Covid; was on steroids prior to covid; every day ) and leg swelling (ankles ( swelling does not go down; had not been taking meds for 3 days due to being so sick ).   Gastrointestinal: Positive for abdominal pain (across her lower stomach ) and constipation (due to not being able to eat ). Negative for blood in stool, diarrhea, nausea and vomiting.   Endocrine: Negative for cold intolerance and heat intolerance.   Genitourinary: Positive for frequency (several times a day and night due to her bladder stimalter ). Negative for difficulty urinating, dysuria, hematuria and urgency.   Musculoskeletal: Positive for arthralgias (all over the body ), back pain (lower back ), joint swelling (knees, ankles , arms, shoulders, wrists , hands and fingers ) and neck stiffness (stiffness at times ). Negative for neck pain.   Skin: Positive for wound (lower stomach ). Negative for color change, pallor and rash.   Allergic/Immunologic: Negative for environmental allergies and food allergies.   Neurological: Positive for dizziness (anything triggers it worse since having Covid 09/28/202), weakness (weakness all over with no type of energy due to coivd), light-headedness (goes along with the diZziness ), numbness (left side of the face ) and headaches (H/O of H/A worse since having Covid ). Negative for syncope.   Hematological: Bruises/bleeds easily (Brusies easy ).   Psychiatric/Behavioral: Negative for sleep disturbance.       Objective   /76 (BP Location: Left arm, Patient  "Position: Sitting)   Pulse 71   Temp 97.8 °F (36.6 °C)   Ht 152.4 cm (60\")   Wt 104 kg (229 lb)   SpO2 98%   BMI 44.72 kg/m²   Vitals:    10/18/22 0952   BP: 116/76   BP Location: Left arm   Patient Position: Sitting   Pulse: 71   Temp: 97.8 °F (36.6 °C)   SpO2: 98%   Weight: 104 kg (229 lb)   Height: 152.4 cm (60\")      Lab Results (most recent)     None        Physical Exam  Vitals reviewed.   Constitutional:       General: She is awake.      Appearance: Normal appearance. She is well-developed and well-groomed. She is morbidly obese.   HENT:      Head: Normocephalic.   Eyes:      General: Lids are normal.      Comments: Wears glasses    Neck:      Vascular: No carotid bruit, hepatojugular reflux or JVD.   Cardiovascular:      Rate and Rhythm: Normal rate and regular rhythm.      Pulses:           Radial pulses are 2+ on the right side and 2+ on the left side.        Dorsalis pedis pulses are 2+ on the right side and 2+ on the left side.        Posterior tibial pulses are 2+ on the right side and 2+ on the left side.      Heart sounds: Normal heart sounds.   Pulmonary:      Effort: Pulmonary effort is normal.      Breath sounds: Normal breath sounds and air entry.   Abdominal:      General: Bowel sounds are normal.      Palpations: Abdomen is soft.   Musculoskeletal:      Right hip: Tenderness present.      Right lower leg: No edema.      Left lower leg: No edema.      Comments: Pain/burning right hip; uses a cane    Skin:     General: Skin is warm and dry.   Neurological:      Mental Status: She is alert and oriented to person, place, and time.   Psychiatric:         Attention and Perception: Attention and perception normal.         Mood and Affect: Mood and affect normal.         Speech: Speech normal.         Behavior: Behavior normal. Behavior is cooperative.         Thought Content: Thought content normal.         Cognition and Memory: Cognition and memory normal.         Judgment: Judgment normal. "         Procedure   Procedures         Assessment & Plan      Diagnosis Plan   1. Precordial pain  isosorbide mononitrate (IMDUR) 30 MG 24 hr tablet    Adult Transthoracic Echo Complete W/ Cont if Necessary Per Protocol    D-dimer, Quantitative    Stress Test With Myocardial Perfusion One Day      2. Essential hypertension  losartan (COZAAR) 50 MG tablet    Adult Transthoracic Echo Complete W/ Cont if Necessary Per Protocol    Basic Metabolic Panel    CBC & Differential    Stress Test With Myocardial Perfusion One Day      3. Grade II diastolic dysfunction  Adult Transthoracic Echo Complete W/ Cont if Necessary Per Protocol      4. Mixed hyperlipidemia  Stress Test With Myocardial Perfusion One Day      5. Shortness of breath  Adult Transthoracic Echo Complete W/ Cont if Necessary Per Protocol    D-dimer, Quantitative    Ambulatory Referral to Pulmonology    Stress Test With Myocardial Perfusion One Day      6. Pulmonary hypertension (HCC)  Adult Transthoracic Echo Complete W/ Cont if Necessary Per Protocol    Ambulatory Referral to Pulmonology      7. Peripheral edema        8. Palpitations  Adult Transthoracic Echo Complete W/ Cont if Necessary Per Protocol    Magnesium    TSH    T3, Free    T4, Free    Stress Test With Myocardial Perfusion One Day      9. History of COVID-19  Adult Transthoracic Echo Complete W/ Cont if Necessary Per Protocol    Ambulatory Referral to Pulmonology    Stress Test With Myocardial Perfusion One Day          Return in about 12 weeks (around 1/10/2023).    Chest pain/hypertension/diastolic dysfunction/hyperlipidemia/shortness of breath/pulmonary hypertension/palpitation/history of COVID-patient will have an ischemia work-up, stress and echo.  Patient uses a cane and has severe shortness of breath so she is unable to walk on a treadmill.  She was encouraged to use nitro as needed for chest pain no resolution to go to the ER.  She will get a D-dimer, BNP, CBC, mag, TSH, free T3 and free  T4 today.  Peripheral edema/diastolic dysfunction-patient's on torsemide.  She will continue her medication regimen.  She will follow-up in 12 weeks or sooner if any changes or abnormalities with testing.       Karyna A Pat  reports that she quit smoking about 20 years ago. Her smoking use included cigarettes. She has never used smokeless tobacco.. Advance Care Planning   ACP discussion was declined by the patient. Patient does not have an advance directive, declines further assistance.          Electronically signed by:

## 2022-10-19 ENCOUNTER — TELEPHONE (OUTPATIENT)
Dept: CARDIOLOGY | Facility: CLINIC | Age: 67
End: 2022-10-19

## 2022-10-19 LAB — T3FREE SERPL-MCNC: 2.17 PG/ML (ref 2–4.4)

## 2022-10-19 NOTE — TELEPHONE ENCOUNTER
----- Message from KALA August sent at 10/19/2022  6:27 AM EDT -----  Please advise patient that her white blood cells, hemoglobin hematocrit are within normal range her does look like she has some anemia results will be forwarded to PCP.  Patient's TSH is then within normal range however her free T4 is slightly low.  Her creatinine and potassium are good.  Magnesium is also within normal range.  These have been forwarded again to PCP.        PT was advised of the above mess / labs results pt advised to contact PCP and that results where forwarded to PCP     Labs ( all other labs WNL)     Free T4 0.93 - 1.70 ng/dL 0.82 Low      BERTHA Cook

## 2022-10-20 ENCOUNTER — TELEPHONE (OUTPATIENT)
Dept: CARDIOLOGY | Facility: CLINIC | Age: 67
End: 2022-10-20

## 2022-10-20 NOTE — TELEPHONE ENCOUNTER
----- Message from KALA August sent at 10/20/2022  9:59 AM EDT -----  Based on the CTA of the chest a small thrombus cannot be ruled out therefore patient's known history of COVID and elevated D-dimer we will go ahead and start her on anticoagulation.      Have her start Eliquis 10 mg PO twice a day for 7 days, then 5 mg PO twice a day.  She will need to be on this for 6 months.      Let her know we are unsure of the cost until goes through her insurance.  She can come by and  a 30-day free card.  I am forwarding the results to Dr. Hameed's office as well.        Called and advised pt of the above mess / results     Pt will come by and  samples and free 30 days     2 boxes of Eliquis 5 mg     Lot # [id number]  01/25    Medication sent to [facility] and 2 boxes of Eliquis given for the 7 days

## 2022-11-14 ENCOUNTER — TELEPHONE (OUTPATIENT)
Dept: CARDIOLOGY | Facility: CLINIC | Age: 67
End: 2022-11-14

## 2022-11-14 NOTE — TELEPHONE ENCOUNTER
Caller: Karyna Stewart    Relationship to patient: Self    Best call back number: [phone]    Type of visit: TESTING    If rescheduling, when is the original appointment: 12.05.22    Additional notes: PT IS CURRENTLY SCHEDULED IN [place] FOR STRESS TEST AND ECHO AND SHE IS WANTING TO KNOW IF SHE WERE TO [facility], IF IT WOULD PUSH HER TOO FAR BACK - [place] WOULD REQUIRE HER TO NEED A  AND SHE WOULD HAVE TO LEAVE HER HOUSE BY 6AM TO MAKE 8AM ECHO APPT - PT ALSO REPORTS SHE IS UNFAMILIAR WITH THE [place] AREA AND IS CONCERNED ABOUT GETTING TO OFFICE

## 2022-12-05 ENCOUNTER — HOSPITAL ENCOUNTER (OUTPATIENT)
Dept: CARDIOLOGY | Facility: HOSPITAL | Age: 67
Discharge: HOME | End: 2022-12-05

## 2022-12-05 VITALS — WEIGHT: 229.28 LBS | BODY MASS INDEX: 45.01 KG/M2 | HEIGHT: 60 IN

## 2022-12-05 DIAGNOSIS — Z86.16 HISTORY OF COVID-19: ICD-10-CM

## 2022-12-05 DIAGNOSIS — I27.20 PULMONARY HYPERTENSION: ICD-10-CM

## 2022-12-05 DIAGNOSIS — R07.2 PRECORDIAL PAIN: ICD-10-CM

## 2022-12-05 DIAGNOSIS — R06.02 SHORTNESS OF BREATH: ICD-10-CM

## 2022-12-05 DIAGNOSIS — R00.2 PALPITATIONS: ICD-10-CM

## 2022-12-05 DIAGNOSIS — I51.89 GRADE II DIASTOLIC DYSFUNCTION: ICD-10-CM

## 2022-12-05 DIAGNOSIS — E78.2 MIXED HYPERLIPIDEMIA: ICD-10-CM

## 2022-12-05 DIAGNOSIS — I10 ESSENTIAL HYPERTENSION: ICD-10-CM

## 2022-12-05 LAB
AORTIC DIMENSIONLESS INDEX: 0.88 (DI)
BH CV ECHO MEAS - ACS: 1.71 CM
BH CV ECHO MEAS - AO MAX PG: 7 MMHG
BH CV ECHO MEAS - AO MEAN PG: 3.7 MMHG
BH CV ECHO MEAS - AO ROOT DIAM: 3.2 CM
BH CV ECHO MEAS - AO V2 MAX: 132.3 CM/SEC
BH CV ECHO MEAS - AO V2 VTI: 30.9 CM
BH CV ECHO MEAS - EDV(CUBED): 86.5 ML
BH CV ECHO MEAS - ESV(CUBED): 23.2 ML
BH CV ECHO MEAS - FS: 35.5 %
BH CV ECHO MEAS - IVS/LVPW: 0.9 CM
BH CV ECHO MEAS - IVSD: 0.98 CM
BH CV ECHO MEAS - LA DIMENSION: 4.1 CM
BH CV ECHO MEAS - LAT PEAK E' VEL: 6.6 CM/SEC
BH CV ECHO MEAS - LV MASS(C)D: 157.4 GRAMS
BH CV ECHO MEAS - LV MAX PG: 5.4 MMHG
BH CV ECHO MEAS - LV MEAN PG: 2.9 MMHG
BH CV ECHO MEAS - LV V1 MAX: 116.5 CM/SEC
BH CV ECHO MEAS - LV V1 VTI: 31.3 CM
BH CV ECHO MEAS - LVIDD: 4.4 CM
BH CV ECHO MEAS - LVIDS: 2.9 CM
BH CV ECHO MEAS - LVPWD: 1.1 CM
BH CV ECHO MEAS - MED PEAK E' VEL: 6.9 CM/SEC
BH CV ECHO MEAS - MR MAX PG: 61 MMHG
BH CV ECHO MEAS - MR MAX VEL: 390.5 CM/SEC
BH CV ECHO MEAS - MV A MAX VEL: 81.1 CM/SEC
BH CV ECHO MEAS - MV DEC SLOPE: 640.4 CM/SEC2
BH CV ECHO MEAS - MV DEC TIME: 0.2 MSEC
BH CV ECHO MEAS - MV E MAX VEL: 102 CM/SEC
BH CV ECHO MEAS - MV E/A: 1.26
BH CV ECHO MEAS - MV MAX PG: 7.8 MMHG
BH CV ECHO MEAS - MV MEAN PG: 2.31 MMHG
BH CV ECHO MEAS - MV P1/2T: 60.1 MSEC
BH CV ECHO MEAS - MV V2 VTI: 43.9 CM
BH CV ECHO MEAS - MVA(P1/2T): 3.7 CM2
BH CV ECHO MEAS - PA V2 MAX: 109.2 CM/SEC
BH CV ECHO MEAS - RAP SYSTOLE: 10 MMHG
BH CV ECHO MEAS - RV MAX PG: 1.26 MMHG
BH CV ECHO MEAS - RV V1 MAX: 56.1 CM/SEC
BH CV ECHO MEAS - RV V1 VTI: 13.3 CM
BH CV ECHO MEAS - RVDD: 3.5 CM
BH CV ECHO MEAS - RVSP: 42.3 MMHG
BH CV ECHO MEAS - TAPSE (>1.6): 2.9 CM
BH CV ECHO MEAS - TR MAX PG: 32.3 MMHG
BH CV ECHO MEAS - TR MAX VEL: 284 CM/SEC
BH CV ECHO MEASUREMENTS AVERAGE E/E' RATIO: 15.11
BH CV REST NUCLEAR ISOTOPE DOSE: 10 MCI
BH CV STRESS COMMENTS STAGE 1: NORMAL
BH CV STRESS DOSE REGADENOSON STAGE 1: 0.4
BH CV STRESS DURATION MIN STAGE 1: 0
BH CV STRESS DURATION SEC STAGE 1: 10
BH CV STRESS NUCLEAR ISOTOPE DOSE: 30 MCI
BH CV STRESS PROTOCOL 1: NORMAL
BH CV STRESS RECOVERY BP: NORMAL MMHG
BH CV STRESS RECOVERY HR: 69 BPM
BH CV STRESS STAGE 1: 1
BH CV XLRA - TDI S': 16.2 CM/SEC
MAXIMAL PREDICTED HEART RATE: 153 BPM
MAXIMAL PREDICTED HEART RATE: 153 BPM
PERCENT MAX PREDICTED HR: 60.78 %
SINUS: 2.8 CM
STRESS BASELINE BP: NORMAL MMHG
STRESS BASELINE HR: 61 BPM
STRESS PERCENT HR: 72 %
STRESS POST PEAK BP: NORMAL MMHG
STRESS POST PEAK HR: 93 BPM
STRESS TARGET HR: 130 BPM
STRESS TARGET HR: 130 BPM

## 2022-12-05 PROCEDURE — 93018 CV STRESS TEST I&R ONLY: CPT | Performed by: INTERNAL MEDICINE

## 2022-12-05 PROCEDURE — 0 TECHNETIUM SESTAMIBI: Performed by: INTERNAL MEDICINE

## 2022-12-05 PROCEDURE — 78452 HT MUSCLE IMAGE SPECT MULT: CPT

## 2022-12-05 PROCEDURE — 93017 CV STRESS TEST TRACING ONLY: CPT

## 2022-12-05 PROCEDURE — 93306 TTE W/DOPPLER COMPLETE: CPT

## 2022-12-05 PROCEDURE — A9500 TC99M SESTAMIBI: HCPCS | Performed by: INTERNAL MEDICINE

## 2022-12-05 PROCEDURE — 93306 TTE W/DOPPLER COMPLETE: CPT | Performed by: INTERNAL MEDICINE

## 2022-12-05 PROCEDURE — 25010000002 REGADENOSON 0.4 MG/5ML SOLUTION: Performed by: INTERNAL MEDICINE

## 2022-12-05 PROCEDURE — 78452 HT MUSCLE IMAGE SPECT MULT: CPT | Performed by: INTERNAL MEDICINE

## 2022-12-05 RX ADMIN — TECHNETIUM TC 99M SESTAMIBI 1 DOSE: 1 INJECTION INTRAVENOUS at 08:21

## 2022-12-05 RX ADMIN — REGADENOSON 0.4 MG: 0.08 INJECTION, SOLUTION INTRAVENOUS at 10:40

## 2022-12-05 RX ADMIN — TECHNETIUM TC 99M SESTAMIBI 1 DOSE: 1 INJECTION INTRAVENOUS at 10:40

## 2022-12-06 ENCOUNTER — TELEPHONE (OUTPATIENT)
Dept: CARDIOLOGY | Facility: CLINIC | Age: 67
End: 2022-12-06

## 2022-12-06 NOTE — TELEPHONE ENCOUNTER
----- Message from KALA Howell sent at 12/6/2022  6:37 AM EST -----  Please advise patient, does have diastolic dysfunction which is the inability of the left ventricle to completely relax therefore can cause fluid back up into lungs, needs to make sure using Torsemide as needed.  Also pulmonary pressure is slightly elevated, who is she currently seeing for pulmonologist?  Please forward results to them thanks.       PT was advised of the above mess regarding her Echo results , . Pt states that she see's Dr Hameed so I advised her that we will send over results to her . Pt states that she takes the Torsemide daily and has apt with Dr Hameed ( Jan 2023)     Echo - ejection fraction is greater than 65%.     BERTHA Cook

## 2022-12-06 NOTE — PROGRESS NOTES
Please advise patient, does have diastolic dysfunction which is the inability of the left ventricle to completely relax therefore can cause fluid back up into lungs, needs to make sure using Torsemide as needed.  Also pulmonary pressure is slightly elevated, who is she currently seeing for pulmonologist?  Please forward results to them thanks.

## 2022-12-06 NOTE — TELEPHONE ENCOUNTER
STRESS  Pt notified of no acute findings. Provider will discuss results at f/u. Pt reminded of appt date and time.  ----- Message from KALA Howell sent at 12/5/2022  8:17 PM EST -----  Regarding: FW:  Please advise patient  ----- Message -----  From: Ryan Joaquin MD  Sent: 12/5/2022   8:10 PM EST  To: KALA Howell

## 2023-01-21 DIAGNOSIS — I10 ESSENTIAL HYPERTENSION: ICD-10-CM

## 2023-01-21 DIAGNOSIS — Z86.39 HISTORY OF LOW POTASSIUM: ICD-10-CM

## 2023-01-23 RX ORDER — POTASSIUM CHLORIDE 20 MEQ/1
TABLET, EXTENDED RELEASE ORAL
Qty: 90 TABLET | Refills: 3 | Status: SHIPPED | OUTPATIENT
Start: 2023-01-23

## 2023-01-23 RX ORDER — AMLODIPINE BESYLATE 10 MG/1
TABLET ORAL
Qty: 90 TABLET | Refills: 3 | Status: SHIPPED | OUTPATIENT
Start: 2023-01-23

## 2023-02-23 DIAGNOSIS — R60.9 PERIPHERAL EDEMA: ICD-10-CM

## 2023-02-23 DIAGNOSIS — I51.89 GRADE II DIASTOLIC DYSFUNCTION: ICD-10-CM

## 2023-02-23 RX ORDER — TORSEMIDE 20 MG/1
20 TABLET ORAL DAILY PRN
Qty: 90 TABLET | Refills: 3 | Status: SHIPPED | OUTPATIENT
Start: 2023-02-23

## 2023-02-23 NOTE — TELEPHONE ENCOUNTER
Caller: Karyna Stewart    Relationship: Self    Best call back number: [phone]    Requested Prescriptions:   Requested Prescriptions     Pending Prescriptions Disp Refills   • torsemide (DEMADEX) 20 MG tablet 90 tablet 3     Sig: Take 1 tablet by mouth Daily As Needed (edema).        Pharmacy where request should be sent: [facility] [address] [place] [address] [phone] [facility] [phone] FX     Additional details provided by patient: COMPLETELY OUT     Does the patient have less than a 3 day supply:  [x] Yes  [] No    Would you like a call back once the refill request has been completed: [] Yes [x] No    If the office needs to give you a call back, can they leave a voicemail: [] Yes [x] No    Agustín Marinelli Rep   02/23/23 08:04 EST

## 2023-03-22 ENCOUNTER — TELEPHONE (OUTPATIENT)
Dept: CARDIOLOGY | Facility: CLINIC | Age: 68
End: 2023-03-22
Payer: MEDICARE

## 2023-03-22 NOTE — TELEPHONE ENCOUNTER
For the HUB to read to pt:       LVM informing patient that [facility] has relocated out of state. If patient calls back, please schedule her to see any provider of her choosing. Thank you

## 2023-06-01 ENCOUNTER — TELEPHONE (OUTPATIENT)
Dept: CARDIOLOGY | Facility: CLINIC | Age: 68
End: 2023-06-01

## 2023-06-01 NOTE — TELEPHONE ENCOUNTER
For the HUB to read to pt:       LVM TO CONFIRM APPT, IF PT CALLS BACK PLEASE PUT THROUGH, THANK YOU

## 2023-06-02 ENCOUNTER — OFFICE VISIT (OUTPATIENT)
Dept: CARDIOLOGY | Facility: CLINIC | Age: 68
End: 2023-06-02
Payer: MEDICARE

## 2023-06-02 VITALS
HEIGHT: 60 IN | DIASTOLIC BLOOD PRESSURE: 70 MMHG | BODY MASS INDEX: 43.59 KG/M2 | SYSTOLIC BLOOD PRESSURE: 132 MMHG | WEIGHT: 222 LBS | HEART RATE: 60 BPM | OXYGEN SATURATION: 95 %

## 2023-06-02 DIAGNOSIS — I51.89 GRADE II DIASTOLIC DYSFUNCTION: ICD-10-CM

## 2023-06-02 DIAGNOSIS — R60.9 PERIPHERAL EDEMA: ICD-10-CM

## 2023-06-02 DIAGNOSIS — R00.2 PALPITATIONS: ICD-10-CM

## 2023-06-02 DIAGNOSIS — I10 ESSENTIAL HYPERTENSION: ICD-10-CM

## 2023-06-02 DIAGNOSIS — R00.0 TACHYCARDIA: ICD-10-CM

## 2023-06-02 RX ORDER — ATENOLOL 25 MG/1
25 TABLET ORAL DAILY
Qty: 90 TABLET | Refills: 3 | Status: SHIPPED | OUTPATIENT
Start: 2023-06-02

## 2023-06-02 RX ORDER — TORSEMIDE 20 MG/1
20 TABLET ORAL DAILY
Qty: 90 TABLET | Refills: 3 | Status: SHIPPED | OUTPATIENT
Start: 2023-06-02

## 2023-06-02 RX ORDER — LOSARTAN POTASSIUM 50 MG/1
50 TABLET ORAL DAILY
Qty: 90 TABLET | Refills: 3 | Status: SHIPPED | OUTPATIENT
Start: 2023-06-02

## 2023-06-02 NOTE — PROGRESS NOTES
Subjective     Karyna Stewart is a 67 y.o. female who presents to day for Hypertension (6 month f/u) and Shortness of Breath.    CHIEF COMPLIANT  Chief Complaint   Patient presents with    Hypertension     6 month f/u    Shortness of Breath       Active Problems:  Problem List Items Addressed This Visit          Cardiac and Vasculature    Essential hypertension    Relevant Medications    losartan (COZAAR) 50 MG tablet    atenolol (TENORMIN) 25 MG tablet    torsemide (DEMADEX) 20 MG tablet    Tachycardia    Relevant Medications    atenolol (TENORMIN) 25 MG tablet    Grade II diastolic dysfunction    Relevant Medications    torsemide (DEMADEX) 20 MG tablet    Palpitations    Relevant Medications    atenolol (TENORMIN) 25 MG tablet       Symptoms and Signs    Peripheral edema    Relevant Medications    torsemide (DEMADEX) 20 MG tablet     PROBLEM LIST:      1. Chest pain   1.1 LHC 3/26/18 - normal coronary arteries; normal right heart pressures,  1.1 stress test 12/22: Negative for ischemia, preserved post-rest ejection fraction 75%  2. Shortness of breath   2.1 echo 11/2/2020-EF greater than 65% grade 2 diastolic dysfunction, mild to moderate biatrial enlargement, mild to moderate MR, mild TR, pulmonary artery systolic pressures in the low 40s  3. Edema  4. Hypertension   5. Hyperlipidemia   6. Palpitations  6.1 event monitor 10/1-10/14/2020-normal sinus rhythm, sinus arrhythmia    HPI  HPI    Ms. Karyna Stewart is a 67-year-old female who presents today for followup of chronic arterial hypertension and dyspnea. She is a prior patient of KALA Arroyo.     The patient has chronic arterial hypertension,  which is being treated with atenolol, amlodipine, losartan, and Dyazide. Her blood pressure is 132/70 mmHg today. She notes a tendency towards hypotension at times.    She takes diuretic therapy of torsemide daily. Her bilateral lower extremity edema is mainly isolated to her ankles, occurs daily, and  worsens throughout the day. She denies associated discomfort.    She takes antianginal therapy of isosorbide as well as antiplatelet therapy of aspirin.     The patient reports dyspnea with exertion and denies dyspnea at rest. She reports orthopnea and sleeps with her head elevated.    She weighed 229 pounds at her last visit in 10/2022 and weighs 222 pounds today. The patient has difficulty eating due to dysphagia. She will undergo esophagus dilation with gastroenterology. She takes Dexilant.    The patient was diagnosed with costochondritis in the past, which she initially feared was a myocardial infarction.    She follows up with Dr. Zari Hamilton, who prescribed the patient Eliquis approximately 1 year ago related to COVID-19 and concern about potential thrombus, which was ruled out by CTA. She denies atrial fibrillation and no longer takes Eliquis.    The patient requests refills of losartan, atenolol, and torsemide. She is scheduled for followup next week with Dr. David Mcdaniel, who prescribes her Dyazide.     Echocardiogram on 12/05/2022 demonstrated ejection fraction above 65 percent and grade 2 diastolic dysfunction. There was normal left ventricular cavity size and left ventricular wall thickness consistent with borderline concentric hypertrophy. Mild to moderate biatrial enlargement was noted. There was mild to moderate mitral valve regurgitation, mild tricuspid regurgitation, and mild pulmonary hypertension with pulmonary artery systolic pressures estimated in the low 40s mmHg.    Pharmacologic stress test demonstrated evidence of ischemia and a post-stress ejection fraction of 75 percent.    On 12/19/2022, her creatinine was 1.04 mg/dL with a GFR of 59 mL/minute.    Laboratory studies performed through her gastroenterologist in [place] on 05/22/2023 demonstrated hemoglobin of 13.1 g/dL. Platelets were elevated at 455,000 though lower than her platelets have been at other blood draws over the past 1  year including 584,000 on 10/18/2022. RDW was elevated at 15.9 percent. Iron profile was normal.    PRIOR MEDS  Current Outpatient Medications on File Prior to Visit   Medication Sig Dispense Refill    albuterol (PROAIR RESPICLICK) 108 (90 Base) MCG/ACT inhaler Inhale 2 puffs Every 4 (Four) Hours As Needed for Wheezing.      amLODIPine (NORVASC) 10 MG tablet Take 1 tablet by mouth once daily 90 tablet 3    aspirin 81 MG chewable tablet Chew 1 tablet Daily. 30 tablet 11    dexlansoprazole (Dexilant) 30 MG capsule Take 1 capsule by mouth Daily.      Ferrous Gluconate (IRON 27 PO) Take  by mouth.      gabapentin (NEURONTIN) 300 MG capsule Take 1 capsule by mouth Daily.      isosorbide mononitrate (IMDUR) 30 MG 24 hr tablet Take 0.5 tablets by mouth Daily. 45 tablet 11    levocetirizine (XYZAL) 5 MG tablet Take 1 tablet by mouth Every Evening.      meloxicam (MOBIC) 15 MG tablet Take 1 tablet by mouth Daily.      montelukast (SINGULAIR) 10 MG tablet Take 1 tablet by mouth Every Night.      nitroglycerin (NITROSTAT) 0.4 MG SL tablet PLACE ONE TABLET UNDER THE TONGUE AS NEEDED FOR ANGINA MAY REPEAT EVERY 5 MINUTES FOR UP TO THREE DOSES 25 tablet 0    potassium chloride (K-DUR,KLOR-CON) 20 MEQ CR tablet Take 1 tablet by mouth once daily 90 tablet 3    predniSONE (DELTASONE) 5 MG tablet Take 1 tablet by mouth Daily.      traMADol (ULTRAM) 50 MG tablet Take 1 tablet by mouth.      triamterene-hydrochlorothiazide (DYAZIDE) 37.5-25 MG per capsule Take 1 capsule by mouth Every Morning.      [DISCONTINUED] atenolol (TENORMIN) 25 MG tablet Take 1 tablet by mouth Daily. 90 tablet 3    [DISCONTINUED] losartan (COZAAR) 50 MG tablet Take 1 tablet by mouth Daily. 90 tablet 3    [DISCONTINUED] torsemide (DEMADEX) 20 MG tablet Take 1 tablet by mouth Daily As Needed (edema). 90 tablet 3    [DISCONTINUED] apixaban (ELIQUIS) 5 MG tablet tablet Take 1 tablet by mouth 2 (Two) Times a Day. 60 tablet 11    [DISCONTINUED] diphenhydrAMINE  (BENADRYL) 50 MG tablet Bring Benadryl 50 mg tablet to the CT SCAN and do not take unless instructed by the Radiologist or Provider 1 tablet 0    [DISCONTINUED] DULoxetine (CYMBALTA) 30 MG capsule Take 1 capsule by mouth Daily.      [DISCONTINUED] Ferrous Fumarate 86 (27 Fe) MG capsule Take  by mouth.      [DISCONTINUED] leflunomide (ARAVA) 10 MG tablet Take 10 mg by mouth Daily.      [DISCONTINUED] Naproxen Sodium (ALEVE PO) Take  by mouth. PRN      [DISCONTINUED] pentoxifylline (TRENtal) 400 MG CR tablet Take 1 tablet by mouth 2 (Two) Times a Day.      [DISCONTINUED] predniSONE (DELTASONE) 50 MG tablet Take 1 tablet by mouth 13 hrs prior to CT SCAN take the second tablet by mouth 7 hrs prior to CT Scan and take 3rd tablet by mouth 1 hr prior to Ct Scan 3 tablet 0     No current facility-administered medications on file prior to visit.       ALLERGIES  Diovan [valsartan] and Iodine    HISTORY  Past Medical History:   Diagnosis Date    Anxiety and depression     Arthritis     Asthma     Chest pain     Chronic cough     Costochondritis     COVID-19 2022    COVID-19 vaccine administered 1st - 2021    2nd - 04/15/2021 - Moderna ( Booster scheduled for 2021 Moderna )     Diabetes     Fibromyalgia     GERD (gastroesophageal reflux disease)     High triglycerides     Hypertension     Irritable bowel syndrome     Lupus (systemic lupus erythematosus)     Migraines     Scleroderma     Seasonal allergies     Ulcerative colitis        Social History     Socioeconomic History    Marital status:     Number of children: 0   Tobacco Use    Smoking status: Former     Types: Cigarettes     Quit date:      Years since quittin.4    Smokeless tobacco: Never    Tobacco comments:     quit 15 yrs ago    Vaping Use    Vaping Use: Never used   Substance and Sexual Activity    Alcohol use: No    Drug use: No    Sexual activity: Defer       Family History   Problem Relation Age of Onset    COPD Mother      "Hypertension Mother     Diverticulitis Father 45    No Known Problems Son     No Known Problems Daughter     Stomach cancer Paternal Grandmother        Review of Systems   Constitutional: Positive for fatigue. Negative for chills, diaphoresis and fever.   Eyes: Negative.    Respiratory: Positive for shortness of breath (on exertion) and wheezing. Negative for apnea, cough and chest tightness.    Cardiovascular: Positive for leg swelling (ankles). Negative for chest pain (Costochondritis ) and palpitations.   Gastrointestinal: Positive for abdominal pain and diarrhea. Negative for blood in stool, constipation, nausea and vomiting.        Gerd   Endocrine: Negative.    Genitourinary: Negative.  Negative for hematuria.   Musculoskeletal: Positive for arthralgias, back pain, myalgias and neck pain.   Skin: Negative.    Neurological: Positive for dizziness (occas), weakness (legs), numbness (R vthigh to hip due to arthritis) and headaches (migraines). Negative for syncope and light-headedness.   Hematological: Negative.  Does not bruise/bleed easily.   Psychiatric/Behavioral: Negative.  Negative for sleep disturbance (insomnia).       Objective     VITALS: /70 (BP Location: Left arm, Patient Position: Sitting)   Pulse 60   Ht 152 cm (59.84\")   Wt 101 kg (222 lb)   SpO2 95%   BMI 43.58 kg/m²     LABS:   Lab Results (most recent)       None            IMAGING:   XR Abdomen 1 View    Result Date: 5/22/2023  Bowel gas pattern within normal limits CRITICAL RESULT:   No. COMMUNICATION: Per this written report. Drafted by Kareem Pal MD on 5/22/2023 12:30 PM Final report signed by Kareem Pal MD on 5/22/2023 12:31 PM    [place] Upper GI    Result Date: 2/20/2023  Normal esophagus with no strictures, filling defects or masses. Mild esophageal dysmotility and gastroesophageal reflux. CRITICAL RESULT: No. COMMUNICATION: Per this written report. Dictated by Brunilda Chung MD on 2/20/2023 3:58 PM Signed by Brunilda SINGH" MD Sheldon on 2/20/2023 3:59 PM      EXAM:  Physical Exam  Vitals and nursing note reviewed.   Constitutional:       Appearance: She is well-developed.   Eyes:      Pupils: Pupils are equal, round, and reactive to light.   Neck:      Thyroid: No thyroid mass.      Vascular: No carotid bruit or JVD.      Trachea: Trachea and phonation normal.   Cardiovascular:      Rate and Rhythm: Normal rate and regular rhythm.      Pulses:           Radial pulses are 2+ on the right side and 2+ on the left side.        Posterior tibial pulses are 2+ on the right side and 2+ on the left side.      Heart sounds: Normal heart sounds. No murmur heard.    No friction rub. No gallop.   Pulmonary:      Effort: Pulmonary effort is normal. No respiratory distress.      Breath sounds: Normal breath sounds. No wheezing or rales.   Abdominal:      Palpations: Abdomen is soft.   Musculoskeletal:         General: Normal range of motion.      Cervical back: Neck supple.   Skin:     General: Skin is warm and dry.      Capillary Refill: Capillary refill takes less than 2 seconds.      Findings: No rash.   Neurological:      Mental Status: She is alert and oriented to person, place, and time.      Gait: Gait abnormal (Uses a cane for ambulation).   Psychiatric:         Speech: Speech normal.         Behavior: Behavior normal.         Thought Content: Thought content normal.         Judgment: Judgment normal.        Procedure   Procedures       Assessment & Plan    Diagnosis Plan   1. Essential hypertension  losartan (COZAAR) 50 MG tablet    atenolol (TENORMIN) 25 MG tablet      2. Palpitations  atenolol (TENORMIN) 25 MG tablet      3. Tachycardia  atenolol (TENORMIN) 25 MG tablet      4. Grade II diastolic dysfunction  torsemide (DEMADEX) 20 MG tablet      5. Peripheral edema  torsemide (DEMADEX) 20 MG tablet          Return in about 6 months (around 12/2/2023), or if symptoms worsen or fail to improve.    Diagnoses and all orders for this  visit:    1. Essential hypertension  -     losartan (COZAAR) 50 MG tablet; Take 1 tablet by mouth Daily.  Dispense: 90 tablet; Refill: 3  -     atenolol (TENORMIN) 25 MG tablet; Take 1 tablet by mouth Daily.  Dispense: 90 tablet; Refill: 3    2. Palpitations  -     atenolol (TENORMIN) 25 MG tablet; Take 1 tablet by mouth Daily.  Dispense: 90 tablet; Refill: 3    3. Tachycardia  -     atenolol (TENORMIN) 25 MG tablet; Take 1 tablet by mouth Daily.  Dispense: 90 tablet; Refill: 3    4. Grade II diastolic dysfunction  -     torsemide (DEMADEX) 20 MG tablet; Take 1 tablet by mouth Daily.  Dispense: 90 tablet; Refill: 3    5. Peripheral edema  -     torsemide (DEMADEX) 20 MG tablet; Take 1 tablet by mouth Daily.  Dispense: 90 tablet; Refill: 3    Plan  1. The patient was informed that she may take an additional dose of torsemide on days when she experiences increased swelling or orthopnea, particularly if she gains 3 pounds or more overnight or 5 to 6 pounds in 1 week.  2. Losartan, atenolol, and torsemide were refilled.  3. She will request that Dr. Mcdaniel forward her laboratory study results to this office.  She is scheduled to follow-up with him in the near future and has planned routine blood work.  4.  Patient's blood pressure is controlled on current blood pressure medication regimen.  No medication changes are warranted at this time.  Patient advised to monitor blood pressure on a daily basis and report any persistent highs or lows.  Set goal blood pressure for patient at 130/80 or below.  5.  Informed of signs and symptoms of ACS and advised to seek emergent treatment for any new worsening symptoms.  Patient also advised sooner follow-up as needed.  Also advised to follow-up with family doctor as needed  This note is dictated utilizing voice recognition software.  Although this record has been proof read, transcriptional errors may still be present. If questions occur regarding the content of this record please do  not hesitate to call our office.  I have reviewed and confirmed the accuracy of the ROS as documented by the MA/LPN/RN KALA Dias    Assessment  1. Chronic arterial hypertension  2. Dyspnea on exertion  3. Bilateral lower extremity swelling  4. Grade 2 diastolic dysfunction    Karyna Stewart  reports that she quit smoking about 21 years ago. Her smoking use included cigarettes. She has never used smokeless tobacco..      Advance Care Planning   ACP discussion was held with the patient during this visit. Patient does not have an advance directive, declines further assistance.       MEDS ORDERED DURING VISIT:  New Medications Ordered This Visit   Medications    losartan (COZAAR) 50 MG tablet     Sig: Take 1 tablet by mouth Daily.     Dispense:  90 tablet     Refill:  3    atenolol (TENORMIN) 25 MG tablet     Sig: Take 1 tablet by mouth Daily.     Dispense:  90 tablet     Refill:  3    torsemide (DEMADEX) 20 MG tablet     Sig: Take 1 tablet by mouth Daily.     Dispense:  90 tablet     Refill:  3           This document has been electronically signed by KALA Dias Jr.  June 2, 2023 12:12 EDT    Transcribed from ambient dictation for KALA Dias by Amalia Longo.  06/02/23   13:34 EDT    Patient or patient representative verbalized consent to the visit recording.  I have personally performed the services described in this document as transcribed by the above individual, and it is both accurate and complete.

## 2023-12-07 ENCOUNTER — OFFICE VISIT (OUTPATIENT)
Dept: CARDIOLOGY | Facility: CLINIC | Age: 68
End: 2023-12-07
Payer: MEDICARE

## 2023-12-07 VITALS
SYSTOLIC BLOOD PRESSURE: 118 MMHG | OXYGEN SATURATION: 93 % | DIASTOLIC BLOOD PRESSURE: 59 MMHG | WEIGHT: 231.8 LBS | HEART RATE: 59 BPM | BODY MASS INDEX: 45.51 KG/M2 | HEIGHT: 60 IN

## 2023-12-07 DIAGNOSIS — I10 ESSENTIAL HYPERTENSION: Primary | ICD-10-CM

## 2023-12-07 DIAGNOSIS — Z86.39 HISTORY OF LOW POTASSIUM: ICD-10-CM

## 2023-12-07 DIAGNOSIS — R06.02 SHORTNESS OF BREATH: ICD-10-CM

## 2023-12-07 DIAGNOSIS — R60.9 PERIPHERAL EDEMA: ICD-10-CM

## 2023-12-07 DIAGNOSIS — E78.2 MIXED HYPERLIPIDEMIA: ICD-10-CM

## 2023-12-07 DIAGNOSIS — I51.89 GRADE II DIASTOLIC DYSFUNCTION: ICD-10-CM

## 2023-12-07 RX ORDER — ERGOCALCIFEROL 1.25 MG/1
50000 CAPSULE ORAL WEEKLY
COMMUNITY

## 2023-12-07 RX ORDER — POTASSIUM CHLORIDE 20 MEQ/1
20 TABLET, EXTENDED RELEASE ORAL DAILY
Qty: 90 TABLET | Refills: 3 | Status: SHIPPED | OUTPATIENT
Start: 2023-12-07

## 2023-12-07 RX ORDER — FLUTICASONE FUROATE AND VILANTEROL 100; 25 UG/1; UG/1
1 POWDER RESPIRATORY (INHALATION)
COMMUNITY

## 2023-12-07 RX ORDER — BUMETANIDE 2 MG/1
2 TABLET ORAL DAILY
Qty: 90 TABLET | Refills: 1 | Status: SHIPPED | OUTPATIENT
Start: 2023-12-07

## 2023-12-07 RX ORDER — AMLODIPINE BESYLATE 10 MG/1
10 TABLET ORAL DAILY
Qty: 90 TABLET | Refills: 3 | Status: SHIPPED | OUTPATIENT
Start: 2023-12-07

## 2023-12-07 RX ORDER — LEFLUNOMIDE 20 MG/1
20 TABLET ORAL DAILY
COMMUNITY

## 2023-12-07 RX ORDER — ASPIRIN 81 MG/1
81 TABLET ORAL DAILY
COMMUNITY

## 2023-12-07 NOTE — PROGRESS NOTES
Subjective     Karyna Stewart is a 68 y.o. female who presents to day for 6 month follow up  and Hypertension.    CHIEF COMPLIANT  Chief Complaint   Patient presents with    6 month follow up     Hypertension       Active Problems:  Problem List Items Addressed This Visit          Cardiac and Vasculature    Essential hypertension - Primary    Mixed hyperlipidemia    Grade II diastolic dysfunction       Pulmonary and Pneumonias    Shortness of breath       Symptoms and Signs    Peripheral edema   PROBLEM LIST:      1. Chest pain   1.1 C 3/26/18 - normal coronary arteries; normal right heart pressures,  1.1 stress test 12/22: Negative for ischemia, preserved post-rest ejection fraction 75%  2. Shortness of breath   2.1 echo 12/2022: EF greater than 65%, grade 2 diastolic dysfunction mild to moderate MR mild tricuspid regurgitation, pulmonary artery systolic pressures in the low 40  3. Edema  4. Hypertension   5. Hyperlipidemia   6. Palpitations  6.1 event monitor 10/1-10/14/2020-normal sinus rhythm, sinus arrhythmia    HPI  HPI  Ms. Karyna Lewis is a 68-year-old female patient who is being seen today for follow-up due to chronic arterial hypertension.  Patient is being treated losartan, isosorbide, atenolol, amlodipine, and Dyazide.  Patient did go under echocardiogram in December 2022 that showed a preserved ejection fraction greater than 65% grade 2 diastolic dysfunction mild to moderate MR and mild tricuspid regurgitation.  Patient also had mild pulmonary hypertension with pulmonary artery systolic pressures in the low 40s.    Patient also has chronic arterial hypertension when she is on losartan, isosorbide, atenolol, and amlodipine and Dyazide.  Today her blood pressure is 118/59 heart rate of 59.    She does have mild pulmonary hypertension per previous echocardiogram with a pulmonary artery systolic pressure in the low 40s.  She does report shortness of breath that occurs intermittently if she does  a little more than usual activity.  She does have some associated wheezes with that that relieved with her inhaler.  She denies any dyspnea at rest.  She does report some potential orthopnea where she has to sleep at a 45 degree angle due to back pain reflux and breathing.    Patient does report some lower extremity edema that she continues to have despite therapy of torsemide and Dyazide.  She says mainly around the ankles and occurs on a daily basis and he will even be swollen when she wakes up in the morning.    Patient also reports dizziness that occurs with position changes and at random at times.  PRIOR MEDS  Current Outpatient Medications on File Prior to Visit   Medication Sig Dispense Refill    amLODIPine (NORVASC) 10 MG tablet Take 1 tablet by mouth once daily 90 tablet 3    aspirin 81 MG EC tablet Take 1 tablet by mouth Daily.      atenolol (TENORMIN) 25 MG tablet Take 1 tablet by mouth Daily. 90 tablet 3    dexlansoprazole (Dexilant) 30 MG capsule Take 1 capsule by mouth Daily.      Fluticasone Furoate-Vilanterol (Breo Ellipta) 100-25 MCG/ACT aerosol powder  Inhale 1 puff Daily.      Fluticasone Propionate, Inhal, 250 MCG/ACT aerosol powder  Inhale.      isosorbide mononitrate (IMDUR) 30 MG 24 hr tablet Take 0.5 tablets by mouth Daily. 45 tablet 3    leflunomide (ARAVA) 20 MG tablet Take 1 tablet by mouth Daily.      levocetirizine (XYZAL) 5 MG tablet Take 1 tablet by mouth Every Evening.      losartan (COZAAR) 50 MG tablet Take 1 tablet by mouth Daily. 90 tablet 3    meloxicam (MOBIC) 15 MG tablet Take 1 tablet by mouth Daily.      montelukast (SINGULAIR) 10 MG tablet Take 1 tablet by mouth Every Night.      nitroglycerin (NITROSTAT) 0.4 MG SL tablet PLACE ONE TABLET UNDER THE TONGUE AS NEEDED FOR ANGINA MAY REPEAT EVERY 5 MINUTES FOR UP TO THREE DOSES 25 tablet 0    potassium chloride (K-DUR,KLOR-CON) 20 MEQ CR tablet Take 1 tablet by mouth once daily 90 tablet 3    predniSONE (DELTASONE) 5 MG tablet  Take 1 tablet by mouth Daily.      torsemide (DEMADEX) 20 MG tablet Take 1 tablet by mouth Daily. 90 tablet 3    traMADol (ULTRAM) 50 MG tablet Take 1 tablet by mouth 3 (Three) Times a Day.      triamterene-hydrochlorothiazide (DYAZIDE) 37.5-25 MG per capsule Take 1 capsule by mouth Every Morning.      vitamin D (ERGOCALCIFEROL) 1.25 MG (31068 UT) capsule capsule Take 1 capsule by mouth 1 (One) Time Per Week.      [DISCONTINUED] albuterol (PROAIR RESPICLICK) 108 (90 Base) MCG/ACT inhaler Inhale 2 puffs Every 4 (Four) Hours As Needed for Wheezing.      [DISCONTINUED] aspirin 81 MG chewable tablet Chew 1 tablet Daily. 30 tablet 11    [DISCONTINUED] Ferrous Gluconate (IRON 27 PO) Take  by mouth.      [DISCONTINUED] gabapentin (NEURONTIN) 300 MG capsule Take 1 capsule by mouth Daily.       No current facility-administered medications on file prior to visit.       ALLERGIES  Diovan [valsartan] and Iodine    HISTORY  Past Medical History:   Diagnosis Date    Anxiety and depression     Arthritis     Asthma     Chest pain     Chronic cough     Costochondritis     COVID-19 2022    COVID-19 vaccine administered 1st - 2021    2nd - 04/15/2021 - Moderna ( Booster scheduled for 2021 Moderna )     Diabetes     Fibromyalgia     GERD (gastroesophageal reflux disease)     High triglycerides     Hypertension     Irritable bowel syndrome     Lupus (systemic lupus erythematosus)     Migraines     Osteoporosis     Scleroderma     Seasonal allergies     Ulcerative colitis        Social History     Socioeconomic History    Marital status:     Number of children: 0   Tobacco Use    Smoking status: Former     Types: Cigarettes     Quit date:      Years since quittin.9    Smokeless tobacco: Never    Tobacco comments:     quit 15 yrs ago    Vaping Use    Vaping Use: Never used   Substance and Sexual Activity    Alcohol use: No    Drug use: No    Sexual activity: Defer       Family History   Problem Relation  "Age of Onset    COPD Mother     Hypertension Mother     Diverticulitis Father 45    No Known Problems Son     No Known Problems Daughter     Stomach cancer Paternal Grandmother        Review of Systems   Constitutional:  Negative for chills, fatigue and fever.   HENT:  Negative for congestion, rhinorrhea and sore throat.    Eyes:  Negative for visual disturbance.   Respiratory:  Positive for apnea (Can not tolerate) and shortness of breath (some not consistant). Negative for chest tightness.    Cardiovascular:  Positive for leg swelling (ankles up). Negative for chest pain and palpitations.   Gastrointestinal:  Positive for constipation and diarrhea. Negative for nausea.   Musculoskeletal:  Positive for arthralgias, back pain and neck pain.   Allergic/Immunologic: Positive for environmental allergies. Negative for food allergies.   Neurological:  Positive for dizziness (random) and light-headedness. Negative for syncope and weakness.   Hematological:  Does not bruise/bleed easily.   Psychiatric/Behavioral:  Positive for sleep disturbance (No SOB does not sleep well).        Objective     VITALS: /59 (BP Location: Left arm, Patient Position: Sitting, Cuff Size: Adult)   Pulse 59   Ht 152 cm (59.84\")   Wt 105 kg (231 lb 12.8 oz)   SpO2 93%   BMI 45.51 kg/m²     LABS:   Lab Results (most recent)       None            IMAGING:   No Images in the past 120 days found..    EXAM:  Physical Exam  Vitals and nursing note reviewed.   Constitutional:       Appearance: She is well-developed.   HENT:      Head: Normocephalic.   Neck:      Thyroid: No thyroid mass.      Vascular: No carotid bruit or JVD.      Trachea: Trachea and phonation normal.   Cardiovascular:      Rate and Rhythm: Normal rate and regular rhythm.      Pulses:           Radial pulses are 2+ on the right side and 2+ on the left side.        Posterior tibial pulses are 2+ on the right side and 2+ on the left side.      Heart sounds: Normal heart " sounds. No murmur heard.     No friction rub. No gallop.   Pulmonary:      Effort: Pulmonary effort is normal. No respiratory distress.      Breath sounds: Normal breath sounds. No wheezing or rales.   Musculoskeletal:         General: Swelling (Trace) present. Normal range of motion.      Cervical back: Neck supple.   Skin:     General: Skin is warm and dry.      Capillary Refill: Capillary refill takes less than 2 seconds.      Findings: No rash.   Neurological:      Mental Status: She is alert and oriented to person, place, and time.      Gait: Gait abnormal (Walking cane).   Psychiatric:         Speech: Speech normal.         Behavior: Behavior normal.         Thought Content: Thought content normal.         Judgment: Judgment normal.         Procedure   Procedures       Assessment & Plan    Diagnosis Plan   1. Essential hypertension        2. Mixed hyperlipidemia        3. Grade II diastolic dysfunction        4. Peripheral edema        5. Shortness of breath        1.  Patient's blood pressure is controlled on current blood pressure medication regimen.  No medication changes are warranted at this time.  Patient advised to monitor blood pressure on a daily basis and report any persistent highs or lows.  Set goal blood pressure for patient at 130/80 or below.  2.  Patient does have lower extremity edema grade 2 diastolic dysfunction in which she is on Dyazide and torsemide.  Due to ineffectiveness of the torsemide I would like to switch her to Bumex to see if we can better control her edema.  3.  Patient's shortness of breath is stable and nonprogressive.  Will continue to enter.  4.  We did discuss patient's kidney function and potassium level.  She is scheduled to see Dr. Mcdaniel.  I am going to order labs just in case that she does not have labs ordered on her follow-up otherwise she will proceed with the labs Dr. Mcdaniel orders.  My biggest concern is her kidney function due to the diuretics as well as her  potassium.  5.  Informed of signs and symptoms of ACS and advised to seek emergent treatment for any new worsening symptoms.  Patient also advised sooner follow-up as needed.  Also advised to follow-up with family doctor as needed  This note is dictated utilizing voice recognition software.  Although this record has been proof read, transcriptional errors may still be present. If questions occur regarding the content of this record please do not hesitate to call our office.  I have reviewed and confirmed the accuracy of the ROS as documented by the MA/LPN/RN KALA Dias  No follow-ups on file.    Diagnoses and all orders for this visit:    1. Essential hypertension (Primary)    2. Mixed hyperlipidemia    3. Grade II diastolic dysfunction    4. Peripheral edema    5. Shortness of breath        Karyna Stewart  reports that she quit smoking about 21 years ago. Her smoking use included cigarettes. She has never used smokeless tobacco.. I have educated her on the risk of diseases from using tobacco products.    Advance Care Planning   ACP discussion was held with the patient during this visit. Patient does not have an advance directive, declines further assistance.           MEDS ORDERED DURING VISIT:  No orders of the defined types were placed in this encounter.          This document has been electronically signed by KALA Dias Jr.  December 7, 2023 10:25 EST

## 2023-12-22 ENCOUNTER — TELEPHONE (OUTPATIENT)
Dept: CARDIOLOGY | Facility: CLINIC | Age: 68
End: 2023-12-22
Payer: MEDICARE

## 2023-12-22 RX ORDER — ROSUVASTATIN CALCIUM 20 MG/1
20 TABLET, COATED ORAL DAILY
Qty: 30 TABLET | Refills: 11 | Status: SHIPPED | OUTPATIENT
Start: 2023-12-22

## 2023-12-22 NOTE — TELEPHONE ENCOUNTER
----- Message from KALA Dias sent at 12/21/2023 10:14 AM EST -----  Triglycerides 113, HDL 64, .  Patient does have an LDL of 167.  Would recommend decreasing foods coming from animal products meat cheeses and eggs.  Choose grilled and baked over fried or deep-fried.  I also would recommend statin therapy of r  osuvastatin 20 mg daily.    Patient did have a moderate amount of blood in her urine.  Negative for nitrates small amount of leukocytes.  Please make sure she follows up with her PCP.

## 2023-12-22 NOTE — TELEPHONE ENCOUNTER
I called patient and let her know about lab results as follows :    Triglycerides 113, HDL 64, .  Patient does have an LDL of 167.  Would recommend decreasing foods coming from animal products meat cheeses and eggs.  Choose grilled and baked over fried or deep-fried.  I also would recommend statin therapy of rosuvastatin 20 mg daily.     Patient did have a moderate amount of blood in her urine.  Negative for nitrates small amount of leukocytes.  Please make sure she follows up with her PCP.     She sees urology and will call and see id she can get in with them to address blood in urine. She also would farheen to try Rosuvastatin .

## 2024-06-06 ENCOUNTER — TELEPHONE (OUTPATIENT)
Dept: CARDIOLOGY | Facility: CLINIC | Age: 69
End: 2024-06-06

## 2024-06-06 NOTE — TELEPHONE ENCOUNTER
Caller: Karyna Stewart    Relationship: Self    Best call back number: [phone]     What is the best time to reach you: ANYTIME     What was the call regarding: PT HAS HAD RECENT LAB WORK WIT HER rheumatologist MAYCOL BLANCHARD     NO HOS STAYS OR ER VISITS     Is it okay if the provider responds through MyChart: CALL

## 2024-06-07 ENCOUNTER — OFFICE VISIT (OUTPATIENT)
Dept: CARDIOLOGY | Facility: CLINIC | Age: 69
End: 2024-06-07
Payer: MEDICARE

## 2024-06-07 VITALS
DIASTOLIC BLOOD PRESSURE: 59 MMHG | SYSTOLIC BLOOD PRESSURE: 108 MMHG | OXYGEN SATURATION: 97 % | BODY MASS INDEX: 45.16 KG/M2 | WEIGHT: 230 LBS | HEIGHT: 60 IN | HEART RATE: 66 BPM

## 2024-06-07 DIAGNOSIS — R94.39 ABNORMAL STRESS TEST: ICD-10-CM

## 2024-06-07 DIAGNOSIS — E78.2 MIXED HYPERLIPIDEMIA: ICD-10-CM

## 2024-06-07 DIAGNOSIS — R60.0 PERIPHERAL EDEMA: ICD-10-CM

## 2024-06-07 DIAGNOSIS — R00.0 TACHYCARDIA: ICD-10-CM

## 2024-06-07 DIAGNOSIS — Z86.39 HISTORY OF LOW POTASSIUM: ICD-10-CM

## 2024-06-07 DIAGNOSIS — I51.89 GRADE II DIASTOLIC DYSFUNCTION: ICD-10-CM

## 2024-06-07 DIAGNOSIS — R07.2 PRECORDIAL PAIN: ICD-10-CM

## 2024-06-07 DIAGNOSIS — I10 ESSENTIAL HYPERTENSION: Primary | ICD-10-CM

## 2024-06-07 DIAGNOSIS — R00.2 PALPITATIONS: ICD-10-CM

## 2024-06-07 RX ORDER — ISOSORBIDE MONONITRATE 30 MG/1
15 TABLET, EXTENDED RELEASE ORAL DAILY
Qty: 45 TABLET | Refills: 3 | Status: SHIPPED | OUTPATIENT
Start: 2024-06-07

## 2024-06-07 RX ORDER — ALBUTEROL SULFATE 90 UG/1
AEROSOL, METERED RESPIRATORY (INHALATION)
COMMUNITY

## 2024-06-07 RX ORDER — EPINEPHRINE 0.3 MG/.3ML
INJECTION SUBCUTANEOUS
COMMUNITY

## 2024-06-07 RX ORDER — BUMETANIDE 2 MG/1
2 TABLET ORAL DAILY
Qty: 90 TABLET | Refills: 1 | Status: SHIPPED | OUTPATIENT
Start: 2024-06-07

## 2024-06-07 RX ORDER — POTASSIUM CHLORIDE 20 MEQ/1
20 TABLET, EXTENDED RELEASE ORAL DAILY
Qty: 90 TABLET | Refills: 3 | Status: SHIPPED | OUTPATIENT
Start: 2024-06-07

## 2024-06-07 RX ORDER — NITROGLYCERIN 0.4 MG/1
TABLET SUBLINGUAL
Qty: 25 TABLET | Refills: 3 | Status: SHIPPED | OUTPATIENT
Start: 2024-06-07

## 2024-06-07 RX ORDER — LOSARTAN POTASSIUM 50 MG/1
50 TABLET ORAL DAILY
Qty: 90 TABLET | Refills: 3 | Status: SHIPPED | OUTPATIENT
Start: 2024-06-07

## 2024-06-07 RX ORDER — ATENOLOL 25 MG/1
25 TABLET ORAL DAILY
Qty: 90 TABLET | Refills: 3 | Status: SHIPPED | OUTPATIENT
Start: 2024-06-07

## 2024-06-07 NOTE — PROGRESS NOTES
Subjective     Karyna Stewart is a 68 y.o. female who presents to day for Hypertension (Six month follow up - Patient denies any chest pains, palpitations, or shortness of breath. ).    CHIEF COMPLIANT  Chief Complaint   Patient presents with    Hypertension     Six month follow up - Patient denies any chest pains, palpitations, or shortness of breath.        Active Problems:  Problem List Items Addressed This Visit          Cardiac and Vasculature    Chest pain    Relevant Medications    isosorbide mononitrate (IMDUR) 30 MG 24 hr tablet    Other Relevant Orders    ECG 12 Lead    Hepatic Function Panel    Lipid Panel    Essential hypertension - Primary    Relevant Medications    EPINEPHrine (EPIPEN) 0.3 MG/0.3ML solution auto-injector injection    atenolol (TENORMIN) 25 MG tablet    bumetanide (BUMEX) 2 MG tablet    losartan (COZAAR) 50 MG tablet    Other Relevant Orders    ECG 12 Lead    Hepatic Function Panel    Lipid Panel    Mixed hyperlipidemia    Relevant Orders    ECG 12 Lead    Hepatic Function Panel    Lipid Panel    Tachycardia    Relevant Medications    atenolol (TENORMIN) 25 MG tablet    Grade II diastolic dysfunction    Relevant Orders    ECG 12 Lead    Hepatic Function Panel    Lipid Panel    Palpitations    Relevant Medications    atenolol (TENORMIN) 25 MG tablet       Symptoms and Signs    Peripheral edema    Relevant Orders    ECG 12 Lead    Hepatic Function Panel    Lipid Panel     Other Visit Diagnoses       History of low potassium        Relevant Medications    potassium chloride (KLOR-CON M20) 20 MEQ CR tablet    Abnormal stress test        Relevant Medications    nitroglycerin (NITROSTAT) 0.4 MG SL tablet        PROBLEM LIST:      1. Chest pain   1.1 [facility] 3/26/18 - normal coronary arteries; normal right heart pressures,  1.1 stress test 12/22: Negative for ischemia, preserved post-rest ejection fraction 75%  2. Shortness of breath   2.1 echo 12/2022: EF greater than 65%, grade 2 diastolic  dysfunction mild to moderate MR mild tricuspid regurgitation, pulmonary artery systolic pressures in the low 40  3. Edema  4. Hypertension   5. Hyperlipidemia   6. Palpitations  6.1 event monitor 10/1-10/14/2020-normal sinus rhythm, sinus arrhythmia    HPI  HPI  Ms. Karyna Lewis is a 68-year-old female patient who is being followed up today for chronic arterial hypertension and hyperlipidemia.    Patient does have a history of chronic arterial hypertension which she is being treated with losartan, isosorbide, atenolol, amlodipine, and Dyazide. Today her blood pressure is 108/59 HR 66.  She does have some dizziness that may be associated with the treatment of her blood pressure which she reports has improved.    She also has hyperlipidemia her most recent lipid panel which was done in December 2023 identified an LDL of 167, triglycerides 113, HDL 64.She was started on rosuvastatin 20 mg daily.  Will have her recheck her cholesterol before next visit.  Her latest set up lab work does not look like she had a lipid panel.    Patient does report chest tightness which she thinks may be associated with anxiety.  She says also could be associated with her lupus scleroderma or RA.  She has been told she has costochondritis.  She did have a negative stress test in December 2022.  She says the chest pain has not changed since.  Still the same characteristic may happen once a month.      Patient does have some lower extremity edema which she is taking Bumex.  She says it well-controlled was her swelling.  She says she does have swelling within her left ankle and her sock will get tight at times.    Patient's major complaint is the issue she is having with her mouth is being treated by the [facility].  She says nothing seems to help.  She is taking a new medication at this time and she has a follow-up in 2 to 3 weeks.  She says this is been going on for approximately 3 years ever since she had osteomyelitis in  her jaw.  PRIOR MEDS  Current Outpatient Medications on File Prior to Visit   Medication Sig Dispense Refill    albuterol sulfate  (90 Base) MCG/ACT inhaler INHALE 2 PUFFS BY MOUTH EVERY 4 TO 6 HOURS AS NEEDED FOR COUGH, WHEEZING, OR SHORTNESS OF BREATH. USE WITH VORTEX SPACER.      amLODIPine (NORVASC) 10 MG tablet Take 1 tablet by mouth Daily. 90 tablet 3    aspirin 81 MG EC tablet Take 1 tablet by mouth Daily.      Calcium Carb-Cholecalciferol (CALCIUM 500 + D PO) Take  by mouth.      dexlansoprazole (Dexilant) 30 MG capsule Take 1 capsule by mouth Daily.      Fluticasone Propionate, Inhal, 250 MCG/ACT aerosol powder  Inhale.      leflunomide (ARAVA) 20 MG tablet Take 1 tablet by mouth Daily.      levocetirizine (XYZAL) 5 MG tablet Take 1 tablet by mouth Every Evening.      meloxicam (MOBIC) 15 MG tablet Take 1 tablet by mouth Daily.      montelukast (SINGULAIR) 10 MG tablet Take 1 tablet by mouth Every Night.      NON FORMULARY Take  by mouth. Neurogel in orabase: Benzocaine 16.7% carba 4.48% AMITR 1.79% cream -- Apply to affected area 4-5 times daily      predniSONE (DELTASONE) 5 MG tablet Take 1 tablet by mouth Daily.      rosuvastatin (CRESTOR) 20 MG tablet Take 1 tablet by mouth Daily. 30 tablet 11    traMADol (ULTRAM) 50 MG tablet Take 1 tablet by mouth 3 (Three) Times a Day.      triamterene-hydrochlorothiazide (DYAZIDE) 37.5-25 MG per capsule Take 1 capsule by mouth Every Morning.      vitamin D (ERGOCALCIFEROL) 1.25 MG (24002 UT) capsule capsule Take 1 capsule by mouth 1 (One) Time Per Week.      [DISCONTINUED] atenolol (TENORMIN) 25 MG tablet Take 1 tablet by mouth Daily. 90 tablet 3    [DISCONTINUED] bumetanide (BUMEX) 2 MG tablet Take 1 tablet by mouth Daily. 90 tablet 1    [DISCONTINUED] isosorbide mononitrate (IMDUR) 30 MG 24 hr tablet Take 0.5 tablets by mouth Daily. 45 tablet 3    [DISCONTINUED] losartan (COZAAR) 50 MG tablet Take 1 tablet by mouth Daily. 90 tablet 3    [DISCONTINUED]  potassium chloride (K-DUR,KLOR-CON) 20 MEQ CR tablet Take 1 tablet by mouth Daily. 90 tablet 3    EPINEPHrine (EPIPEN) 0.3 MG/0.3ML solution auto-injector injection USE AS DIRECTED FOR ACUTE ALLERGIC REATION. (Patient not taking: Reported on 2024)      [DISCONTINUED] Fluticasone Furoate-Vilanterol (Breo Ellipta) 100-25 MCG/ACT aerosol powder  Inhale 1 puff Daily.      [DISCONTINUED] nitroglycerin (NITROSTAT) 0.4 MG SL tablet PLACE ONE TABLET UNDER THE TONGUE AS NEEDED FOR ANGINA MAY REPEAT EVERY 5 MINUTES FOR UP TO THREE DOSES (Patient not taking: Reported on 2024) 25 tablet 0     No current facility-administered medications on file prior to visit.       ALLERGIES  Diovan [valsartan] and Iodine    HISTORY  Past Medical History:   Diagnosis Date    Anxiety and depression     Arthritis     Asthma     Chest pain     Chronic cough     Costochondritis     COVID-19 2022    COVID-19 vaccine administered 1st - 2021    2nd - 04/15/2021 - Moderna ( Booster scheduled for 2021 Moderna )     Diabetes     Fibromyalgia     GERD (gastroesophageal reflux disease)     High triglycerides     Hypertension     Irritable bowel syndrome     Lupus (systemic lupus erythematosus)     Migraines     Osteoporosis     Scleroderma     Seasonal allergies     Ulcerative colitis        Social History     Socioeconomic History    Marital status:     Number of children: 0   Tobacco Use    Smoking status: Former     Current packs/day: 0.00     Types: Cigarettes     Quit date:      Years since quittin.4    Smokeless tobacco: Never    Tobacco comments:     quit 15 yrs ago    Vaping Use    Vaping status: Never Used   Substance and Sexual Activity    Alcohol use: No    Drug use: No    Sexual activity: Defer       Family History   Problem Relation Age of Onset    COPD Mother     Hypertension Mother     Diverticulitis Father 45    No Known Problems Son     No Known Problems Daughter     Stomach cancer Paternal  "Grandmother        Review of Systems   HENT:  Positive for facial swelling, mouth sores and trouble swallowing. Negative for congestion, ear pain, sinus pressure and sinus pain.    Eyes: Negative.    Respiratory:  Positive for chest tightness. Negative for cough, choking and shortness of breath.    Cardiovascular:  Positive for leg swelling. Negative for chest pain and palpitations.   Gastrointestinal:  Positive for constipation, diarrhea and nausea. Negative for abdominal pain, blood in stool and vomiting.   Endocrine: Negative.    Genitourinary: Negative.    Musculoskeletal: Negative.    Skin: Negative.    Allergic/Immunologic: Positive for environmental allergies and immunocompromised state. Negative for food allergies.   Neurological:  Positive for dizziness, weakness, light-headedness and headaches. Negative for syncope and numbness.   Hematological: Negative.    Psychiatric/Behavioral: Negative.         Objective     VITALS: /59 (BP Location: Left arm, Patient Position: Sitting, Cuff Size: Large Adult)   Pulse 66   Ht 152 cm (59.84\")   Wt 104 kg (230 lb)   SpO2 97%   BMI 45.16 kg/m²     LABS:   Lab Results (most recent)       None            IMAGING:   No Images in the past 120 days found..    EXAM:  Physical Exam  Vitals and nursing note reviewed.   Constitutional:       Appearance: She is well-developed.   HENT:      Head: Normocephalic.   Neck:      Thyroid: No thyroid mass.      Vascular: No carotid bruit or JVD.      Trachea: Trachea and phonation normal.   Cardiovascular:      Rate and Rhythm: Normal rate and regular rhythm.      Pulses:           Radial pulses are 2+ on the right side and 2+ on the left side.        Posterior tibial pulses are 2+ on the right side and 2+ on the left side.      Heart sounds: Normal heart sounds. No murmur heard.     No friction rub. No gallop.   Pulmonary:      Effort: Pulmonary effort is normal. No respiratory distress.      Breath sounds: Normal breath " sounds. No wheezing or rales.   Musculoskeletal:         General: No swelling. Normal range of motion.      Cervical back: Neck supple.   Skin:     General: Skin is warm and dry.      Capillary Refill: Capillary refill takes less than 2 seconds.      Findings: No rash.   Neurological:      Mental Status: She is alert and oriented to person, place, and time.      Gait: Gait abnormal (Uses a cane).   Psychiatric:         Speech: Speech normal.         Behavior: Behavior normal.         Thought Content: Thought content normal.         Judgment: Judgment normal.         Procedure     ECG 12 Lead    Date/Time: 6/7/2024 10:35 AM  Performed by: Jules Ortiz APRN    Authorized by: Jules Ortiz APRN  Comparison: compared with previous ECG from 10/3/2022  Similar to previous ECG  Rhythm: sinus rhythm  Rate: normal  BPM: 64  QRS axis: normal  Other findings: non-specific ST-T wave changes  Comments: QTc 381 ms  No acute changes           Assessment & Plan    Diagnosis Plan   1. Essential hypertension  ECG 12 Lead    Hepatic Function Panel    Lipid Panel    atenolol (TENORMIN) 25 MG tablet    losartan (COZAAR) 50 MG tablet      2. Mixed hyperlipidemia  ECG 12 Lead    Hepatic Function Panel    Lipid Panel      3. Grade II diastolic dysfunction  ECG 12 Lead    Hepatic Function Panel    Lipid Panel      4. Peripheral edema  ECG 12 Lead    Hepatic Function Panel    Lipid Panel      5. Precordial pain  ECG 12 Lead    Hepatic Function Panel    Lipid Panel    isosorbide mononitrate (IMDUR) 30 MG 24 hr tablet      6. Palpitations  atenolol (TENORMIN) 25 MG tablet      7. Tachycardia  atenolol (TENORMIN) 25 MG tablet      8. History of low potassium  potassium chloride (KLOR-CON M20) 20 MEQ CR tablet      9. Abnormal stress test  nitroglycerin (NITROSTAT) 0.4 MG SL tablet      1.  Patient's blood pressure is controlled on current blood pressure medication regimen.  No medication changes are warranted at this time.  Patient  advised to monitor blood pressure on a daily basis and report any persistent highs or lows.  Set goal blood pressure for patient at 130/80 or below.  2.  Patient was previously started on rosuvastatin and most recent labs done include a lipid panel.  Will prescribe a lipid panel and hepatic panel to be reperformed prior to next follow-up.  3.  Patient does have grade 2 diastolic dysfunction peripheral edema which seems to be well-controlled on the Bumex.  Will continue her current treatment without change.  4.  Patient does have some atypical chest pain that occurs very rarely.  She then is more associated with anxiety.  She did have a negative stress test in December 2022.  She says that it is unchanged since then.  If she has any changes in her chest pain or any new or worsening symptoms she will notify us and repeat ischemic workup will be performed at that time.  5.  Informed of signs and symptoms of ACS and advised to seek emergent treatment for any new worsening symptoms.  Patient also advised sooner follow-up as needed.  Also advised to follow-up with family doctor as needed  This note is dictated utilizing voice recognition software.  Although this record has been proof read, transcriptional errors may still be present. If questions occur regarding the content of this record please do not hesitate to call our office.  I have reviewed and confirmed the accuracy of the ROS as documented by the MA/LPN/RN KALA Dias    Return if symptoms worsen or fail to improve, for Next scheduled follow up.    Diagnoses and all orders for this visit:    1. Essential hypertension (Primary)  -     ECG 12 Lead  -     Hepatic Function Panel; Future  -     Lipid Panel; Future  -     atenolol (TENORMIN) 25 MG tablet; Take 1 tablet by mouth Daily.  Dispense: 90 tablet; Refill: 3  -     losartan (COZAAR) 50 MG tablet; Take 1 tablet by mouth Daily.  Dispense: 90 tablet; Refill: 3    2. Mixed hyperlipidemia  -     ECG 12  Lead  -     Hepatic Function Panel; Future  -     Lipid Panel; Future    3. Grade II diastolic dysfunction  -     ECG 12 Lead  -     Hepatic Function Panel; Future  -     Lipid Panel; Future    4. Peripheral edema  -     ECG 12 Lead  -     Hepatic Function Panel; Future  -     Lipid Panel; Future    5. Precordial pain  -     ECG 12 Lead  -     Hepatic Function Panel; Future  -     Lipid Panel; Future  -     isosorbide mononitrate (IMDUR) 30 MG 24 hr tablet; Take 0.5 tablets by mouth Daily.  Dispense: 45 tablet; Refill: 3    6. Palpitations  -     atenolol (TENORMIN) 25 MG tablet; Take 1 tablet by mouth Daily.  Dispense: 90 tablet; Refill: 3    7. Tachycardia  -     atenolol (TENORMIN) 25 MG tablet; Take 1 tablet by mouth Daily.  Dispense: 90 tablet; Refill: 3    8. History of low potassium  -     potassium chloride (KLOR-CON M20) 20 MEQ CR tablet; Take 1 tablet by mouth Daily.  Dispense: 90 tablet; Refill: 3    9. Abnormal stress test  -     nitroglycerin (NITROSTAT) 0.4 MG SL tablet; PLACE ONE TABLET UNDER THE TONGUE AS NEEDED FOR ANGINA MAY REPEAT EVERY 5 MINUTES FOR UP TO THREE DOSES  Dispense: 25 tablet; Refill: 3    Other orders  -     bumetanide (BUMEX) 2 MG tablet; Take 1 tablet by mouth Daily.  Dispense: 90 tablet; Refill: 1        Karynalorenza Beltranins  reports that she quit smoking about 22 years ago. Her smoking use included cigarettes. She has never used smokeless tobacco.     Advance Care Planning   ACP discussion was held with the patient during this visit. Patient has an advance directive in EMR which is still valid.  Patient does not have an advance directive, declines further assistance.                  MEDS ORDERED DURING VISIT:  New Medications Ordered This Visit   Medications    atenolol (TENORMIN) 25 MG tablet     Sig: Take 1 tablet by mouth Daily.     Dispense:  90 tablet     Refill:  3    bumetanide (BUMEX) 2 MG tablet     Sig: Take 1 tablet by mouth Daily.     Dispense:  90 tablet     Refill:   1     Discontinue torsemide    isosorbide mononitrate (IMDUR) 30 MG 24 hr tablet     Sig: Take 0.5 tablets by mouth Daily.     Dispense:  45 tablet     Refill:  3    losartan (COZAAR) 50 MG tablet     Sig: Take 1 tablet by mouth Daily.     Dispense:  90 tablet     Refill:  3    potassium chloride (KLOR-CON M20) 20 MEQ CR tablet     Sig: Take 1 tablet by mouth Daily.     Dispense:  90 tablet     Refill:  3    nitroglycerin (NITROSTAT) 0.4 MG SL tablet     Sig: PLACE ONE TABLET UNDER THE TONGUE AS NEEDED FOR ANGINA MAY REPEAT EVERY 5 MINUTES FOR UP TO THREE DOSES     Dispense:  25 tablet     Refill:  3           This document has been electronically signed by Jules Ortiz Jr., APRN  June 7, 2024 11:11 EDT

## 2024-07-30 LAB
MAXIMAL PREDICTED HEART RATE: 159 BPM
STRESS TARGET HR: 135 BPM

## 2024-08-20 ENCOUNTER — LAB (OUTPATIENT)
Dept: LAB | Facility: HOSPITAL | Age: 69
End: 2024-08-20
Payer: MEDICARE

## 2024-08-20 DIAGNOSIS — I10 ESSENTIAL HYPERTENSION: ICD-10-CM

## 2024-08-20 DIAGNOSIS — E78.2 MIXED HYPERLIPIDEMIA: ICD-10-CM

## 2024-08-20 DIAGNOSIS — R60.0 PERIPHERAL EDEMA: ICD-10-CM

## 2024-08-20 DIAGNOSIS — R07.2 PRECORDIAL PAIN: ICD-10-CM

## 2024-08-20 DIAGNOSIS — I51.89 GRADE II DIASTOLIC DYSFUNCTION: ICD-10-CM

## 2024-08-20 LAB
ALBUMIN SERPL-MCNC: 4 G/DL (ref 3.5–5.2)
ALP SERPL-CCNC: 58 U/L (ref 39–117)
ALT SERPL W P-5'-P-CCNC: 11 U/L (ref 1–33)
AST SERPL-CCNC: 19 U/L (ref 1–32)
BILIRUB CONJ SERPL-MCNC: <0.2 MG/DL (ref 0–0.3)
BILIRUB INDIRECT SERPL-MCNC: NORMAL MG/DL
BILIRUB SERPL-MCNC: <0.2 MG/DL (ref 0–1.2)
CHOLEST SERPL-MCNC: 172 MG/DL (ref 0–200)
HDLC SERPL-MCNC: 57 MG/DL (ref 40–60)
LDLC SERPL CALC-MCNC: 96 MG/DL (ref 0–100)
LDLC/HDLC SERPL: 1.64 {RATIO}
PROT SERPL-MCNC: 6.6 G/DL (ref 6–8.5)
TRIGL SERPL-MCNC: 107 MG/DL (ref 0–150)
VLDLC SERPL-MCNC: 19 MG/DL (ref 5–40)

## 2024-08-20 PROCEDURE — 80061 LIPID PANEL: CPT

## 2024-08-20 PROCEDURE — 80076 HEPATIC FUNCTION PANEL: CPT

## 2024-08-21 NOTE — PROGRESS NOTES
Normal liver function    Lipid panel identified triglycerides 107, HDL 57, LDL 96.  These are within goal.  Patient has no known coronary artery disease.

## 2024-08-22 ENCOUNTER — TELEPHONE (OUTPATIENT)
Dept: CARDIOLOGY | Facility: CLINIC | Age: 69
End: 2024-08-22
Payer: MEDICARE

## 2024-08-22 NOTE — TELEPHONE ENCOUNTER
LABS   Pt notified of no acute findings. Provider will discuss results at f/u. Pt reminded of appt date and time.  ----- Message from Cely MILLS sent at 8/22/2024 12:13 PM EDT -----    ----- Message -----  From: Jules Ortiz APRN  Sent: 8/21/2024   1:00 PM EDT  To: Cely Kamara MA    Normal liver function    Lipid panel identified triglycerides 107, HDL 57, LDL 96.  These are within goal.  Patient has no known coronary artery disease.

## 2024-12-02 RX ORDER — BUMETANIDE 2 MG/1
2 TABLET ORAL DAILY
Qty: 90 TABLET | Refills: 0 | Status: SHIPPED | OUTPATIENT
Start: 2024-12-02

## 2024-12-12 ENCOUNTER — HOSPITAL ENCOUNTER (OUTPATIENT)
Dept: CARDIOLOGY | Facility: HOSPITAL | Age: 69
Discharge: HOME | End: 2024-12-12
Payer: MEDICARE

## 2024-12-12 ENCOUNTER — OFFICE VISIT (OUTPATIENT)
Dept: CARDIOLOGY | Facility: CLINIC | Age: 69
End: 2024-12-12
Payer: MEDICARE

## 2024-12-12 VITALS
SYSTOLIC BLOOD PRESSURE: 131 MMHG | HEIGHT: 60 IN | WEIGHT: 222.8 LBS | DIASTOLIC BLOOD PRESSURE: 80 MMHG | HEART RATE: 85 BPM | OXYGEN SATURATION: 94 % | BODY MASS INDEX: 43.74 KG/M2

## 2024-12-12 DIAGNOSIS — G89.29 CHRONIC PAIN IN RIGHT FOOT: ICD-10-CM

## 2024-12-12 DIAGNOSIS — M79.89 SWELLING OF RIGHT FOOT: ICD-10-CM

## 2024-12-12 DIAGNOSIS — M79.671 CHRONIC PAIN IN RIGHT FOOT: ICD-10-CM

## 2024-12-12 DIAGNOSIS — M79.671 CHRONIC PAIN IN RIGHT FOOT: Primary | ICD-10-CM

## 2024-12-12 DIAGNOSIS — E11.65 TYPE 2 DIABETES MELLITUS WITH HYPERGLYCEMIA, WITHOUT LONG-TERM CURRENT USE OF INSULIN: ICD-10-CM

## 2024-12-12 DIAGNOSIS — E78.2 MIXED HYPERLIPIDEMIA: ICD-10-CM

## 2024-12-12 DIAGNOSIS — I10 ESSENTIAL HYPERTENSION: ICD-10-CM

## 2024-12-12 DIAGNOSIS — R07.2 PRECORDIAL PAIN: ICD-10-CM

## 2024-12-12 DIAGNOSIS — G89.29 CHRONIC PAIN IN RIGHT FOOT: Primary | ICD-10-CM

## 2024-12-12 PROCEDURE — 3075F SYST BP GE 130 - 139MM HG: CPT | Performed by: NURSE PRACTITIONER

## 2024-12-12 PROCEDURE — 1160F RVW MEDS BY RX/DR IN RCRD: CPT | Performed by: NURSE PRACTITIONER

## 2024-12-12 PROCEDURE — 3079F DIAST BP 80-89 MM HG: CPT | Performed by: NURSE PRACTITIONER

## 2024-12-12 PROCEDURE — 1159F MED LIST DOCD IN RCRD: CPT | Performed by: NURSE PRACTITIONER

## 2024-12-12 PROCEDURE — 99214 OFFICE O/P EST MOD 30 MIN: CPT | Performed by: NURSE PRACTITIONER

## 2024-12-12 PROCEDURE — 93971 EXTREMITY STUDY: CPT

## 2024-12-12 PROCEDURE — 93926 LOWER EXTREMITY STUDY: CPT

## 2024-12-12 RX ORDER — DIPHENHYDRAMINE, LIDOCAINE, NYSTATIN
5 KIT ORAL 4 TIMES DAILY
COMMUNITY

## 2024-12-12 RX ORDER — SUCRALFATE 1 G/1
1 TABLET ORAL 4 TIMES DAILY
COMMUNITY

## 2024-12-12 RX ORDER — MAGNESIUM OXIDE 400 MG/1
400 TABLET ORAL DAILY
COMMUNITY

## 2024-12-12 RX ORDER — VONOPRAZAN FUMARATE 26.72 MG/1
20 TABLET ORAL DAILY
COMMUNITY

## 2024-12-12 NOTE — PROGRESS NOTES
Subjective     Karyna Stewart is a 69 y.o. female who presents to day for Follow-up, Hypertension, and Nausea (Is vomiting due to pain in Right foot. Patient states she can not eat  due to pain . ).    CHIEF COMPLIANT  Chief Complaint   Patient presents with    Follow-up    Hypertension    Nausea     Is vomiting due to pain in Right foot. Patient states she can not eat  due to pain .        Active Problems:  Problem List Items Addressed This Visit          Cardiac and Vasculature    Chest pain    Essential hypertension    Mixed hyperlipidemia     Other Visit Diagnoses       Chronic pain in right foot    -  Primary    Relevant Orders    Duplex Lower Extremity Art / Grafts - Right CAR    Duplex Venous Lower Extremity - Right CAR    Swelling of right foot        Relevant Orders    Duplex Lower Extremity Art / Grafts - Right CAR    Duplex Venous Lower Extremity - Right CAR    Type 2 diabetes mellitus with hyperglycemia, without long-term current use of insulin        Relevant Orders    Duplex Lower Extremity Art / Grafts - Right CAR        PROBLEM LIST:      1. Chest pain   1.1 C 3/26/18 - normal coronary arteries; normal right heart pressures,  1.1 stress test 12/22: Negative for ischemia, preserved post-rest ejection fraction 75%  2. Shortness of breath   2.1 echo 12/2022: EF greater than 65%, grade 2 diastolic dysfunction mild to moderate MR mild tricuspid regurgitation, pulmonary artery systolic pressures in the low 40  3. Edema  4. Hypertension   5. Hyperlipidemia   6. Palpitations  6.1 event monitor 10/1-10/14/2020-normal sinus rhythm, sinus arrhythmia    HPI  HPI  Ms. Karyna Lewis is a 69-year-old female patient who is being followed up today for chronic arterial hypertension and hyperlipidemia.     Patient does have a history of chronic arterial hypertension which she is being treated with losartan, isosorbide, atenolol, amlodipine, and Dyazide. Today her blood pressure is 131/80 and heart rate of  85.    Patient reports that she is having significant right foot pain from the middle of her ankle back to her heel.  It is severe.  Even causing her to vomit which was noted in the office today.  She denies any significant injury reports that this has been going on since the last of August.  She has seen her primary care and was scheduled for an MRI but due to her bladder stimulator that they were unable to put in MRI mode that they downgraded it to a CT scan.  Patient has her CT today at 1230.  She is also seeing rheumatology.  She is also noted swelling in her right lower leg as well.  Mainly in her foot and her toes.  She says it sort of comes in waves almost like a pulsating burning and ripping type pain.  She says it so severe she has to walk on the side of her foot.    Patient continues to have some atypical chest tightness that is mainly associated with anxiety.  She did go under a stress test in 2022 that was negative for ischemia.  She reports her chest tightness is the same as what it was previously and has not really changed.  PRIOR MEDS  Current Outpatient Medications on File Prior to Visit   Medication Sig Dispense Refill    albuterol sulfate  (90 Base) MCG/ACT inhaler INHALE 2 PUFFS BY MOUTH EVERY 4 TO 6 HOURS AS NEEDED FOR COUGH, WHEEZING, OR SHORTNESS OF BREATH. USE WITH VORTEX SPACER.      amLODIPine (NORVASC) 10 MG tablet Take 1 tablet by mouth Daily. 90 tablet 3    atenolol (TENORMIN) 25 MG tablet Take 1 tablet by mouth Daily. 90 tablet 3    bumetanide (BUMEX) 2 MG tablet Take 1 tablet by mouth once daily 90 tablet 0    EPINEPHrine (EPIPEN) 0.3 MG/0.3ML solution auto-injector injection       isosorbide mononitrate (IMDUR) 30 MG 24 hr tablet Take 0.5 tablets by mouth Daily. 45 tablet 3    leflunomide (ARAVA) 20 MG tablet Take 1 tablet by mouth Daily.      levocetirizine (XYZAL) 5 MG tablet Take 1 tablet by mouth Every Evening.      losartan (COZAAR) 50 MG tablet Take 1 tablet by mouth  Daily. 90 tablet 3    magnesium oxide (MAG-OX) 400 MG tablet Take 1 tablet by mouth Daily.      meloxicam (MOBIC) 15 MG tablet Take 1 tablet by mouth Daily.      montelukast (SINGULAIR) 10 MG tablet Take 1 tablet by mouth Every Night.      nitroglycerin (NITROSTAT) 0.4 MG SL tablet PLACE ONE TABLET UNDER THE TONGUE AS NEEDED FOR ANGINA MAY REPEAT EVERY 5 MINUTES FOR UP TO THREE DOSES 25 tablet 3    nystatin susp + lidocaine viscous (MAGIC MOUTHWASH) oral suspension Swish and swallow 5 mL 4 (Four) Times a Day.      potassium chloride (KLOR-CON M20) 20 MEQ CR tablet Take 1 tablet by mouth Daily. 90 tablet 3    predniSONE (DELTASONE) 5 MG tablet Take 1 tablet by mouth Daily.      rosuvastatin (CRESTOR) 20 MG tablet Take 1 tablet by mouth Daily. 30 tablet 11    sucralfate (CARAFATE) 1 g tablet Take 1 tablet by mouth 4 (Four) Times a Day.      traMADol (ULTRAM) 50 MG tablet Take 1 tablet by mouth 3 (Three) Times a Day.      Vonoprazan Fumarate (Voquezna) 20 MG tablet Take 1 tablet by mouth Daily.      [DISCONTINUED] aspirin 81 MG EC tablet Take 1 tablet by mouth Daily.      [DISCONTINUED] Calcium Carb-Cholecalciferol (CALCIUM 500 + D PO) Take  by mouth.      [DISCONTINUED] dexlansoprazole (Dexilant) 30 MG capsule Take 1 capsule by mouth Daily.      [DISCONTINUED] Fluticasone Propionate, Inhal, 250 MCG/ACT aerosol powder  Inhale.      [DISCONTINUED] NON FORMULARY Take  by mouth. Neurogel in orabase: Benzocaine 16.7% carba 4.48% AMITR 1.79% cream -- Apply to affected area 4-5 times daily      [DISCONTINUED] triamterene-hydrochlorothiazide (DYAZIDE) 37.5-25 MG per capsule Take 1 capsule by mouth Every Morning.      [DISCONTINUED] vitamin D (ERGOCALCIFEROL) 1.25 MG (81659 UT) capsule capsule Take 1 capsule by mouth 1 (One) Time Per Week.       No current facility-administered medications on file prior to visit.       ALLERGIES  Diovan [valsartan] and Iodine    HISTORY  Past Medical History:   Diagnosis Date    Anxiety and  "depression     Arthritis     Asthma     Chest pain     Chronic cough     Costochondritis     COVID-19 2022    COVID-19 vaccine administered 1st - 2021    2nd - 04/15/2021 - Moderna ( Booster scheduled for 2021 Moderna )     Diabetes     Fibromyalgia     Foot pain     Right foot    GERD (gastroesophageal reflux disease)     High triglycerides     Hypertension     Irritable bowel syndrome     Lupus (systemic lupus erythematosus)     Migraines     Osteoporosis     Scleroderma     Seasonal allergies     Ulcerative colitis        Social History     Socioeconomic History    Marital status:     Number of children: 0   Tobacco Use    Smoking status: Former     Current packs/day: 0.00     Types: Cigarettes     Quit date:      Years since quittin.9    Smokeless tobacco: Never    Tobacco comments:     quit 15 yrs ago    Vaping Use    Vaping status: Never Used   Substance and Sexual Activity    Alcohol use: No    Drug use: No    Sexual activity: Defer       Family History   Problem Relation Age of Onset    COPD Mother     Hypertension Mother     Diverticulitis Father 45    No Known Problems Son     No Known Problems Daughter     Stomach cancer Paternal Grandmother        Review of Systems   Constitutional:  Negative for chills, fatigue and fever.   HENT:  Positive for congestion (having a lot of flem). Negative for rhinorrhea and sore throat.    Eyes:  Negative for visual disturbance.   Respiratory:  Positive for apnea (does not tolerate cpap). Negative for shortness of breath.    Cardiovascular:  Positive for chest pain and leg swelling (right foot). Negative for palpitations.   Gastrointestinal:  Positive for nausea and vomiting.   Musculoskeletal:  Positive for myalgias (right foot).   Psychiatric/Behavioral:  Negative for agitation.        Objective     VITALS: /80 (BP Location: Left arm, Patient Position: Sitting, Cuff Size: Adult)   Pulse 85   Ht 152 cm (59.84\")   Wt 101 kg (222 " lb 12.8 oz)   SpO2 94%   BMI 43.74 kg/m²     LABS:   Lab Results (most recent)       None            IMAGING:   No Images in the past 120 days found..    EXAM:  Physical Exam  Vitals and nursing note reviewed.   Constitutional:       Appearance: She is well-developed.   HENT:      Head: Normocephalic.   Neck:      Thyroid: No thyroid mass.      Vascular: No carotid bruit or JVD.      Trachea: Trachea and phonation normal.   Cardiovascular:      Rate and Rhythm: Normal rate and regular rhythm.      Pulses:           Radial pulses are 2+ on the right side and 2+ on the left side.        Dorsalis pedis pulses are 2+ on the right side.        Posterior tibial pulses are 2+ on the right side and 2+ on the left side.      Heart sounds: Normal heart sounds. No murmur heard.     No friction rub. No gallop.   Pulmonary:      Effort: Pulmonary effort is normal. No respiratory distress.      Breath sounds: Normal breath sounds. No wheezing or rales.   Musculoskeletal:         General: Swelling present. Normal range of motion.      Cervical back: Neck supple.   Skin:     General: Skin is warm and dry.      Capillary Refill: Capillary refill takes less than 2 seconds.      Findings: No rash.   Neurological:      Mental Status: She is alert and oriented to person, place, and time.   Psychiatric:         Mood and Affect: Mood is anxious. Affect is tearful.         Speech: Speech normal.         Behavior: Behavior normal.         Thought Content: Thought content normal.         Judgment: Judgment normal.         Procedure   Procedures       Assessment & Plan    Diagnosis Plan   1. Chronic pain in right foot  Duplex Lower Extremity Art / Grafts - Right CAR    Duplex Venous Lower Extremity - Right CAR      2. Swelling of right foot  Duplex Lower Extremity Art / Grafts - Right CAR    Duplex Venous Lower Extremity - Right CAR      3. Type 2 diabetes mellitus with hyperglycemia, without long-term current use of insulin  Duplex Lower  Extremity Art / Grafts - Right CAR      4. Precordial pain        5. Essential hypertension        6. Mixed hyperlipidemia        1.  Patient does have persistent pain in her foot since August.  She has no known injury.  She is having a CT of her foot today.  Previously was scheduled for MRI but is unable to complete.  Due to her extreme pain I do think it is appropriate to evaluate both the venous and arterial structures in her lower extremity.  She does have some swelling noted.    2.  Patient continues to have atypical chest pain.  Unchanged nonprogressive.  Will continue to monitor at this time.  If pain changes or persist we will repeat stress test.    3.  Patient's blood pressure is controlled on current blood pressure medication regimen.  No medication changes are warranted at this time.  Patient advised to monitor blood pressure on a daily basis and report any persistent highs or lows.  Set goal blood pressure for patient at 130/80 or below.    4.  Informed of signs and symptoms of ACS and advised to seek emergent treatment for any new worsening symptoms.  Patient also advised sooner follow-up as needed.  Also advised to follow-up with family doctor as needed  This note is dictated utilizing voice recognition software.  Although this record has been proof read, transcriptional errors may still be present. If questions occur regarding the content of this record please do not hesitate to call our office.  I have reviewed and confirmed the accuracy of the ROS as documented by the MA/LPN/RN KALA Dias    Return if symptoms worsen or fail to improve, for Next scheduled follow up.    Diagnoses and all orders for this visit:    1. Chronic pain in right foot (Primary)  -     Duplex Lower Extremity Art / Grafts - Right CAR; Future  -     Duplex Venous Lower Extremity - Right CAR; Future    2. Swelling of right foot  -     Duplex Lower Extremity Art / Grafts - Right CAR; Future  -     Duplex Venous Lower  Extremity - Right CAR; Future    3. Type 2 diabetes mellitus with hyperglycemia, without long-term current use of insulin  -     Duplex Lower Extremity Art / Grafts - Right CAR; Future    4. Precordial pain    5. Essential hypertension    6. Mixed hyperlipidemia        Karyna Stewart  reports that she quit smoking about 22 years ago. Her smoking use included cigarettes. She has never used smokeless tobacco. I have educated her on the risk of diseases from using tobacco products such as cancer, COPD, and heart disease.     I advised her to quit and she is not willing to quit.    I spent 3  minutes counseling the patient.           Class 3 Severe Obesity (BMI >=40). Obesity-related health conditions include the following: none. Obesity is unchanged. BMI is is above average; BMI management plan is completed. We discussed portion control and increasing exercise.           MEDS ORDERED DURING VISIT:  No orders of the defined types were placed in this encounter.          This document has been electronically signed by Jules Ortiz Jr., APRN  December 12, 2024 12:55 EST

## 2024-12-18 ENCOUNTER — TELEPHONE (OUTPATIENT)
Dept: CARDIOLOGY | Facility: CLINIC | Age: 69
End: 2024-12-18
Payer: MEDICARE

## 2024-12-18 NOTE — TELEPHONE ENCOUNTER
US ARTERIAL/VENOUS  Pt notified of no acute findings. Provider will discuss results at f/u. Pt reminded of appt date and time.  ----- Message from Cely MILLS sent at 12/18/2024  9:48 AM EST -----    ----- Message -----  From: Jules Ortiz APRN  Sent: 12/17/2024   5:59 PM EST  To: Cley Kamara MA    No evidence of DVT.  Keep follow-up.

## 2025-01-14 ENCOUNTER — TELEPHONE (OUTPATIENT)
Dept: CARDIOLOGY | Facility: CLINIC | Age: 70
End: 2025-01-14
Payer: MEDICARE

## 2025-01-14 NOTE — TELEPHONE ENCOUNTER
Patient's rosuvastatin interacts with leflunomide.  The luflunomide increases the dose of rosuvastatin, the max dose of rosuvastatin 10mg while on leflunomide. Okay to change rx to rosuvastatin 10mg once daily?

## 2025-01-15 RX ORDER — ROSUVASTATIN CALCIUM 10 MG/1
10 TABLET, COATED ORAL DAILY
Qty: 90 TABLET | Refills: 3 | Status: SHIPPED | OUTPATIENT
Start: 2025-01-15

## 2025-02-24 DIAGNOSIS — I10 ESSENTIAL HYPERTENSION: ICD-10-CM

## 2025-02-24 RX ORDER — AMLODIPINE BESYLATE 10 MG/1
10 TABLET ORAL DAILY
Qty: 90 TABLET | Refills: 0 | Status: SHIPPED | OUTPATIENT
Start: 2025-02-24

## 2025-03-03 RX ORDER — BUMETANIDE 2 MG/1
2 TABLET ORAL DAILY
Qty: 90 TABLET | Refills: 0 | Status: SHIPPED | OUTPATIENT
Start: 2025-03-03

## 2025-05-22 DIAGNOSIS — I10 ESSENTIAL HYPERTENSION: ICD-10-CM

## 2025-05-22 RX ORDER — AMLODIPINE BESYLATE 10 MG/1
10 TABLET ORAL DAILY
Qty: 90 TABLET | Refills: 0 | Status: SHIPPED | OUTPATIENT
Start: 2025-05-22

## 2025-05-22 NOTE — TELEPHONE ENCOUNTER
Name from pharmacy: amLODIPine Besylate 10 MG Oral Tablet         Will file in chart as: amLODIPine (NORVASC) 10 MG tablet    Sig: Take 1 tablet by mouth once daily    Disp: 90 tablet    Refills: 0    Start: 5/22/2025    Class: Normal    Non-formulary For: Essential hypertension    Last ordered: 2 months ago (2/24/2025) by KALA Dias    Last refill: 2/24/2025    Rx #: [id number]    Calcium-Channel Blockers Protocol [id number]/22/2025 04:47 AM   Protocol Details Normal serum potassium in past 12 months    GFR> 30 ml/min in past 12 months    No active pregnancy on record    BP under 140/90 in past year    No positive pregnancy test in past 12 months    Recent or future visit with authorizing provider

## 2025-06-02 RX ORDER — BUMETANIDE 2 MG/1
2 TABLET ORAL DAILY
Qty: 90 TABLET | Refills: 3 | Status: SHIPPED | OUTPATIENT
Start: 2025-06-02

## 2025-06-23 DIAGNOSIS — R07.2 PRECORDIAL PAIN: ICD-10-CM

## 2025-06-23 RX ORDER — ISOSORBIDE MONONITRATE 30 MG/1
15 TABLET, EXTENDED RELEASE ORAL DAILY
Qty: 45 TABLET | Refills: 3 | Status: SHIPPED | OUTPATIENT
Start: 2025-06-23

## 2025-07-03 DIAGNOSIS — I10 ESSENTIAL HYPERTENSION: ICD-10-CM

## 2025-07-03 RX ORDER — LOSARTAN POTASSIUM 50 MG/1
50 TABLET ORAL DAILY
Qty: 90 TABLET | Refills: 3 | Status: SHIPPED | OUTPATIENT
Start: 2025-07-03

## 2025-07-24 ENCOUNTER — OFFICE VISIT (OUTPATIENT)
Dept: CARDIOLOGY | Facility: CLINIC | Age: 70
End: 2025-07-24
Payer: MEDICARE

## 2025-07-24 VITALS
BODY MASS INDEX: 38.71 KG/M2 | SYSTOLIC BLOOD PRESSURE: 115 MMHG | HEART RATE: 105 BPM | DIASTOLIC BLOOD PRESSURE: 72 MMHG | HEIGHT: 59 IN | WEIGHT: 192 LBS

## 2025-07-24 DIAGNOSIS — E78.2 MIXED HYPERLIPIDEMIA: ICD-10-CM

## 2025-07-24 DIAGNOSIS — I10 ESSENTIAL HYPERTENSION: Primary | ICD-10-CM

## 2025-07-24 DIAGNOSIS — R60.0 LOWER EXTREMITY EDEMA: ICD-10-CM

## 2025-07-24 PROCEDURE — 3078F DIAST BP <80 MM HG: CPT | Performed by: PHYSICIAN ASSISTANT

## 2025-07-24 PROCEDURE — 3074F SYST BP LT 130 MM HG: CPT | Performed by: PHYSICIAN ASSISTANT

## 2025-07-24 PROCEDURE — 99213 OFFICE O/P EST LOW 20 MIN: CPT | Performed by: PHYSICIAN ASSISTANT

## 2025-07-24 NOTE — PROGRESS NOTES
Problem list     Subjective   Karyna Stewart is a 69 y.o. female     Chief Complaint   Patient presents with    Testing     Testing follow up  Essential HTN         PROBLEM LIST:      1. Chest pain   1.1 C 3/26/18 - normal coronary arteries; normal right heart pressures,  1.1 stress test 12/22: Negative for ischemia, preserved post-rest ejection fraction 75%  2. Shortness of breath   2.1 echo 12/2022: EF greater than 65%, grade 2 diastolic dysfunction mild to moderate MR mild tricuspid regurgitation, pulmonary artery systolic pressures in the low 40  3. Edema  4. Hypertension   5. Hyperlipidemia   6. Palpitations  6.1 event monitor 10/1-10/14/2020-normal sinus rhythm, sinus arrhythmia     HPI    Patient is a 69-year-old female presenting back to the office for routine cardiac assessment.  Patient has done well from a cardiac standpoint.  She does not complain of any chest pain or pressure at all.  No complaints of any dyspnea.  No PND or orthopnea.    She does not describe palpitating nor does he complain of dysrhythmic symptoms.    She describes having difficulty with the lower extremities in regards to pain on the bottom of her feet and even a burning type sensation.  Otherwise, she has been stable.      Current Outpatient Medications on File Prior to Visit   Medication Sig Dispense Refill    albuterol sulfate  (90 Base) MCG/ACT inhaler INHALE 2 PUFFS BY MOUTH EVERY 4 TO 6 HOURS AS NEEDED FOR COUGH, WHEEZING, OR SHORTNESS OF BREATH. USE WITH VORTEX SPACER.      amLODIPine (NORVASC) 10 MG tablet Take 1 tablet by mouth once daily 90 tablet 0    atenolol (TENORMIN) 25 MG tablet Take 1 tablet by mouth Daily. 90 tablet 3    bumetanide (BUMEX) 2 MG tablet Take 1 tablet by mouth once daily 90 tablet 3    EPINEPHrine (EPIPEN) 0.3 MG/0.3ML solution auto-injector injection       isosorbide mononitrate (IMDUR) 30 MG 24 hr tablet Take 1/2 (one-half) tablet by mouth once daily 45 tablet 3    leflunomide (ARAVA)  20 MG tablet Take 1 tablet by mouth Daily.      levocetirizine (XYZAL) 5 MG tablet Take 1 tablet by mouth Every Evening.      losartan (COZAAR) 50 MG tablet Take 1 tablet by mouth once daily 90 tablet 3    magnesium oxide (MAG-OX) 400 MG tablet Take 1 tablet by mouth Daily.      meloxicam (MOBIC) 15 MG tablet Take 1 tablet by mouth Daily.      montelukast (SINGULAIR) 10 MG tablet Take 1 tablet by mouth Every Night.      nitroglycerin (NITROSTAT) 0.4 MG SL tablet PLACE ONE TABLET UNDER THE TONGUE AS NEEDED FOR ANGINA MAY REPEAT EVERY 5 MINUTES FOR UP TO THREE DOSES 25 tablet 3    nystatin susp + lidocaine viscous (MAGIC MOUTHWASH) oral suspension Swish and swallow 5 mL 4 (Four) Times a Day.      potassium chloride (KLOR-CON M20) 20 MEQ CR tablet Take 1 tablet by mouth Daily. 90 tablet 3    predniSONE (DELTASONE) 5 MG tablet Take 1 tablet by mouth Daily.      rosuvastatin (CRESTOR) 10 MG tablet Take 1 tablet by mouth Daily. 90 tablet 3    sucralfate (CARAFATE) 1 g tablet Take 1 tablet by mouth 4 (Four) Times a Day.      traMADol (ULTRAM) 50 MG tablet Take 1 tablet by mouth 3 (Three) Times a Day.      Vonoprazan Fumarate (Voquezna) 20 MG tablet Take 1 tablet by mouth Daily.       No current facility-administered medications on file prior to visit.       Diovan [valsartan] and Iodine    Past Medical History:   Diagnosis Date    Anxiety and depression     Arthritis     Asthma     Chest pain     Chronic cough     Costochondritis     COVID-19 09/28/2022    COVID-19 vaccine administered 1st - 03/11/2021    2nd - 04/15/2021 - Moderna ( Booster scheduled for 12/23/2021 Moderna )     Diabetes     Fibromyalgia     Foot pain     Right foot    GERD (gastroesophageal reflux disease)     High triglycerides     Hypertension     Irritable bowel syndrome     Lupus (systemic lupus erythematosus)     Migraines     Osteoporosis     Scleroderma     Seasonal allergies     Ulcerative colitis        Social History     Socioeconomic History  "   Marital status:     Number of children: 0   Tobacco Use    Smoking status: Former     Current packs/day: 0.00     Types: Cigarettes     Quit date:      Years since quittin.5    Smokeless tobacco: Never    Tobacco comments:     quit 15 yrs ago    Vaping Use    Vaping status: Never Used   Substance and Sexual Activity    Alcohol use: No    Drug use: No    Sexual activity: Defer       Family History   Problem Relation Age of Onset    COPD Mother     Hypertension Mother     Diverticulitis Father 45    No Known Problems Son     No Known Problems Daughter     Stomach cancer Paternal Grandmother        Review of Systems   Constitutional: Negative.  Negative for activity change, appetite change, chills, fatigue and fever.   HENT: Negative.  Negative for congestion, sinus pressure and sinus pain.    Eyes: Negative.  Negative for visual disturbance.   Respiratory:  Positive for apnea. Negative for cough, chest tightness, shortness of breath and wheezing.    Cardiovascular:  Negative for chest pain, palpitations and leg swelling.   Gastrointestinal:  Negative for blood in stool.   Endocrine: Negative.  Negative for cold intolerance and heat intolerance.   Genitourinary: Negative.  Negative for hematuria.   Musculoskeletal:  Negative for gait problem.   Skin: Negative.  Negative for color change, rash and wound.   Allergic/Immunologic: Negative.  Negative for environmental allergies and food allergies.   Neurological:  Negative for dizziness, syncope, weakness, light-headedness, numbness and headaches.   Hematological: Negative.  Does not bruise/bleed easily.   Psychiatric/Behavioral: Negative.  Negative for sleep disturbance.        Objective   Vitals:    25 0914   BP: 115/72   BP Location: Right arm   Patient Position: Sitting   Cuff Size: Adult   Pulse: 105   Weight: 87.1 kg (192 lb)   Height: 149.9 cm (59\")      /72 (BP Location: Right arm, Patient Position: Sitting, Cuff Size: Adult)   " "Pulse 105   Ht 149.9 cm (59\")   Wt 87.1 kg (192 lb)   BMI 38.78 kg/m²     Lab Results (most recent)       None            Physical Exam  Vitals and nursing note reviewed.   Constitutional:       General: She is not in acute distress.     Appearance: Normal appearance. She is well-developed.   HENT:      Head: Normocephalic and atraumatic.   Eyes:      General: No scleral icterus.        Right eye: No discharge.         Left eye: No discharge.      Conjunctiva/sclera: Conjunctivae normal.   Neck:      Vascular: No carotid bruit.   Cardiovascular:      Rate and Rhythm: Normal rate and regular rhythm.      Heart sounds: Normal heart sounds. No murmur heard.     No friction rub. No gallop.   Pulmonary:      Effort: Pulmonary effort is normal. No respiratory distress.      Breath sounds: Normal breath sounds. No wheezing or rales.   Chest:      Chest wall: No tenderness.   Musculoskeletal:      Right lower leg: Edema present.      Left lower leg: Edema present.      Comments: Trace to mild bilaterally   Skin:     General: Skin is warm and dry.      Coloration: Skin is not pale.      Findings: No erythema or rash.   Neurological:      Mental Status: She is alert and oriented to person, place, and time.      Cranial Nerves: No cranial nerve deficit.   Psychiatric:         Behavior: Behavior normal.         Procedure   Procedures       Assessment & Plan     Problems Addressed this Visit          Cardiac and Vasculature    Essential hypertension - Primary    Mixed hyperlipidemia       Symptoms and Signs    Lower extremity edema     Diagnoses         Codes Comments      Essential hypertension    -  Primary ICD-10-CM: I10  ICD-9-CM: 401.9       Mixed hyperlipidemia     ICD-10-CM: E78.2  ICD-9-CM: 272.2       Lower extremity edema     ICD-10-CM: R60.0  ICD-9-CM: 782.3               Recommendations  1.  Patient is a 69-year-old female presented back to the office for follow-up.  Patient has done well.  She has history of " baseline hypertension.  Patient feels well at this time.  Patient's blood pressure is controlled.  We will monitor for now.    2 GERD patient with dyslipidemia on statin therapy.  We will continue rosuvastatin at this time.    3.  Patient's edema appears manageable and only minimal to mild on exam.  For now, no changes.    4.  I feel much of her symptoms of pain in the lower extremities is related to neuropathy.  We can monitor for now and see her back for follow-up in 6 months to a year.  She has labs monitored by primary.  Follow-up with primary as scheduled.         Karyna AVILA Pat  reports that she quit smoking about 23 years ago. Her smoking use included cigarettes. She has never used smokeless tobacco.     Patient did not bring med list or medicine bottles to appointment, med list has been reviewed and updated based on patient's knowledge of their meds.      Advance Care Planning   ACP discussion was declined by the patient. Patient does not have an advance directive, declines further assistance.              Electronically signed by:

## 2025-07-24 NOTE — LETTER
July 24, 2025     Kilo Lopez MD  [address] [place] [address]  [address]  [facility] [address]    Patient: Karyna Stewart   YOB: 1955   Date of Visit: 7/24/2025       Dear Kilo Lopez MD    Karyna Stewart was in my office today. Below is a copy of my note.    If you have questions, please do not hesitate to call me. I look forward to following Karyna along with you.         Sincerely,        ESTER Wynn        CC: No Recipients    Problem list     Subjective  Karyna Stewart is a 69 y.o. female     Chief Complaint   Patient presents with   • Testing     Testing follow up  Essential HTN         PROBLEM LIST:      1. Chest pain   1.1 C 3/26/18 - normal coronary arteries; normal right heart pressures,  1.1 stress test 12/22: Negative for ischemia, preserved post-rest ejection fraction 75%  2. Shortness of breath   2.1 echo 12/2022: EF greater than 65%, grade 2 diastolic dysfunction mild to moderate MR mild tricuspid regurgitation, pulmonary artery systolic pressures in the low 40  3. Edema  4. Hypertension   5. Hyperlipidemia   6. Palpitations  6.1 event monitor 10/1-10/14/2020-normal sinus rhythm, sinus arrhythmia     HPI    Patient is a 69-year-old female presenting back to the office for routine cardiac assessment.  Patient has done well from a cardiac standpoint.  She does not complain of any chest pain or pressure at all.  No complaints of any dyspnea.  No PND or orthopnea.    She does not describe palpitating nor does he complain of dysrhythmic symptoms.    She describes having difficulty with the lower extremities in regards to pain on the bottom of her feet and even a burning type sensation.  Otherwise, she has been stable.      Current Outpatient Medications on File Prior to Visit   Medication Sig Dispense Refill   • albuterol sulfate  (90 Base) MCG/ACT inhaler INHALE 2 PUFFS BY MOUTH EVERY 4 TO 6 HOURS AS NEEDED FOR COUGH, WHEEZING, OR SHORTNESS OF BREATH.  USE WITH VORTEX SPACER.     • amLODIPine (NORVASC) 10 MG tablet Take 1 tablet by mouth once daily 90 tablet 0   • atenolol (TENORMIN) 25 MG tablet Take 1 tablet by mouth Daily. 90 tablet 3   • bumetanide (BUMEX) 2 MG tablet Take 1 tablet by mouth once daily 90 tablet 3   • EPINEPHrine (EPIPEN) 0.3 MG/0.3ML solution auto-injector injection      • isosorbide mononitrate (IMDUR) 30 MG 24 hr tablet Take 1/2 (one-half) tablet by mouth once daily 45 tablet 3   • leflunomide (ARAVA) 20 MG tablet Take 1 tablet by mouth Daily.     • levocetirizine (XYZAL) 5 MG tablet Take 1 tablet by mouth Every Evening.     • losartan (COZAAR) 50 MG tablet Take 1 tablet by mouth once daily 90 tablet 3   • magnesium oxide (MAG-OX) 400 MG tablet Take 1 tablet by mouth Daily.     • meloxicam (MOBIC) 15 MG tablet Take 1 tablet by mouth Daily.     • montelukast (SINGULAIR) 10 MG tablet Take 1 tablet by mouth Every Night.     • nitroglycerin (NITROSTAT) 0.4 MG SL tablet PLACE ONE TABLET UNDER THE TONGUE AS NEEDED FOR ANGINA MAY REPEAT EVERY 5 MINUTES FOR UP TO THREE DOSES 25 tablet 3   • nystatin susp + lidocaine viscous (MAGIC MOUTHWASH) oral suspension Swish and swallow 5 mL 4 (Four) Times a Day.     • potassium chloride (KLOR-CON M20) 20 MEQ CR tablet Take 1 tablet by mouth Daily. 90 tablet 3   • predniSONE (DELTASONE) 5 MG tablet Take 1 tablet by mouth Daily.     • rosuvastatin (CRESTOR) 10 MG tablet Take 1 tablet by mouth Daily. 90 tablet 3   • sucralfate (CARAFATE) 1 g tablet Take 1 tablet by mouth 4 (Four) Times a Day.     • traMADol (ULTRAM) 50 MG tablet Take 1 tablet by mouth 3 (Three) Times a Day.     • Vonoprazan Fumarate (Voquezna) 20 MG tablet Take 1 tablet by mouth Daily.       No current facility-administered medications on file prior to visit.       Diovan [valsartan] and Iodine    Past Medical History:   Diagnosis Date   • Anxiety and depression    • Arthritis    • Asthma    • Chest pain    • Chronic cough    • Costochondritis     • COVID-19 2022   • COVID-19 vaccine administered 1st - 2021    2nd - 04/15/2021 - Moderna ( Booster scheduled for 2021 Moderna )    • Diabetes    • Fibromyalgia    • Foot pain     Right foot   • GERD (gastroesophageal reflux disease)    • High triglycerides    • Hypertension    • Irritable bowel syndrome    • Lupus (systemic lupus erythematosus)    • Migraines    • Osteoporosis    • Scleroderma    • Seasonal allergies    • Ulcerative colitis        Social History     Socioeconomic History   • Marital status:    • Number of children: 0   Tobacco Use   • Smoking status: Former     Current packs/day: 0.00     Types: Cigarettes     Quit date:      Years since quittin.5   • Smokeless tobacco: Never   • Tobacco comments:     quit 15 yrs ago    Vaping Use   • Vaping status: Never Used   Substance and Sexual Activity   • Alcohol use: No   • Drug use: No   • Sexual activity: Defer       Family History   Problem Relation Age of Onset   • COPD Mother    • Hypertension Mother    • Diverticulitis Father 45   • No Known Problems Son    • No Known Problems Daughter    • Stomach cancer Paternal Grandmother        Review of Systems   Constitutional: Negative.  Negative for activity change, appetite change, chills, fatigue and fever.   HENT: Negative.  Negative for congestion, sinus pressure and sinus pain.    Eyes: Negative.  Negative for visual disturbance.   Respiratory:  Positive for apnea. Negative for cough, chest tightness, shortness of breath and wheezing.    Cardiovascular:  Negative for chest pain, palpitations and leg swelling.   Gastrointestinal:  Negative for blood in stool.   Endocrine: Negative.  Negative for cold intolerance and heat intolerance.   Genitourinary: Negative.  Negative for hematuria.   Musculoskeletal:  Negative for gait problem.   Skin: Negative.  Negative for color change, rash and wound.   Allergic/Immunologic: Negative.  Negative for environmental allergies and  "food allergies.   Neurological:  Negative for dizziness, syncope, weakness, light-headedness, numbness and headaches.   Hematological: Negative.  Does not bruise/bleed easily.   Psychiatric/Behavioral: Negative.  Negative for sleep disturbance.        Objective  Vitals:    07/24/25 0914   BP: 115/72   BP Location: Right arm   Patient Position: Sitting   Cuff Size: Adult   Pulse: 105   Weight: 87.1 kg (192 lb)   Height: 149.9 cm (59\")      /72 (BP Location: Right arm, Patient Position: Sitting, Cuff Size: Adult)   Pulse 105   Ht 149.9 cm (59\")   Wt 87.1 kg (192 lb)   BMI 38.78 kg/m²     Lab Results (most recent)       None            Physical Exam  Vitals and nursing note reviewed.   Constitutional:       General: She is not in acute distress.     Appearance: Normal appearance. She is well-developed.   HENT:      Head: Normocephalic and atraumatic.   Eyes:      General: No scleral icterus.        Right eye: No discharge.         Left eye: No discharge.      Conjunctiva/sclera: Conjunctivae normal.   Neck:      Vascular: No carotid bruit.   Cardiovascular:      Rate and Rhythm: Normal rate and regular rhythm.      Heart sounds: Normal heart sounds. No murmur heard.     No friction rub. No gallop.   Pulmonary:      Effort: Pulmonary effort is normal. No respiratory distress.      Breath sounds: Normal breath sounds. No wheezing or rales.   Chest:      Chest wall: No tenderness.   Musculoskeletal:      Right lower leg: Edema present.      Left lower leg: Edema present.      Comments: Trace to mild bilaterally   Skin:     General: Skin is warm and dry.      Coloration: Skin is not pale.      Findings: No erythema or rash.   Neurological:      Mental Status: She is alert and oriented to person, place, and time.      Cranial Nerves: No cranial nerve deficit.   Psychiatric:         Behavior: Behavior normal.         Procedure  Procedures       Assessment & Plan    Problems Addressed this Visit          Cardiac and " Vasculature    Essential hypertension - Primary    Mixed hyperlipidemia       Symptoms and Signs    Lower extremity edema     Diagnoses         Codes Comments      Essential hypertension    -  Primary ICD-10-CM: I10  ICD-9-CM: 401.9       Mixed hyperlipidemia     ICD-10-CM: E78.2  ICD-9-CM: 272.2       Lower extremity edema     ICD-10-CM: R60.0  ICD-9-CM: 782.3               Recommendations  1.  Patient is a 69-year-old female presented back to the office for follow-up.  Patient has done well.  She has history of baseline hypertension.  Patient feels well at this time.  Patient's blood pressure is controlled.  We will monitor for now.    2 GERD patient with dyslipidemia on statin therapy.  We will continue rosuvastatin at this time.    3.  Patient's edema appears manageable and only minimal to mild on exam.  For now, no changes.    4.  I feel much of her symptoms of pain in the lower extremities is related to neuropathy.  We can monitor for now and see her back for follow-up in 6 months to a year.  She has labs monitored by primary.  Follow-up with primary as scheduled.         Karyna Stewart  reports that she quit smoking about 23 years ago. Her smoking use included cigarettes. She has never used smokeless tobacco.     Patient did not bring med list or medicine bottles to appointment, med list has been reviewed and updated based on patient's knowledge of their meds.      Advance Care Planning  ACP discussion was declined by the patient. Patient does not have an advance directive, declines further assistance.              Electronically signed by:

## 2025-08-14 DIAGNOSIS — R00.0 TACHYCARDIA: ICD-10-CM

## 2025-08-14 DIAGNOSIS — I10 ESSENTIAL HYPERTENSION: ICD-10-CM

## 2025-08-14 DIAGNOSIS — R00.2 PALPITATIONS: ICD-10-CM

## 2025-08-14 RX ORDER — ATENOLOL 25 MG/1
25 TABLET ORAL DAILY
Qty: 90 TABLET | Refills: 3 | Status: SHIPPED | OUTPATIENT
Start: 2025-08-14

## 2025-08-15 DIAGNOSIS — I10 ESSENTIAL HYPERTENSION: ICD-10-CM

## 2025-08-15 RX ORDER — AMLODIPINE BESYLATE 10 MG/1
10 TABLET ORAL DAILY
Qty: 90 TABLET | Refills: 3 | Status: SHIPPED | OUTPATIENT
Start: 2025-08-15

## (undated) DEVICE — CATH DIAG EXPO .045 FL3  5F 100CM

## (undated) DEVICE — DEV COMP RAD PRELUDESYNC 24CM

## (undated) DEVICE — GLIDESHEATH SLENDER STAINLESS STEEL KIT: Brand: GLIDESHEATH SLENDER

## (undated) DEVICE — SWAN-GANZ TRUE SIZE THERMODULTION CATHETER, 5F: Brand: SWAN-GANZ TRUE SIZE

## (undated) DEVICE — CATH DIAG EXPO M/ PK 5F FL4/FR4 PIG

## (undated) DEVICE — PINNACLE INTRODUCER SHEATH: Brand: PINNACLE

## (undated) DEVICE — GW INQWIRE FC PTFE STD J/1.5 .035 260